# Patient Record
Sex: MALE | Race: OTHER | NOT HISPANIC OR LATINO
[De-identification: names, ages, dates, MRNs, and addresses within clinical notes are randomized per-mention and may not be internally consistent; named-entity substitution may affect disease eponyms.]

---

## 2018-02-08 ENCOUNTER — RESULT REVIEW (OUTPATIENT)
Age: 68
End: 2018-02-08

## 2018-04-13 ENCOUNTER — APPOINTMENT (OUTPATIENT)
Dept: UROLOGY | Facility: CLINIC | Age: 68
End: 2018-04-13
Payer: MEDICARE

## 2018-04-13 VITALS
TEMPERATURE: 97.8 F | SYSTOLIC BLOOD PRESSURE: 143 MMHG | OXYGEN SATURATION: 98 % | WEIGHT: 240 LBS | HEART RATE: 65 BPM | DIASTOLIC BLOOD PRESSURE: 78 MMHG | BODY MASS INDEX: 33.6 KG/M2 | HEIGHT: 71 IN

## 2018-04-13 DIAGNOSIS — Z80.0 FAMILY HISTORY OF MALIGNANT NEOPLASM OF DIGESTIVE ORGANS: ICD-10-CM

## 2018-04-13 DIAGNOSIS — Z86.39 PERSONAL HISTORY OF OTHER ENDOCRINE, NUTRITIONAL AND METABOLIC DISEASE: ICD-10-CM

## 2018-04-13 DIAGNOSIS — R31.29 OTHER MICROSCOPIC HEMATURIA: ICD-10-CM

## 2018-04-13 DIAGNOSIS — Z87.891 PERSONAL HISTORY OF NICOTINE DEPENDENCE: ICD-10-CM

## 2018-04-13 DIAGNOSIS — N28.1 CYST OF KIDNEY, ACQUIRED: ICD-10-CM

## 2018-04-13 DIAGNOSIS — N20.0 CALCULUS OF KIDNEY: ICD-10-CM

## 2018-04-13 PROCEDURE — 51798 US URINE CAPACITY MEASURE: CPT | Mod: 59

## 2018-04-13 PROCEDURE — 51741 ELECTRO-UROFLOWMETRY FIRST: CPT

## 2018-04-13 PROCEDURE — 99204 OFFICE O/P NEW MOD 45 MIN: CPT

## 2018-04-15 ENCOUNTER — EMERGENCY (EMERGENCY)
Facility: HOSPITAL | Age: 68
LOS: 1 days | Discharge: HOME | End: 2018-04-15
Attending: EMERGENCY MEDICINE | Admitting: EMERGENCY MEDICINE
Payer: COMMERCIAL

## 2018-04-15 VITALS
TEMPERATURE: 99 F | DIASTOLIC BLOOD PRESSURE: 77 MMHG | HEART RATE: 73 BPM | OXYGEN SATURATION: 97 % | SYSTOLIC BLOOD PRESSURE: 157 MMHG | RESPIRATION RATE: 16 BRPM

## 2018-04-15 VITALS
WEIGHT: 240.08 LBS | RESPIRATION RATE: 18 BRPM | TEMPERATURE: 98 F | OXYGEN SATURATION: 96 % | SYSTOLIC BLOOD PRESSURE: 174 MMHG | DIASTOLIC BLOOD PRESSURE: 78 MMHG | HEART RATE: 64 BPM

## 2018-04-15 LAB
ALBUMIN SERPL ELPH-MCNC: 4.2 G/DL — SIGNIFICANT CHANGE UP (ref 3.3–5)
ALP SERPL-CCNC: 51 U/L — SIGNIFICANT CHANGE UP (ref 40–120)
ALT FLD-CCNC: 30 U/L RC — SIGNIFICANT CHANGE UP (ref 10–45)
ANION GAP SERPL CALC-SCNC: 15 MMOL/L — SIGNIFICANT CHANGE UP (ref 5–17)
APPEARANCE UR: CLEAR — SIGNIFICANT CHANGE UP
AST SERPL-CCNC: 23 U/L — SIGNIFICANT CHANGE UP (ref 10–40)
BASOPHILS # BLD AUTO: 0 K/UL — SIGNIFICANT CHANGE UP (ref 0–0.2)
BASOPHILS NFR BLD AUTO: 0 % — SIGNIFICANT CHANGE UP (ref 0–2)
BILIRUB SERPL-MCNC: 0.3 MG/DL — SIGNIFICANT CHANGE UP (ref 0.2–1.2)
BILIRUB UR-MCNC: NEGATIVE — SIGNIFICANT CHANGE UP
BUN SERPL-MCNC: 28 MG/DL — HIGH (ref 7–23)
CALCIUM SERPL-MCNC: 10 MG/DL — SIGNIFICANT CHANGE UP (ref 8.4–10.5)
CHLORIDE SERPL-SCNC: 99 MMOL/L — SIGNIFICANT CHANGE UP (ref 96–108)
CO2 SERPL-SCNC: 23 MMOL/L — SIGNIFICANT CHANGE UP (ref 22–31)
COLOR SPEC: YELLOW — SIGNIFICANT CHANGE UP
CREAT SERPL-MCNC: 2.4 MG/DL — HIGH (ref 0.5–1.3)
DIFF PNL FLD: ABNORMAL
EOSINOPHIL # BLD AUTO: 0 K/UL — SIGNIFICANT CHANGE UP (ref 0–0.5)
EOSINOPHIL NFR BLD AUTO: 0.1 % — SIGNIFICANT CHANGE UP (ref 0–6)
GLUCOSE SERPL-MCNC: 430 MG/DL — HIGH (ref 70–99)
GLUCOSE UR QL: >1000 MG/DL
HCT VFR BLD CALC: 45.4 % — SIGNIFICANT CHANGE UP (ref 39–50)
HGB BLD-MCNC: 15.3 G/DL — SIGNIFICANT CHANGE UP (ref 13–17)
KETONES UR-MCNC: NEGATIVE — SIGNIFICANT CHANGE UP
LEUKOCYTE ESTERASE UR-ACNC: NEGATIVE — SIGNIFICANT CHANGE UP
LYMPHOCYTES # BLD AUTO: 0.8 K/UL — LOW (ref 1–3.3)
LYMPHOCYTES # BLD AUTO: 9.2 % — LOW (ref 13–44)
MCHC RBC-ENTMCNC: 31 PG — SIGNIFICANT CHANGE UP (ref 27–34)
MCHC RBC-ENTMCNC: 33.7 GM/DL — SIGNIFICANT CHANGE UP (ref 32–36)
MCV RBC AUTO: 92 FL — SIGNIFICANT CHANGE UP (ref 80–100)
MONOCYTES # BLD AUTO: 0.3 K/UL — SIGNIFICANT CHANGE UP (ref 0–0.9)
MONOCYTES NFR BLD AUTO: 2.9 % — SIGNIFICANT CHANGE UP (ref 2–14)
NEUTROPHILS # BLD AUTO: 7.8 K/UL — HIGH (ref 1.8–7.4)
NEUTROPHILS NFR BLD AUTO: 87.8 % — HIGH (ref 43–77)
NITRITE UR-MCNC: NEGATIVE — SIGNIFICANT CHANGE UP
PH UR: 5.5 — SIGNIFICANT CHANGE UP (ref 5–8)
PLATELET # BLD AUTO: 212 K/UL — SIGNIFICANT CHANGE UP (ref 150–400)
POTASSIUM SERPL-MCNC: 5.1 MMOL/L — SIGNIFICANT CHANGE UP (ref 3.5–5.3)
POTASSIUM SERPL-SCNC: 5.1 MMOL/L — SIGNIFICANT CHANGE UP (ref 3.5–5.3)
PROT SERPL-MCNC: 7.1 G/DL — SIGNIFICANT CHANGE UP (ref 6–8.3)
PROT UR-MCNC: SIGNIFICANT CHANGE UP
RBC # BLD: 4.94 M/UL — SIGNIFICANT CHANGE UP (ref 4.2–5.8)
RBC # FLD: 13.1 % — SIGNIFICANT CHANGE UP (ref 10.3–14.5)
SODIUM SERPL-SCNC: 137 MMOL/L — SIGNIFICANT CHANGE UP (ref 135–145)
SP GR SPEC: 1.03 — HIGH (ref 1.01–1.02)
UROBILINOGEN FLD QL: NEGATIVE — SIGNIFICANT CHANGE UP
WBC # BLD: 8.9 K/UL — SIGNIFICANT CHANGE UP (ref 3.8–10.5)
WBC # FLD AUTO: 8.9 K/UL — SIGNIFICANT CHANGE UP (ref 3.8–10.5)

## 2018-04-15 PROCEDURE — 99284 EMERGENCY DEPT VISIT MOD MDM: CPT | Mod: 25

## 2018-04-15 PROCEDURE — 74176 CT ABD & PELVIS W/O CONTRAST: CPT

## 2018-04-15 PROCEDURE — 74176 CT ABD & PELVIS W/O CONTRAST: CPT | Mod: 26

## 2018-04-15 PROCEDURE — 96374 THER/PROPH/DIAG INJ IV PUSH: CPT

## 2018-04-15 PROCEDURE — 85027 COMPLETE CBC AUTOMATED: CPT

## 2018-04-15 PROCEDURE — 81001 URINALYSIS AUTO W/SCOPE: CPT

## 2018-04-15 PROCEDURE — 82962 GLUCOSE BLOOD TEST: CPT

## 2018-04-15 PROCEDURE — 80053 COMPREHEN METABOLIC PANEL: CPT

## 2018-04-15 PROCEDURE — 96372 THER/PROPH/DIAG INJ SC/IM: CPT | Mod: XU

## 2018-04-15 RX ORDER — INSULIN LISPRO 100/ML
8 VIAL (ML) SUBCUTANEOUS ONCE
Qty: 0 | Refills: 0 | Status: COMPLETED | OUTPATIENT
Start: 2018-04-15 | End: 2018-04-15

## 2018-04-15 RX ORDER — SODIUM CHLORIDE 9 MG/ML
1000 INJECTION, SOLUTION INTRAVENOUS ONCE
Qty: 0 | Refills: 0 | Status: COMPLETED | OUTPATIENT
Start: 2018-04-15 | End: 2018-04-15

## 2018-04-15 RX ORDER — KETOROLAC TROMETHAMINE 30 MG/ML
15 SYRINGE (ML) INJECTION ONCE
Qty: 0 | Refills: 0 | Status: DISCONTINUED | OUTPATIENT
Start: 2018-04-15 | End: 2018-04-15

## 2018-04-15 RX ORDER — INSULIN LISPRO 100/ML
5 VIAL (ML) SUBCUTANEOUS ONCE
Qty: 0 | Refills: 0 | Status: DISCONTINUED | OUTPATIENT
Start: 2018-04-15 | End: 2018-04-15

## 2018-04-15 RX ADMIN — Medication 8 UNIT(S): at 11:08

## 2018-04-15 RX ADMIN — Medication 15 MILLIGRAM(S): at 08:40

## 2018-04-15 RX ADMIN — SODIUM CHLORIDE 1000 MILLILITER(S): 9 INJECTION, SOLUTION INTRAVENOUS at 08:21

## 2018-04-15 RX ADMIN — Medication 15 MILLIGRAM(S): at 08:13

## 2018-04-15 NOTE — ED ADULT NURSE NOTE - OBJECTIVE STATEMENT
68 y/o male c/o left flank pain started since last night.  Pt states that the pain is on his left side,  and he's unable find a comfortable position.  Pt also c/o some nausea. Pt denies fever, vomiting,  hematuria, dysuria, weakness, numbness, tingling, chest pain, SOB.  No acute respiratory distress noted.   Abd soft, distented, large hernia.

## 2018-04-15 NOTE — ED PROVIDER NOTE - PROGRESS NOTE DETAILS
Dr. Chavis Note: pt stable, blood usgar improved, spoke to radiology attending about ct report, 1mm stone with mild hydronephrosis, technical issues with getting report, stable for dc home with urologist f/u otherwise.

## 2018-04-15 NOTE — ED PROVIDER NOTE - MEDICAL DECISION MAKING DETAILS
67 Y M with L flank pain and recent US with stone. DDx: Nephrolithiasis, pyelonephritis, strain/sprain. Will get CT abdomen since this is first stone and distant hx of smoking to look for any vascular abnormalities as well. Will give Toradol now, basic labs and UA, if no signs of infection and able to control pain will DC with urology follow up.

## 2018-04-16 LAB
APPEARANCE: CLEAR
BACTERIA UR CULT: NORMAL
BACTERIA: NEGATIVE
BILIRUBIN URINE: NEGATIVE
BLOOD URINE: ABNORMAL
COLOR: YELLOW
GLUCOSE QUALITATIVE U: NEGATIVE MG/DL
KETONES URINE: NEGATIVE
LEUKOCYTE ESTERASE URINE: NEGATIVE
MICROSCOPIC-UA: NORMAL
NITRITE URINE: NEGATIVE
PH URINE: 5
PROTEIN URINE: 30 MG/DL
RED BLOOD CELLS URINE: 3 /HPF
SPECIFIC GRAVITY URINE: 1.02
SQUAMOUS EPITHELIAL CELLS: 1 /HPF
UROBILINOGEN URINE: NEGATIVE MG/DL
WHITE BLOOD CELLS URINE: 1 /HPF

## 2018-04-18 LAB — CORE LAB FLUID CYTOLOGY: NORMAL

## 2018-08-10 ENCOUNTER — APPOINTMENT (OUTPATIENT)
Dept: UROLOGY | Facility: CLINIC | Age: 68
End: 2018-08-10

## 2019-05-20 PROBLEM — E11.9 TYPE 2 DIABETES MELLITUS WITHOUT COMPLICATIONS: Chronic | Status: ACTIVE | Noted: 2018-04-15

## 2019-05-22 ENCOUNTER — APPOINTMENT (OUTPATIENT)
Dept: ORTHOPEDIC SURGERY | Facility: CLINIC | Age: 69
End: 2019-05-22
Payer: MEDICARE

## 2019-05-22 PROCEDURE — 99204 OFFICE O/P NEW MOD 45 MIN: CPT

## 2019-05-22 PROCEDURE — 73564 X-RAY EXAM KNEE 4 OR MORE: CPT | Mod: RT

## 2019-05-22 NOTE — PHYSICAL EXAM
[de-identified] : Patient is well nourished, well-developed, in no acute distress, with appropriate mood and affect. The patient is oriented to time, place, and person. Respirations are even and unlabored. Gait evaluation does not reveal a limp. There is no inguinal adenopathy. Examination of the contralateral knee shows normal range of motion, strength, no tenderness, and intact skin. The affected limb is well-perfused and showed 2+ dp/pt pulses, without skin lesions, shows a grossly normal motor and sensory examination. Knee motion is painless and the knee moves from 0-135 degrees. The knee is stable within that range-of-motion to AP and ML stress with a 1A Lachman, negative anterior or posterior drawer and no instability to varus or valgus stress. The alignment of the knee is 5 degrees of varus. No effusion or crepitus is noted.  Tender to palpation in the medial joint line.  No tenderness to palpation about the lateral joint line, medial or lateral tibial plateau, medial or lateral femoral condyle, medial or lateral patellar facets, superior or inferior pole of the patella. Gerber's is positive medially. Muscle strength is normal. Pedal pulses are palpable. Hip examination was negative. [de-identified] : Long standing knee, AP knee, lateral knee, and patellar views of the right knee were ordered and taken in the office and demonstrate no evidence of degenerative joint disease of the knee, fractures, intra-articular pathology.

## 2019-05-22 NOTE — HISTORY OF PRESENT ILLNESS
[de-identified] : This is very nice 68-year-old gentleman experiencing 1 month of right knee pain, which is moderate in intensity. The pain mildly limits activities of daily living. Walking tolerance is not reduced.  He says he has some intermittent clicking in the knee but no catching or locking or giving way.  He takes NSAIDs over-the-counter for this which helps mildly.  The knee brace does help.  Rest does help.  The patient denies any radiation of the pain to the feet and it is not associated with numbness, tingling, or weakness.  He does not need a cane or walker for ambulation.  He has not tried formal physical therapy.

## 2019-05-22 NOTE — DISCUSSION/SUMMARY
[de-identified] : This patient has right medial knee pain with some positive meniscal signs for injury.  An extensive discussion was conducted on the natural history of the disease and the variety of surgical and non-surgical options available to the patient including, but not limited to non-steroidal anti-inflammatory medications, steroid injections, physical therapy, maintenance of ideal body weight, and reduction of activity.  I recommended a home exercise program and use of a neoprene sleeve knee brace.  He can continue to take over-the-counter NSAIDs as needed for pain.  The patient will schedule an appointment as needed.

## 2019-07-09 ENCOUNTER — APPOINTMENT (OUTPATIENT)
Dept: ORTHOPEDIC SURGERY | Facility: CLINIC | Age: 69
End: 2019-07-09
Payer: MEDICARE

## 2019-07-09 VITALS — BODY MASS INDEX: 31.52 KG/M2 | WEIGHT: 226 LBS

## 2019-07-09 DIAGNOSIS — M25.561 PAIN IN RIGHT KNEE: ICD-10-CM

## 2019-07-09 PROCEDURE — 99213 OFFICE O/P EST LOW 20 MIN: CPT

## 2019-07-09 NOTE — PHYSICAL EXAM
[de-identified] : Patient is well nourished, well-developed, in no acute distress, with appropriate mood and affect. The patient is oriented to time, place, and person. Respirations are even and unlabored. Gait evaluation does not reveal a limp. There is no inguinal adenopathy. Examination of the contralateral knee shows normal range of motion, strength, no tenderness, and intact skin. The affected limb is well-perfused and showed 2+ dp/pt pulses, without skin lesions, shows a grossly normal motor and sensory examination. Knee motion is painless and the knee moves from 0-135 degrees. The knee is stable within that range-of-motion to AP and ML stress with a 1A Lachman, negative anterior or posterior drawer and no instability to varus or valgus stress. The alignment of the knee is 5 degrees of varus. No effusion or crepitus is noted.  Tender to palpation in the medial joint line.  No tenderness to palpation about the lateral joint line, medial or lateral tibial plateau, medial or lateral femoral condyle, medial or lateral patellar facets, superior or inferior pole of the patella. Gerber's is positive medially. Muscle strength is normal. Pedal pulses are palpable. Hip examination was negative.

## 2019-07-09 NOTE — HISTORY OF PRESENT ILLNESS
[de-identified] : This is very nice 68-year-old gentleman experiencing 3 month of right knee pain, which is moderate in intensity. The pain mildly limits activities of daily living. Walking tolerance is not reduced.  He says he has some intermittent clicking in the knee but no catching or locking or giving way.  He takes NSAIDs over-the-counter for this which helps mildly.  He never tried the prescription medications.  The knee brace does help.  Rest does help.  The patient denies any radiation of the pain to the feet and it is not associated with numbness, tingling, or weakness.  He does not need a cane or walker for ambulation.  He has not tried formal physical therapy.

## 2019-07-09 NOTE — DISCUSSION/SUMMARY
[de-identified] : This patient has right medial knee pain with some positive meniscal signs for injury.  An extensive discussion was conducted on the natural history of the disease and the variety of surgical and non-surgical options available to the patient including, but not limited to non-steroidal anti-inflammatory medications, steroid injections, physical therapy, maintenance of ideal body weight, and reduction of activity.  I recommended a home exercise program and use of a neoprene sleeve knee brace.  Now he is agreeable to Mobic and so I prescribed this time..  The patient will schedule an appointment as needed.  If his pain continues after 6 weeks and he will call my office and we will obtain an MRI of the right knee.

## 2022-11-21 ENCOUNTER — NON-APPOINTMENT (OUTPATIENT)
Age: 72
End: 2022-11-21

## 2022-11-30 ENCOUNTER — INPATIENT (INPATIENT)
Facility: HOSPITAL | Age: 72
LOS: 9 days | Discharge: HOME | DRG: 280 | End: 2022-12-10
Attending: STUDENT IN AN ORGANIZED HEALTH CARE EDUCATION/TRAINING PROGRAM | Admitting: STUDENT IN AN ORGANIZED HEALTH CARE EDUCATION/TRAINING PROGRAM
Payer: COMMERCIAL

## 2022-11-30 VITALS
TEMPERATURE: 98 F | RESPIRATION RATE: 18 BRPM | OXYGEN SATURATION: 95 % | SYSTOLIC BLOOD PRESSURE: 146 MMHG | HEIGHT: 71 IN | WEIGHT: 244.93 LBS | DIASTOLIC BLOOD PRESSURE: 101 MMHG | HEART RATE: 123 BPM

## 2022-11-30 LAB
ALBUMIN SERPL ELPH-MCNC: 4.5 G/DL — SIGNIFICANT CHANGE UP (ref 3.3–5)
ALP SERPL-CCNC: 49 U/L — SIGNIFICANT CHANGE UP (ref 40–120)
ALT FLD-CCNC: 51 U/L — HIGH (ref 10–45)
ANION GAP SERPL CALC-SCNC: 11 MMOL/L — SIGNIFICANT CHANGE UP (ref 5–17)
AST SERPL-CCNC: 75 U/L — HIGH (ref 10–40)
BASE EXCESS BLDV CALC-SCNC: 1.5 MMOL/L — SIGNIFICANT CHANGE UP (ref -2–3)
BASOPHILS # BLD AUTO: 0.05 K/UL — SIGNIFICANT CHANGE UP (ref 0–0.2)
BASOPHILS NFR BLD AUTO: 0.6 % — SIGNIFICANT CHANGE UP (ref 0–2)
BILIRUB SERPL-MCNC: 0.3 MG/DL — SIGNIFICANT CHANGE UP (ref 0.2–1.2)
BUN SERPL-MCNC: 31 MG/DL — HIGH (ref 7–23)
CA-I SERPL-SCNC: 1.22 MMOL/L — SIGNIFICANT CHANGE UP (ref 1.15–1.33)
CALCIUM SERPL-MCNC: 9.7 MG/DL — SIGNIFICANT CHANGE UP (ref 8.4–10.5)
CHLORIDE BLDV-SCNC: 108 MMOL/L — SIGNIFICANT CHANGE UP (ref 96–108)
CHLORIDE SERPL-SCNC: 107 MMOL/L — SIGNIFICANT CHANGE UP (ref 96–108)
CO2 BLDV-SCNC: 29 MMOL/L — HIGH (ref 22–26)
CO2 SERPL-SCNC: 23 MMOL/L — SIGNIFICANT CHANGE UP (ref 22–31)
CREAT SERPL-MCNC: 2.01 MG/DL — HIGH (ref 0.5–1.3)
EGFR: 35 ML/MIN/1.73M2 — LOW
EOSINOPHIL # BLD AUTO: 0.08 K/UL — SIGNIFICANT CHANGE UP (ref 0–0.5)
EOSINOPHIL NFR BLD AUTO: 1 % — SIGNIFICANT CHANGE UP (ref 0–6)
FLUAV AG NPH QL: SIGNIFICANT CHANGE UP
FLUBV AG NPH QL: SIGNIFICANT CHANGE UP
GAS PNL BLDV: 138 MMOL/L — SIGNIFICANT CHANGE UP (ref 136–145)
GAS PNL BLDV: SIGNIFICANT CHANGE UP
GLUCOSE BLDV-MCNC: 180 MG/DL — HIGH (ref 70–99)
GLUCOSE SERPL-MCNC: 174 MG/DL — HIGH (ref 70–99)
HCO3 BLDV-SCNC: 28 MMOL/L — SIGNIFICANT CHANGE UP (ref 22–29)
HCT VFR BLD CALC: 41 % — SIGNIFICANT CHANGE UP (ref 39–50)
HCT VFR BLDA CALC: 39 % — SIGNIFICANT CHANGE UP (ref 39–51)
HGB BLD CALC-MCNC: 12.9 G/DL — SIGNIFICANT CHANGE UP (ref 12.6–17.4)
HGB BLD-MCNC: 12.7 G/DL — LOW (ref 13–17)
IMM GRANULOCYTES NFR BLD AUTO: 0.4 % — SIGNIFICANT CHANGE UP (ref 0–0.9)
LACTATE BLDV-MCNC: 1 MMOL/L — SIGNIFICANT CHANGE UP (ref 0.5–2)
LYMPHOCYTES # BLD AUTO: 1.95 K/UL — SIGNIFICANT CHANGE UP (ref 1–3.3)
LYMPHOCYTES # BLD AUTO: 24 % — SIGNIFICANT CHANGE UP (ref 13–44)
MCHC RBC-ENTMCNC: 29.1 PG — SIGNIFICANT CHANGE UP (ref 27–34)
MCHC RBC-ENTMCNC: 31 GM/DL — LOW (ref 32–36)
MCV RBC AUTO: 94 FL — SIGNIFICANT CHANGE UP (ref 80–100)
MONOCYTES # BLD AUTO: 0.77 K/UL — SIGNIFICANT CHANGE UP (ref 0–0.9)
MONOCYTES NFR BLD AUTO: 9.5 % — SIGNIFICANT CHANGE UP (ref 2–14)
NEUTROPHILS # BLD AUTO: 5.24 K/UL — SIGNIFICANT CHANGE UP (ref 1.8–7.4)
NEUTROPHILS NFR BLD AUTO: 64.5 % — SIGNIFICANT CHANGE UP (ref 43–77)
NRBC # BLD: 0 /100 WBCS — SIGNIFICANT CHANGE UP (ref 0–0)
PCO2 BLDV: 49 MMHG — SIGNIFICANT CHANGE UP (ref 42–55)
PH BLDV: 7.36 — SIGNIFICANT CHANGE UP (ref 7.32–7.43)
PLATELET # BLD AUTO: 271 K/UL — SIGNIFICANT CHANGE UP (ref 150–400)
PO2 BLDV: 36 MMHG — SIGNIFICANT CHANGE UP (ref 25–45)
POTASSIUM BLDV-SCNC: 4.7 MMOL/L — SIGNIFICANT CHANGE UP (ref 3.5–5.1)
POTASSIUM SERPL-MCNC: 4.5 MMOL/L — SIGNIFICANT CHANGE UP (ref 3.5–5.3)
POTASSIUM SERPL-SCNC: 4.5 MMOL/L — SIGNIFICANT CHANGE UP (ref 3.5–5.3)
PROT SERPL-MCNC: 7.5 G/DL — SIGNIFICANT CHANGE UP (ref 6–8.3)
RBC # BLD: 4.36 M/UL — SIGNIFICANT CHANGE UP (ref 4.2–5.8)
RBC # FLD: 14.2 % — SIGNIFICANT CHANGE UP (ref 10.3–14.5)
RSV RNA NPH QL NAA+NON-PROBE: SIGNIFICANT CHANGE UP
SAO2 % BLDV: 51.6 % — LOW (ref 67–88)
SARS-COV-2 RNA SPEC QL NAA+PROBE: SIGNIFICANT CHANGE UP
SODIUM SERPL-SCNC: 141 MMOL/L — SIGNIFICANT CHANGE UP (ref 135–145)
WBC # BLD: 8.12 K/UL — SIGNIFICANT CHANGE UP (ref 3.8–10.5)
WBC # FLD AUTO: 8.12 K/UL — SIGNIFICANT CHANGE UP (ref 3.8–10.5)

## 2022-11-30 PROCEDURE — 70450 CT HEAD/BRAIN W/O DYE: CPT | Mod: 26,MA

## 2022-11-30 PROCEDURE — 73564 X-RAY EXAM KNEE 4 OR MORE: CPT | Mod: 26,RT

## 2022-11-30 PROCEDURE — 99285 EMERGENCY DEPT VISIT HI MDM: CPT

## 2022-11-30 PROCEDURE — 71046 X-RAY EXAM CHEST 2 VIEWS: CPT | Mod: 26

## 2022-11-30 PROCEDURE — 93010 ELECTROCARDIOGRAM REPORT: CPT

## 2022-11-30 RX ORDER — SODIUM CHLORIDE 9 MG/ML
500 INJECTION INTRAMUSCULAR; INTRAVENOUS; SUBCUTANEOUS ONCE
Refills: 0 | Status: COMPLETED | OUTPATIENT
Start: 2022-11-30 | End: 2022-11-30

## 2022-11-30 RX ORDER — HALOPERIDOL DECANOATE 100 MG/ML
5 INJECTION INTRAMUSCULAR ONCE
Refills: 0 | Status: DISCONTINUED | OUTPATIENT
Start: 2022-11-30 | End: 2022-11-30

## 2022-11-30 NOTE — ED PROVIDER NOTE - OBJECTIVE STATEMENT
71yo M T2DM, HTN/HLD presents emergency department for seizure-like behavior over the past 2 to 3 days.  Patient accompanied by wife.  Reports while [place] 2 to 3 days ago had episode of kicking of his legs witnessed by son while patient was sleeping.  Patient woke up when EMS stimulated him at that time found hypoglycemic to the 40s and given dextrose.  No clear postictal period.  Had another episode of erratic behavior 2 to 3 weeks ago where he was running around the house screaming obscenities at that time EMS found patient hypoglycemic which resolved with p.o. sugar.  Over the past month patient has had worsening bilateral lower extremity edema as well as increased exercise fatigue and shortness of breath with orthopnea. 73yo M T2DM, HTN/HLD presents emergency department for seizure-like behavior over the past 2 to 3 days.  Patient accompanied by wife.  Reports while [place] 2 to 3 days ago had episode of kicking of his legs witnessed by son while patient was sleeping.  Patient woke up when EMS stimulated him at that time found hypoglycemic to the 40s and given dextrose.  No clear postictal period.  Had another episode of erratic behavior 2 to 3 weeks ago where he was running around the house screaming obscenities at that time EMS found patient hypoglycemic which resolved with p.o. sugar.  Over the past month patient has had worsening bilateral lower extremity edema as well as increased exercise fatigue and shortness of breath with orthopnea.    Home Meds: metformin 500mg BID, Toprol xl 100mg qd, Amlodipine-benazipril 10/20mg qd, ASA 81mg qd, lipitor 80mg qd

## 2022-11-30 NOTE — ED ADULT TRIAGE NOTE - CHIEF COMPLAINT QUOTE
seizure while sleeping last night as per son while in [place] was seen by EMS there and flew in today   c/o pain to neck & R knee

## 2022-11-30 NOTE — ED PROVIDER NOTE - RAPID ASSESSMENT
72y M w/ PMHx of IDDM, HTN, HLD presents to the ED c/o seizure while sleeping x2-3days and Orthopnea witnessed by son. Pt states last night in [place], pt fell off bed, hit R knee. Pt did not have previous seizures prior to onset of symptoms. Pt unsure of how long seizure was. Pt endorses syncopal event earlier this month on 11/13 which pt received medical care for. Pt went to PCP Dr. Jim for follow up w/ CXR and EKG which was normal 2 wks ago. Pt is scheduled for an echo in a month. Pt is well appearing in triage. Pt is awake, alert and in no distress.    Maricruz THAYER (Scribe) have documented this rapid assessment note under the dictation of Vijay Mcgraw) which has been reviewed and affirmed to be accurate. Patient was seen as a QDOC patient. 72y M w/ PMHx of IDDM, HTN, HLD presents to the ED c/o seizure while sleeping x2-3days and Orthopnea witnessed by son. Pt states last night in [place], pt fell off bed, hit R knee. Pt did not have previous seizures prior to onset of symptoms. Pt unsure of how long seizure was. Pt endorses syncopal event earlier this month on 11/13 which pt received medical care for. Pt went to PCP Dr. Jim for follow up w/ CXR and EKG which was normal 2 wks ago. Pt is scheduled for an echo in a month. Pt is well appearing in triage. Pt is awake, alert and in no distress.  Viajy Mcgraw MD, Facep     Maricruz THAYER (Scribe) have documented this rapid assessment note under the dictation of Vijay Mcgraw (MD) which has been reviewed and affirmed to be accurate. Patient was seen as a QDOC patient.    Pt seen as Q-doc/triage with scribe, Full H&P/evaulation to be performed once pt is transferred to main ED  Vijay Mcgraw MD, Face

## 2022-11-30 NOTE — ED PROVIDER NOTE - CLINICAL SUMMARY MEDICAL DECISION MAKING FREE TEXT BOX
Lori Chamorro MD - Attending Physician: Pt here with altered episodes ?associated hypoglycemia. Syncopal episode but no actual seizure episodes. Associated new orthopnea and concern for new CHF. Tachy on arrival, exam otherwise nonfocal. CT, labs, Xr for eval

## 2022-11-30 NOTE — ED ADULT NURSE NOTE - CHIEF COMPLAINT QUOTE
seizure while sleeping last night as per son while in [place] was seen by EMS there and flew in today   c/o pain to neck & R knee

## 2022-11-30 NOTE — ED ADULT NURSE NOTE - OBJECTIVE STATEMENT
Pt 73 y/o male, AxOX3, presents to ED from home complaining of multiple seizures. Pt reporting that family found pt on floor in the hotel room by son, BRYAN, kicking the sheet rock of wall. Pt does not recall event. Reporting that EMS in [facility] [place] found pt BS to be 40 at that time. Wife at bedside stating that 2 weeks ago, pt was screaming running around house yelling " I am a good man". At that time BS also found to be in the 40's. Pt does not recall that episode.  Pt is well appearing, speaking full sentences without difficulty. Breathing spontaneous and unlabored with pulse ox >95% on room air. Upon assessment, swelling noted to b/l LE +1 edema, abdomen soft and nontender, +strong peripheral pulses, moving all extremities without difficulty, lungs clear. Safety and comfort measures initiated- bed placed in lowest position and side rails raised. Pt oriented to call bell system. Pt 73 y/o male, AxOX3, presents to ED from home complaining of multiple seizures. Pt reporting that family found pt on floor in the hotel room by son, BRYAN, kicking the sheet rock of wall. Pt does not recall event, was back to baseline in 15 min. Reporting that EMS in [facility] [place] found pt blood sugar to be 40 at that time. Wife at bedside stating that 2 weeks ago, pt was screaming running around house yelling " I am a good man". At that time Blood Sugar also found to be in the 40's. Pt does not recall that episode. Pt only complaint is GUILELN w/ walking x 1 month. Denies complaints at this time. Pt is well appearing, speaking full sentences without difficulty. Breathing spontaneous and unlabored with pulse ox >95% on room air. Upon assessment, swelling noted to b/l LE +1 edema, abdomen soft and nontender, +strong peripheral pulses, moving all extremities without difficulty, lungs clear. Pt placed on continuous pulse ox and cardiac monitor, sinus tachycardia noted. Safety and comfort measures initiated- bed placed in lowest position and side rails raised. Pt oriented to call bell system.

## 2022-11-30 NOTE — ED PROVIDER NOTE - PHYSICAL EXAMINATION
GENERAL: non-toxic appearing, alert, in NAD  HEENT: atraumatic, normocephalic, Vision grossly intact, no conjunctivitis or discharge, hearing grossly intact,  no nasal discharge, epistaxis   CARDIAC: RRR, normal S1S2,  no appreciable murmurs, no cyanosis, cap refill < 2 seconds  PULM: normal work of breathing, oxygen saturation on RA wnl, CTAB, no crackles, rales, rhonchi, or wheezing  GI: abdomen nondistended, soft, nontender, no guarding or rebound tenderness, no palpable masses  NEURO: awake and alert, follows commands, normal speech, PERRLA, EOMI, no focal motor or sensory deficits, CN 2-12 intact  MSK: spine appears normal, no joint swelling or erythema, ranging all extremities with no appreciable loss of ROM  EXT: 2+ b/l LE edema,  no calf tenderness, redness or swelling  SKIN: warm, dry, and intact, no rashes  PSYCH: appropriate mood and affect

## 2022-11-30 NOTE — ED PROVIDER NOTE - PROGRESS NOTE DETAILS
aDryl Meyer, PGY-3: Pt reexamined at bedside, resting comfortably, vitals stable. Trop BNP and Cr elevated. CXR clear, likely intravascularly depleted as is sinus tach. Will give 500cc, likely new onset hf will admit for echo and further work up. Pending CT head read.

## 2022-12-01 DIAGNOSIS — Z29.9 ENCOUNTER FOR PROPHYLACTIC MEASURES, UNSPECIFIED: ICD-10-CM

## 2022-12-01 DIAGNOSIS — I10 ESSENTIAL (PRIMARY) HYPERTENSION: ICD-10-CM

## 2022-12-01 DIAGNOSIS — R06.02 SHORTNESS OF BREATH: ICD-10-CM

## 2022-12-01 DIAGNOSIS — E78.5 HYPERLIPIDEMIA, UNSPECIFIED: ICD-10-CM

## 2022-12-01 DIAGNOSIS — R56.9 UNSPECIFIED CONVULSIONS: ICD-10-CM

## 2022-12-01 DIAGNOSIS — R74.01 ELEVATION OF LEVELS OF LIVER TRANSAMINASE LEVELS: ICD-10-CM

## 2022-12-01 DIAGNOSIS — N17.9 ACUTE KIDNEY FAILURE, UNSPECIFIED: ICD-10-CM

## 2022-12-01 DIAGNOSIS — R77.8 OTHER SPECIFIED ABNORMALITIES OF PLASMA PROTEINS: ICD-10-CM

## 2022-12-01 LAB
ALBUMIN SERPL ELPH-MCNC: 3.5 G/DL — SIGNIFICANT CHANGE UP (ref 3.3–5)
ALP SERPL-CCNC: 39 U/L — LOW (ref 40–120)
ALT FLD-CCNC: 41 U/L — SIGNIFICANT CHANGE UP (ref 10–45)
ANION GAP SERPL CALC-SCNC: 9 MMOL/L — SIGNIFICANT CHANGE UP (ref 5–17)
APPEARANCE UR: CLEAR — SIGNIFICANT CHANGE UP
AST SERPL-CCNC: 53 U/L — HIGH (ref 10–40)
BACTERIA # UR AUTO: NEGATIVE — SIGNIFICANT CHANGE UP
BILIRUB SERPL-MCNC: 0.4 MG/DL — SIGNIFICANT CHANGE UP (ref 0.2–1.2)
BILIRUB UR-MCNC: NEGATIVE — SIGNIFICANT CHANGE UP
BUN SERPL-MCNC: 25 MG/DL — HIGH (ref 7–23)
CALCIUM SERPL-MCNC: 8.7 MG/DL — SIGNIFICANT CHANGE UP (ref 8.4–10.5)
CHLORIDE SERPL-SCNC: 107 MMOL/L — SIGNIFICANT CHANGE UP (ref 96–108)
CK MB BLD-MCNC: 0.2 % — SIGNIFICANT CHANGE UP (ref 0–3.5)
CK MB CFR SERPL CALC: 7.7 NG/ML — HIGH (ref 0–6.7)
CK SERPL-CCNC: 3256 U/L — HIGH (ref 30–200)
CO2 SERPL-SCNC: 23 MMOL/L — SIGNIFICANT CHANGE UP (ref 22–31)
COLOR SPEC: SIGNIFICANT CHANGE UP
CREAT SERPL-MCNC: 1.69 MG/DL — HIGH (ref 0.5–1.3)
DIFF PNL FLD: ABNORMAL
EGFR: 43 ML/MIN/1.73M2 — LOW
EPI CELLS # UR: 0 /HPF — SIGNIFICANT CHANGE UP
GLUCOSE BLDC GLUCOMTR-MCNC: 161 MG/DL — HIGH (ref 70–99)
GLUCOSE BLDC GLUCOMTR-MCNC: 179 MG/DL — HIGH (ref 70–99)
GLUCOSE BLDC GLUCOMTR-MCNC: 205 MG/DL — HIGH (ref 70–99)
GLUCOSE BLDC GLUCOMTR-MCNC: 228 MG/DL — HIGH (ref 70–99)
GLUCOSE SERPL-MCNC: 171 MG/DL — HIGH (ref 70–99)
GLUCOSE UR QL: NEGATIVE — SIGNIFICANT CHANGE UP
HCT VFR BLD CALC: 35.5 % — LOW (ref 39–50)
HGB BLD-MCNC: 11.1 G/DL — LOW (ref 13–17)
HYALINE CASTS # UR AUTO: 1 /LPF — SIGNIFICANT CHANGE UP (ref 0–2)
KETONES UR-MCNC: NEGATIVE — SIGNIFICANT CHANGE UP
LEUKOCYTE ESTERASE UR-ACNC: NEGATIVE — SIGNIFICANT CHANGE UP
MAGNESIUM SERPL-MCNC: 2 MG/DL — SIGNIFICANT CHANGE UP (ref 1.6–2.6)
MCHC RBC-ENTMCNC: 29.1 PG — SIGNIFICANT CHANGE UP (ref 27–34)
MCHC RBC-ENTMCNC: 31.3 GM/DL — LOW (ref 32–36)
MCV RBC AUTO: 93.2 FL — SIGNIFICANT CHANGE UP (ref 80–100)
NITRITE UR-MCNC: NEGATIVE — SIGNIFICANT CHANGE UP
NRBC # BLD: 0 /100 WBCS — SIGNIFICANT CHANGE UP (ref 0–0)
PH UR: 6 — SIGNIFICANT CHANGE UP (ref 5–8)
PHOSPHATE SERPL-MCNC: 3.3 MG/DL — SIGNIFICANT CHANGE UP (ref 2.5–4.5)
PLATELET # BLD AUTO: 232 K/UL — SIGNIFICANT CHANGE UP (ref 150–400)
POTASSIUM SERPL-MCNC: 4.6 MMOL/L — SIGNIFICANT CHANGE UP (ref 3.5–5.3)
POTASSIUM SERPL-SCNC: 4.6 MMOL/L — SIGNIFICANT CHANGE UP (ref 3.5–5.3)
PROT SERPL-MCNC: 6 G/DL — SIGNIFICANT CHANGE UP (ref 6–8.3)
PROT UR-MCNC: 100 — SIGNIFICANT CHANGE UP
RBC # BLD: 3.81 M/UL — LOW (ref 4.2–5.8)
RBC # FLD: 14.1 % — SIGNIFICANT CHANGE UP (ref 10.3–14.5)
RBC CASTS # UR COMP ASSIST: 71 /HPF — HIGH (ref 0–4)
SODIUM SERPL-SCNC: 139 MMOL/L — SIGNIFICANT CHANGE UP (ref 135–145)
SP GR SPEC: 1.02 — SIGNIFICANT CHANGE UP (ref 1.01–1.02)
TROPONIN T, HIGH SENSITIVITY RESULT: 91 NG/L — HIGH (ref 0–51)
UROBILINOGEN FLD QL: NEGATIVE — SIGNIFICANT CHANGE UP
WBC # BLD: 5.16 K/UL — SIGNIFICANT CHANGE UP (ref 3.8–10.5)
WBC # FLD AUTO: 5.16 K/UL — SIGNIFICANT CHANGE UP (ref 3.8–10.5)
WBC UR QL: 1 /HPF — SIGNIFICANT CHANGE UP (ref 0–5)

## 2022-12-01 PROCEDURE — 93306 TTE W/DOPPLER COMPLETE: CPT | Mod: 26

## 2022-12-01 PROCEDURE — 99223 1ST HOSP IP/OBS HIGH 75: CPT

## 2022-12-01 RX ORDER — DEXTROSE 50 % IN WATER 50 %
12.5 SYRINGE (ML) INTRAVENOUS ONCE
Refills: 0 | Status: DISCONTINUED | OUTPATIENT
Start: 2022-12-01 | End: 2022-12-10

## 2022-12-01 RX ORDER — INSULIN LISPRO 100/ML
VIAL (ML) SUBCUTANEOUS
Refills: 0 | Status: DISCONTINUED | OUTPATIENT
Start: 2022-12-01 | End: 2022-12-10

## 2022-12-01 RX ORDER — ASPIRIN/CALCIUM CARB/MAGNESIUM 324 MG
81 TABLET ORAL DAILY
Refills: 0 | Status: DISCONTINUED | OUTPATIENT
Start: 2022-12-01 | End: 2022-12-10

## 2022-12-01 RX ORDER — AMLODIPINE BESYLATE 2.5 MG/1
10 TABLET ORAL DAILY
Refills: 0 | Status: DISCONTINUED | OUTPATIENT
Start: 2022-12-02 | End: 2022-12-04

## 2022-12-01 RX ORDER — DEXTROSE 50 % IN WATER 50 %
25 SYRINGE (ML) INTRAVENOUS ONCE
Refills: 0 | Status: DISCONTINUED | OUTPATIENT
Start: 2022-12-01 | End: 2022-12-10

## 2022-12-01 RX ORDER — AMLODIPINE BESYLATE 2.5 MG/1
10 TABLET ORAL DAILY
Refills: 0 | Status: DISCONTINUED | OUTPATIENT
Start: 2022-12-01 | End: 2022-12-01

## 2022-12-01 RX ORDER — METFORMIN HYDROCHLORIDE 850 MG/1
500 TABLET ORAL
Qty: 0 | Refills: 0 | DISCHARGE

## 2022-12-01 RX ORDER — INSULIN NPH HUM/REG INSULIN HM 70-30/ML
54 VIAL (ML) SUBCUTANEOUS
Qty: 0 | Refills: 0 | DISCHARGE

## 2022-12-01 RX ORDER — METOPROLOL TARTRATE 50 MG
100 TABLET ORAL ONCE
Refills: 0 | Status: COMPLETED | OUTPATIENT
Start: 2022-12-01 | End: 2022-12-01

## 2022-12-01 RX ORDER — INSULIN GLARGINE 100 [IU]/ML
70 INJECTION, SOLUTION SUBCUTANEOUS AT BEDTIME
Refills: 0 | Status: DISCONTINUED | OUTPATIENT
Start: 2022-12-01 | End: 2022-12-01

## 2022-12-01 RX ORDER — INSULIN LISPRO 100/ML
23 VIAL (ML) SUBCUTANEOUS
Refills: 0 | Status: DISCONTINUED | OUTPATIENT
Start: 2022-12-01 | End: 2022-12-01

## 2022-12-01 RX ORDER — INSULIN GLARGINE 100 [IU]/ML
17 INJECTION, SOLUTION SUBCUTANEOUS AT BEDTIME
Refills: 0 | Status: DISCONTINUED | OUTPATIENT
Start: 2022-12-01 | End: 2022-12-04

## 2022-12-01 RX ORDER — SODIUM CHLORIDE 9 MG/ML
1000 INJECTION, SOLUTION INTRAVENOUS
Refills: 0 | Status: DISCONTINUED | OUTPATIENT
Start: 2022-12-01 | End: 2022-12-10

## 2022-12-01 RX ORDER — GLUCAGON INJECTION, SOLUTION 0.5 MG/.1ML
1 INJECTION, SOLUTION SUBCUTANEOUS ONCE
Refills: 0 | Status: DISCONTINUED | OUTPATIENT
Start: 2022-12-01 | End: 2022-12-10

## 2022-12-01 RX ORDER — AMLODIPINE BESYLATE 2.5 MG/1
1 TABLET ORAL
Qty: 0 | Refills: 0 | DISCHARGE

## 2022-12-01 RX ORDER — MORPHINE SULFATE 50 MG/1
2 CAPSULE, EXTENDED RELEASE ORAL ONCE
Refills: 0 | Status: DISCONTINUED | OUTPATIENT
Start: 2022-12-01 | End: 2022-12-01

## 2022-12-01 RX ORDER — INSULIN DETEMIR 100/ML (3)
80 INSULIN PEN (ML) SUBCUTANEOUS
Qty: 0 | Refills: 0 | DISCHARGE

## 2022-12-01 RX ORDER — ATORVASTATIN CALCIUM 80 MG/1
80 TABLET, FILM COATED ORAL ONCE
Refills: 0 | Status: COMPLETED | OUTPATIENT
Start: 2022-12-01 | End: 2022-12-01

## 2022-12-01 RX ORDER — INSULIN LISPRO 100/ML
6 VIAL (ML) SUBCUTANEOUS
Refills: 0 | Status: DISCONTINUED | OUTPATIENT
Start: 2022-12-01 | End: 2022-12-04

## 2022-12-01 RX ORDER — HEPARIN SODIUM 5000 [USP'U]/ML
5000 INJECTION INTRAVENOUS; SUBCUTANEOUS EVERY 8 HOURS
Refills: 0 | Status: DISCONTINUED | OUTPATIENT
Start: 2022-12-01 | End: 2022-12-02

## 2022-12-01 RX ORDER — DEXTROSE 50 % IN WATER 50 %
15 SYRINGE (ML) INTRAVENOUS ONCE
Refills: 0 | Status: DISCONTINUED | OUTPATIENT
Start: 2022-12-01 | End: 2022-12-10

## 2022-12-01 RX ORDER — ATORVASTATIN CALCIUM 80 MG/1
80 TABLET, FILM COATED ORAL AT BEDTIME
Refills: 0 | Status: DISCONTINUED | OUTPATIENT
Start: 2022-12-01 | End: 2022-12-02

## 2022-12-01 RX ORDER — SODIUM CHLORIDE 9 MG/ML
1000 INJECTION, SOLUTION INTRAVENOUS
Refills: 0 | Status: DISCONTINUED | OUTPATIENT
Start: 2022-12-01 | End: 2022-12-03

## 2022-12-01 RX ORDER — AMLODIPINE BESYLATE 2.5 MG/1
10 TABLET ORAL ONCE
Refills: 0 | Status: COMPLETED | OUTPATIENT
Start: 2022-12-01 | End: 2022-12-01

## 2022-12-01 RX ORDER — METFORMIN HYDROCHLORIDE 850 MG/1
500 TABLET ORAL ONCE
Refills: 0 | Status: COMPLETED | OUTPATIENT
Start: 2022-12-01 | End: 2022-12-01

## 2022-12-01 RX ADMIN — AMLODIPINE BESYLATE 10 MILLIGRAM(S): 2.5 TABLET ORAL at 12:51

## 2022-12-01 RX ADMIN — INSULIN GLARGINE 17 UNIT(S): 100 INJECTION, SOLUTION SUBCUTANEOUS at 23:17

## 2022-12-01 RX ADMIN — ATORVASTATIN CALCIUM 80 MILLIGRAM(S): 80 TABLET, FILM COATED ORAL at 22:30

## 2022-12-01 RX ADMIN — ATORVASTATIN CALCIUM 80 MILLIGRAM(S): 80 TABLET, FILM COATED ORAL at 04:50

## 2022-12-01 RX ADMIN — Medication 81 MILLIGRAM(S): at 04:49

## 2022-12-01 RX ADMIN — HEPARIN SODIUM 5000 UNIT(S): 5000 INJECTION INTRAVENOUS; SUBCUTANEOUS at 22:30

## 2022-12-01 RX ADMIN — Medication 100 MILLIGRAM(S): at 04:49

## 2022-12-01 RX ADMIN — Medication 1: at 16:23

## 2022-12-01 RX ADMIN — METFORMIN HYDROCHLORIDE 500 MILLIGRAM(S): 850 TABLET ORAL at 04:50

## 2022-12-01 RX ADMIN — AMLODIPINE BESYLATE 10 MILLIGRAM(S): 2.5 TABLET ORAL at 04:50

## 2022-12-01 RX ADMIN — SODIUM CHLORIDE 500 MILLILITER(S): 9 INJECTION INTRAMUSCULAR; INTRAVENOUS; SUBCUTANEOUS at 00:04

## 2022-12-01 RX ADMIN — SODIUM CHLORIDE 100 MILLILITER(S): 9 INJECTION, SOLUTION INTRAVENOUS at 12:51

## 2022-12-01 NOTE — MEDICAL STUDENT ADULT H&P (EDUCATION) - NS MD HP STUD PE NEURO FT
A&Ox3, CNs II-XII grossly intact, normal tone, strength 5/5 in UE and LE bilaterally, sensation to light touch intact, patellar reflexes 1+

## 2022-12-01 NOTE — H&P ADULT - PROBLEM SELECTOR PLAN 6
-c/w amlodpine for BP control   -holding ACE-I i/s/o [place]  -holding metoprolol to not blunt physiologic tachycardia  -consider switching out amlodipine for other GDMT blood pressure control (ARB/ARNI)

## 2022-12-01 NOTE — H&P ADULT - ATTENDING COMMENTS
72M with T2DM on insulin, HTN, HLD here with possible seizures 2/2 hypoglycemia likely from misunderstanding of insulin regimen, complicated by rhabdomyolysis and with worsening exercise tolerance secondary to possible acute heart failure.     # ADHF  Mildly increasing pitting edema per patient w/ worsening exercise tolerance and paroxysmal nocturnal dyspnea suggestive of heart failure, with elevated BMP  - obtain echo  - strict I&Os  - daily standing weights  - hold diuretics for now, given c/f rhabdo    # Rhabdo  Mildly elevated CK i/s/o possible seizures 2/2 hypoglycemia  - Cr improved with 500cc in ED, c/w gentle hydration, frequent monitor of respiratory status  - trend CK til normalization    # TATI  Elevated Cr i/s/o rhabdo, need to find out baseline  - Avoid nephrotoxic agents  - Trend Cr daily    # T2DM  On basal/bolus 80BID/45qac per endocrinologist office. However, pt is confused about his exact regimen.  - weight base dose for now given he may not be taking his insulin correctly and he has been hypoglycemic at home  - monitor FS qac qhs   - basal/bolus 17/6  - a1c, lipid panel    Rest of plan as above. Discussed with HS. 72M with T2DM on insulin, HTN, HLD here with possible seizures 2/2 hypoglycemia likely from misunderstanding of insulin regimen, complicated by rhabdomyolysis and with worsening exercise tolerance secondary to possible acute heart failure.     CXR personally interpreted clear lungs  CTH personally interpreted no intracranial pathology c    # ADHF  Mildly increasing pitting edema per patient w/ worsening exercise tolerance and paroxysmal nocturnal dyspnea suggestive of heart failure, with elevated BMP  - obtain echo  - strict I&Os  - daily standing weights  - hold diuretics for now, given c/f rhabdo    # Rhabdo  Mildly elevated CK i/s/o possible seizures 2/2 hypoglycemia  - Cr improved with 500cc in ED, c/w gentle hydration, frequent monitor of respiratory status  - trend CK til normalization    # TATI  Elevated Cr i/s/o rhabdo, need to find out baseline  - Avoid nephrotoxic agents  - Trend Cr daily    # T2DM  On basal/bolus 80BID/45qac per endocrinologist office. However, pt is confused about his exact regimen.  - weight base dose for now given he may not be taking his insulin correctly and he has been hypoglycemic at home  - monitor FS qac qhs   - basal/bolus 17/6  - a1c, lipid panel    Rest of plan as above. Discussed with HS.

## 2022-12-01 NOTE — MEDICAL STUDENT ADULT H&P (EDUCATION) - NS MD HP STUD PMH FT
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

## 2022-12-01 NOTE — MEDICAL STUDENT ADULT H&P (EDUCATION) - NS MD HP STUD ROS NEURO FT
+numbness/tingling in both feet, no weakness, no dizziness, no tremors, no memory impairment, no ataxia, no vertigo

## 2022-12-01 NOTE — MEDICAL STUDENT ADULT H&P (EDUCATION) - NS MD HP STUD ROS CARDIO FT
+paroxysmal nocturnal dyspnea, +orthopnea, +syncopal episode, no chest pain or pressure, no palpitations

## 2022-12-01 NOTE — MEDICAL STUDENT ADULT H&P (EDUCATION) - NS MD HP STUD SOCIAL HX FT
Live/work: Lives in a house with his wife and 2 sons. 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  Diet/Exercise: eats "not great," mostly starches, not a high salt diet. He tries to maintain an active lifestyle, exercising frequently by riding his bike and/or going to the gym but has noticed within the past couple of weeks he becomes short of breath/easily fatigued with minimal exertion (1 flight of steps, approximately 50 feet, housework, gardening). 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  EtOH: admits to having about 2 drinks on occasion  Tobacco: 1 cigar daily starting 6 months ago but stopped 1 month ago  Drugs: denies taking any illicit drugs or other prescription drugs not prescribed to him

## 2022-12-01 NOTE — MEDICAL STUDENT ADULT H&P (EDUCATION) - NS MD HP STUD RESULTS LAB FT
CKMB Units: 7.7 ng/mL   CPK Mass Assay %: 0.2 % Creatine Kinase, Serum: 3256: TEST REPEATED. U/L   Phosphorus Level, Serum: 3.3 mg/dL Magnesium, Serum: 2.0:  Comprehensive Metabolic Panel Discharge (12.01.22 @ 06:35)   Sodium, Serum: 139 mmol/L   Potassium, Serum: 4.6 mmol/L   Chloride, Serum: 107 mmol/L   Carbon Dioxide, Serum: 23 mmol/L   Anion Gap, Serum: 9 mmol/L   Blood Urea Nitrogen, Serum: 25 mg/dL   Creatinine, Serum: 1.69 mg/dL   Glucose, Serum: 171 mg/dL   Calcium, Total Serum: 8.7 mg/dL   Protein Total, Serum: 6.0 g/dL   Albumin, Serum: 3.5 g/dL   Bilirubin Total, Serum: 0.4 mg/dL   Alkaline Phosphatase, Serum: 39 U/L   Aspartate Aminotransferase (AST/SGOT): 53 U/L   Alanine Aminotransferase (ALT/SGPT): 41 U/L Troponin T, High Sensitivity Result: 91: Specimen not hemolyzed   Serum Pro-Brain Natriuretic Peptide: 3355 pg/mL (11.30.22 @ 22:14)

## 2022-12-01 NOTE — H&P ADULT - PROBLEM SELECTOR PLAN 1
Orthopnea, elevated proBNP, pulmonary edema suggestive of ADHF  >f/u TTE  -on room air  -holding diuretics i/s/o TATI   -hold home metoprolol to allow for physiologic compensatory tachycardia   -holding ACE-I i/s/o TATI --> consider switching to ARB/ARNI this admission

## 2022-12-01 NOTE — MEDICAL STUDENT ADULT H&P (EDUCATION) - NS MD HP STUD HM IMMUNIZATION FT
Received all childhood vaccines, never had chicken pox. UTD on adult vaccinations. He received the flu shot 3 weeks ago and a Covid booster shot 2 weeks ago, experiencing mild/moderate shoulder discomfort afterwards.

## 2022-12-01 NOTE — H&P ADULT - NSHPSOCIALHISTORY_GEN_ALL_CORE
Patient lives with wife at home; normally performs ADLs with ease  Eats out 4-5 times a week; high carb and high salt diet  6 months prior patient smoked 1 cigar a day daily, recently quit 1 month ago; no other smoking history

## 2022-12-01 NOTE — MEDICAL STUDENT ADULT H&P (EDUCATION) - NS MD HP STUD PE EXTREMITIES FT
1+ pedal edema bilaterally, extremities warm, no clubbing, no cyanosis; dorsalis pedis and posterior tibial pulses present bilaterally

## 2022-12-01 NOTE — H&P ADULT - NSICDXFAMILYHX_GEN_ALL_CORE_FT
FAMILY HISTORY:  Mother  Still living? Unknown  FHx: myocardial infarction, Age at diagnosis: Age Unknown    Sibling  Still living? Unknown  FHx: myocardial infarction, Age at diagnosis: Age Unknown

## 2022-12-01 NOTE — MEDICAL STUDENT ADULT H&P (EDUCATION) - NS MD HP STUD ROS ENMT FT
no hearing loss, no epistaxis, no nasal discharge, no congestion, no sore throat, no difficulty swallowing

## 2022-12-01 NOTE — H&P ADULT - PROBLEM SELECTOR PLAN 3
Per endocrinologist, patient on 80 BID levemir and 45 TID novolog with meals  Unclear compliance per patient history; concern for over medication resulting in hypoglycemic episodes  -started with weight base dosing for insulin (0.3 mg/kg/day) (17 units at bedtime, 6 units premeal)  -low dose ISS  -adjust Insulin as needed based on sliding scale requirements

## 2022-12-01 NOTE — ED ADULT NURSE REASSESSMENT NOTE - NSIMPLEMENTINTERV_GEN_ALL_ED
Implemented All Universal Safety Interventions:  [place] to call system. Call bell, personal items and telephone within reach. Instruct patient to call for assistance. Room bathroom lighting operational. Non-slip footwear when patient is off stretcher. Physically safe environment: no spills, clutter or unnecessary equipment. Stretcher in lowest position, wheels locked, appropriate side rails in place.

## 2022-12-01 NOTE — H&P ADULT - PROBLEM SELECTOR PLAN 5
Troponin peaked at 96 with elevated CKMB  -no longer needed to trend   -likely 2/2 Type II NSTEMI (demand ischemia) i/s/o ADHF

## 2022-12-01 NOTE — MEDICAL STUDENT ADULT H&P (EDUCATION) - NS MD HP STUD PE CARDIO FT
tachycardic, regular rhythm, normal S1/S2, no murmurs, gallops or rubs; PMI palpated in mid-clavicular line 5th intercostal space; no JVD

## 2022-12-01 NOTE — MEDICAL STUDENT ADULT H&P (EDUCATION) - NS MD HP STUD HX OF PRESENT ILLNESS FT
Patient is a 72 year old obese male with PMHx of insulin-dependent T2DM, HTN and HLD who presents after an episode of "seizure while asleep" 3 days ago, witnessed by his son. He and his son were on vacation in [facility] 3 days ago when his son witnessed the patient to have sudden leg kicking in the middle of the night, moving of his arms and falling out of his bed onto the floor. EMS was called and they found his blood sugar to be 40. He denies any period of confusion, weakness, headache or speech impairment afterwards. Over the past several weeks he says his son has witnessed 2 other episodes of nighttime kicking but not "as bad" as the most recent one. About 3 weeks ago he states he had an episode of "erratic" behavior while at home for which EMS was called and found he was hypoglycemic, they administered sugar and he returned to his baseline.  He sees an Endocrinologist and is taking Novolog (54 units) and Levemir (80 units) (last HgA1c approx. 10%). Notably, however, he says he recently spoke with a medical assistant who advised him on how to take the medications; thereafter he began taking Levemir before big meals and Novolog in the morning and before bed.     In addition, he says he started noticing swelling in his ankles which began this past summer. Since October he has noticed increased shortness of breath and quick fatigue during exercise, specifically while gardening, walking up 1 flight of stairs or walking a short distance, lasting 5-20 minutes and is alleviated by rest, despite previously being active (bike riding, working out in the gym). Since then he also complains of shortness of breath while laying flat at night and uses 2 pillows behind him to make himself more comfortable. He endorses occasional sudden shortness of breath that wakes him up at night and describes a sensation of "lungs can't expand" or "gasping for air." Also, about 2 weeks ago he says he collapsed while urinating which has never happened before. He denies any fever, chills, nausea, vomiting, chest pain, cough or sputum production. He was last seen by his PCP 3 weeks ago who referred him to a Cardiologist; he made an appointment for the first available in January 2023.  Patient is a 72 year old obese  male with PMHx of insulin-dependent T2DM, HTN and HLD who presents after an episode of "seizure while asleep" 3 days ago, witnessed by his son. He and his son were vacationing in [facility] 3 days prior to presentation where his son witnessed the patient to have sudden leg kicking with jerky movement of both arms and falling out of bed in the middle of the night. EMS was subsequently called and found his blood sugar to be 40. They administered sugar and the patient returned to baseline shortly thereafter. He denies any period of confusion, weakness, headache or speech impairment afterwards. Over the past several weeks he says his son has witnessed 2 other episodes of similar nighttime kicking but not "as bad" as this most recent one. About 3 weeks ago he also states he experienced an episode of "erratic" behavior during the day while at home for which EMS was called and found he was again hypoglycemic to about 40, returning to baseline shortly after being administered sugar. He says he has regular appointments with an Endocrinologist and is taking Novolog (54 units) and Levemir (80 units) (last HgA1c approximately 10%, as per the patient). Notably, however, he says he recently spoke with a medical assistant who advised him on how to take the medications; thereafter he began taking Levemir before big meals and Novolog in the morning and before bed.     The patient additionally complains that he started noticing swelling in his ankles beginning this past summer. Since October he has also noticed increased shortness of breath and quick fatigue during exercise, specifically while gardening, walking up 1 flight of stairs or walking a short distance, lasting 5-20 minutes, alleviated by rest, despite previously being active (bike riding, working out in the gym). Since then until present he says he experiences shortness of breath while laying flat at night and uses 2 pillows behind him to make himself more comfortable. He endorses occasionally experiencing sudden shortness of breath that wakes him up at night and describes a sensation of "lungs can't expand" or "gasping for air." In addition, about 2 weeks ago he says he collapsed while urinating which has never happened before. He denies any fever, chills, nausea, vomiting, chest pain, cough or sputum production. He denies any recent sick contacts. He was last seen by his PCP 3 weeks ago who referred him to a Cardiologist; he made an appointment for the first available in January 2023.

## 2022-12-01 NOTE — ED ADULT NURSE REASSESSMENT NOTE - NS ED NURSE REASSESS COMMENT FT1
Patient remains stable while in the ED, admitted, was seen by Multiple medicine team, few medication orders given as ordered. Plan of care and waiting times explained to patient and verbalized understanding. VS WDL, except the HR which remains sinus tach and medicine team aware.
Received report from SUZANNA Ring. Pt is A&Ox3, breathing spontaneously, unlabored and speaking in full sentences on RA, denies cp or sob, on CM, wife at bedside, pt safety and comfort measures provided.
Received a 72y M aaox4 ambulatory admitted to telemetry for SOB, patient presented yesterday w/ PMHx of IDDM, HTN, HLD presents to the ED c/o seizure while sleeping x2-3days and Orthopnea witnessed by son. Pt states last night in [place], pt fell off bed, hit R knee. Pt did not have previous seizures prior to onset of symptoms. Pt unsure of how long seizure was. Pt endorses syncopal event earlier this month on 11/13 which pt received medical care for. RT AC 20g noted, no pending stat orders noted. VS WDL except the HR of 128-130's, medicine team made aware of the patient HR of 128-130's.  Patient denies any cp, or sob at the moment.

## 2022-12-01 NOTE — MEDICAL STUDENT ADULT H&P (EDUCATION) - NS MD HP STUD MEDICATIONS FT
Metformin 500mg PO BID  Toprol 100mg PO once per day  Amlodipine 10mg/benazipril 20mg PO once per day  ASA 81mg PO once per day  Lipitor 80mg PO once per day  Ezetimibe 10mg PO  Novolog 54 units before meals  Levemir 80 units at night

## 2022-12-01 NOTE — MEDICAL STUDENT ADULT H&P (EDUCATION) - NS MD HP STUD ROS MUSCOLO FT
+swelling in both ankles, +heel pain, no joint pain, no joint swelling, no neck or low back pain, no myalgia, no stiffness, no decreased ROM

## 2022-12-01 NOTE — MEDICAL STUDENT ADULT H&P (EDUCATION) - NS MD HP STUD RESULTS RAD FT
Xray Chest 2 Views PA/Lat (11.30.22 @ 22:23)   IMPRESSION:  Minimal linear atelectasis at the lung bases with trace likely   right-sided pleural effusion, although evaluation is somewhat limited   secondary to costophrenic angles being excluded from the AP radiograph.  No pneumothorax.  Cardiac size is within normal limits.  There is no acute osseous abnormality.    CT Head No Cont (11.30.22 @ 22:34)  FINDINGS:  No acute intracranial hemorrhage or suspicious extra-axial fluid   collection. No focal edema or acute mass effect. Chronic right thalamic   lacunar infarct. Mild to moderate chronic microvascular ischemic changes.   Mild cerebral atrophy. No hydrocephalus or central herniation. Basilar   cisterns are clear. Minimal paranasal sinus mucosal disease. No  The soft tissues of the scalp are unremarkable. The calvarium is intact.  IMPRESSION:  No acute intracranial CT finding.

## 2022-12-01 NOTE — H&P ADULT - NSHPLABSRESULTS_GEN_ALL_CORE
CKMB Units: 7.7 ng/mL   CPK Mass Assay %: 0.2 % Creatine Kinase, Serum: 3256: TEST REPEATED. U/L   Phosphorus Level, Serum: 3.3 mg/dL Magnesium, Serum: 2.0: CKMB Units: 7.7 ng/mL   CPK Mass Assay %: 0.2 % Creatine Kinase, Serum: 3256: TEST REPEATED. U/L   Phosphorus Level, Serum: 3.3 mg/dL Magnesium, Serum: 2.0:  Comprehensive Metabolic Panel Discharge (12.01.22 @ 06:35)   Sodium, Serum: 139 mmol/L   Potassium, Serum: 4.6 mmol/L   Chloride, Serum: 107 mmol/L   Carbon Dioxide, Serum: 23 mmol/L   Anion Gap, Serum: 9 mmol/L   Blood Urea Nitrogen, Serum: 25 mg/dL   Creatinine, Serum: 1.69 mg/dL   Glucose, Serum: 171 mg/dL   Calcium, Total Serum: 8.7 mg/dL   Protein Total, Serum: 6.0 g/dL   Albumin, Serum: 3.5 g/dL   Bilirubin Total, Serum: 0.4 mg/dL   Alkaline Phosphatase, Serum: 39 U/L   Aspartate Aminotransferase (AST/SGOT): 53 U/L   Alanine Aminotransferase (ALT/SGPT): 41 U/L Troponin T, High Sensitivity Result: 91: Specimen not hemolyzed   Serum Pro-Brain Natriuretic Peptide: 3355 pg/mL (11.30.22 @ 22:14)   < from: CT Head No Cont (11.30.22 @ 22:34) >        < end of copied text >    < from: Xray Chest 2 Views PA/Lat (11.30.22 @ 22:23) >    IMPRESSION:  Minimal linear atelectasis at the lung bases with trace likely   right-sided pleural effusion, although evaluation is somewhat limited   secondary to costophrenic angles being excluded from the AP radiograph.  No pneumothorax.  Cardiac size is within normal limits.  There is no acute osseous abnormality.    --- End of Report ---    < end of copied text >

## 2022-12-01 NOTE — H&P ADULT - HISTORY OF PRESENT ILLNESS
71 y/o M PMH IDDM, HTN, HLD p/w night time seizure like activity with episodes of hypoglycemia as well as shortness of breath when laying flat and with exertion. Patient's son has witnessed night time seizure activity 2-3 times since the past two weeks. Per patient, son describes patient kicking and moving extremities. During these episodes, patient is awoken, with normal return to baseline, and fingerstick shows BS in the 30-50. Patient at these times feels lightheaded. Patient is administered juice with resolution of the episodes. Of note, patient has recently been taking his 2nd dose of novolog at bedtime (54 units) and his long acting levemir at dinner time.     Patient also endorses difficulty lying flat and night time awakening when sleeping trying to catch breath. Additionally, describes exertional SOB despite being previously very active (, cycler). Denies any episodes of chest pain, palpitations, cough, fevers, chills, increased sputum production.

## 2022-12-01 NOTE — MEDICAL STUDENT ADULT H&P (EDUCATION) - NS MD HP STUD PE VITALS FT
Vital Signs Last 24 Hrs  T(C): 36.7 (01 Dec 2022 14:11), Max: 36.8 (01 Dec 2022 04:00)  T(F): 98 (01 Dec 2022 14:11), Max: 98.3 (01 Dec 2022 04:00)  HR: 124 (01 Dec 2022 14:11) (123 - 130)  BP: 131/90 (01 Dec 2022 14:11) (108/76 - 159/109)  BP(mean): 104 (01 Dec 2022 14:11) (95 - 106)  RR: 16 (01 Dec 2022 14:11) (16 - 20)  SpO2: 96% (01 Dec 2022 14:11) (95% - 99%)    Parameters below as of 01 Dec 2022 11:40  Patient On (Oxygen Delivery Method): room air

## 2022-12-01 NOTE — H&P ADULT - NSHPPHYSICALEXAM_GEN_ALL_CORE
T(C): 36.7 (12-01-22 @ 14:11), Max: 36.8 (12-01-22 @ 04:00)  HR: 124 (12-01-22 @ 14:11) (123 - 130)  BP: 131/90 (12-01-22 @ 14:11) (108/76 - 159/109)  RR: 16 (12-01-22 @ 14:11) (16 - 20)  SpO2: 96% (12-01-22 @ 14:11) (95% - 99%)    GENERAL: patient appears well, no acute distress, appropriate, pleasant  EYES: sclera clear, no exudates  ENMT: oropharynx clear without erythema, no exudates, moist mucous membranes  NECK: supple, soft, no thyromegaly noted  LUNGS: diminished breath sounds at right base, no obvious crackles heard, no wheezing  HEART: soft S1/S2, regular rate and rhythm, no murmurs noted, 1+ pitting lower extremity edema  GASTROINTESTINAL: abdomen is soft, nontender, nondistended, normoactive bowel sounds, no palpable masses  INTEGUMENT: good skin turgor, no lesions noted  MUSCULOSKELETAL: no clubbing or cyanosis, no obvious deformity  NEUROLOGIC: awake, alert, oriented x3, good muscle tone in 4 extremities, no obvious sensory deficits  PSYCHIATRIC: mood is good, affect is congruent, linear and logical thought process  HEME/LYMPH: no palpable supraclavicular nodules, no obvious ecchymosis or petechiae

## 2022-12-01 NOTE — H&P ADULT - ASSESSMENT
73 y/o M PMH IDDM, HTN, HLD p/w SOB and night time "seizures" with associated hypoglycemia. SOB with associated elevated proBNP, orthopnea, and pleural effusion most concerning for ADHF vs. pulm HTN resulting in right sided heart disease.

## 2022-12-01 NOTE — MEDICAL STUDENT ADULT H&P (EDUCATION) - NS MD HP STUD ROS GI FT
no nausea, no vomiting, no diarrhea, no constipation, no abdominal pain, no heartburn, no hematemesis, no change in bowel habits

## 2022-12-01 NOTE — H&P ADULT - NSHPREVIEWOFSYSTEMS_GEN_ALL_CORE
REVIEW OF SYSTEMS:    CONSTITUTIONAL: No weakness, fevers or chills  EYES/ENT: No visual changes;  No vertigo or throat pain   NECK: No pain or stiffness  RESPIRATORY: No cough, wheezing, hemoptysis; Shortness of breath when lying flat or walking  CARDIOVASCULAR: No chest pain or palpitations  GASTROINTESTINAL: No abdominal or epigastric pain. No nausea, vomiting, or hematemesis; No diarrhea or constipation. No melena or hematochezia.  GENITOURINARY: No dysuria, frequency or hematuria  NEUROLOGICAL: No numbness or weakness  SKIN: No itching, rashes

## 2022-12-01 NOTE — MEDICAL STUDENT ADULT H&P (EDUCATION) - NS MD HP STUD ASPLAN PLAN FT
Problem 1: Acute decompensated heart failure    Problem 2: Rhabdomyolysis    Problem 3: TATI    Problem 4: Hypoglycemia with nocturnal seizure-like activity    Problem 5: Elevated troponin    Problem 6:  Transaminitis    Problem 7: HTN    Problem 8: HLD Problem 1: Acute decompensated heart failure  - Pitting edema, recent weight gain, worsening exertional dyspnea, orthopnea, PND and elevated pro-BNP highly suggest heart failure  - oxygen saturation has been >96% on room air  - obtain trans-thoracic echocardiogram  - hold diuretics for now in the setting of TATI as well as likely rhabdomyolysis for which fluids would be administered  -Off of metoprolol the patient is tachycardic which could be baseline or rebound; consider continuing to hold for allowance of physiologic compensatory tachycardia in the setting of HF  - holding ACEi in the setting of TATI, consider switching to ARB/ARNI for more optimal GDMT  - monitor strict inputs and outputs  - monitor daily standing weights    Problem 2: Rhabdomyolysis  - Elevated CK in the setting of possible seizures secondary to hypoglycemia  - he received 2 vaccinations within the past 3 weeks which could precipitate rhabdomyolysis, but less likely  - Creatinine uptrended after administration of 500cc fluids while in the ED  - continue with hydration of 100cc NS for 10 hours to treat presumptive rhabdomyolysis but watch carefully for signs/symptoms of fluid overload in the setting of ADHF    Problem 3: TATI  - Creatinine/BUN elevated on admission, as well as +UA for moderate blood and RBCs, in the setting of likely rhabdomyolysis, with improvement after administration of 500cc NS while in the ED  - Contact PCP to inquire about his baseline renal function  - avoid nephrotoxic agents  - continue to monitor creatinine daily  - consider repeating UA to ensure no more blood or RBCs    Problem 4: Insulin-dependent T2DM and possible nocturnal hypoglycemic seizure  - His home regimen of insulin was Novolog (54 units) with meals and Levemir (80 units) qD as per his Endocrinologist's recommendation; last HgA1c approximately 10% in October, as per the patient  - According to the patient's history, it is very likely that he has been improperly administering his medications while at home which would cause him to become hypoglycemic  - Weight based dose while in the hospital, starting on 0.3mg/kg/day, basal/bolus 17units at bedtime/6 units before meals  - Adjust insulin as needed based on sliding scale requirements  - Monitor fingerstick qhs  - repeat HgA1c  - Consider adding SGLT2i in the setting of likely heart failure for more optimal GDMT    Problem 5: Elevated troponin  - Peak of 96 with increased CKMB; trend no longer needed  - likely secondary to type II NSTEMI (demand ischemia in the setting of hypoglycemia and likely impaired cardiac function given presentation strongly suggesting ADHF)    Problem 6:  Transaminitis  - Mildly elevated LFTs on admission without history of liver disease   - likely secondary to rhabdomyolysis/muscle damage (cardiac or skeletal)  - continue to monitor AST/ALT daily    Problem 7: HTN  - Blood pressure has been well controlled since admission  - continue with amlodipine for BP control  - hold ACEi in the setting of TATI  - Consider switching amlodipine to ARNI for more optimal GDMT    Problem 8: HLD  - patient reports no change in statin dosage over the past several years  - continue with atorvastatin 80mg  - Lipid panel    Problem 9: Preventative measures  - DVT prophylaxis with subcutaneous heparin  - diabetic diet

## 2022-12-01 NOTE — MEDICAL STUDENT ADULT H&P (EDUCATION) - NS MD HP STUD PE GI FT
abdomen is mildly distended, nontender to light and deep palpation with normoactive bowel sounds, resonant to percussion in all 4 quadrants; no hepatosplenomegaly, no masses palpated; negative CVA tenderness; indeterminant fluid wave Psych/Behavioral

## 2022-12-01 NOTE — H&P ADULT - PROBLEM SELECTOR PLAN 2
Elevated CK and + Blood in urine c/f rhabdomyolysis i/s/o hypoglycemic seizures  -Cr 2.02 on presentation; improved with fluids  -consider gentle hydration (100 cc for 10 hours i/s/o likely ADHF)  -ctm cr  -consdier repeat UA later this admission to ensure no RBCs

## 2022-12-01 NOTE — MEDICAL STUDENT ADULT H&P (EDUCATION) - NS MD HP STUD CHIEF COMPLAINT FT
CC: 72 year old male with "seizures while asleep" for the past 3 days.  Reason for admission: Hypoglycemia episodes and new onset orthopnea with exertional dyspnea CC: 72 year old male with "seizures while asleep" for the past 3 days.  Reason for admission: Hypoglycemia episodes and new onset orthopnea, paroxysmal nocturnal dyspnea with exertional dyspnea

## 2022-12-02 DIAGNOSIS — I50.9 HEART FAILURE, UNSPECIFIED: ICD-10-CM

## 2022-12-02 DIAGNOSIS — I49.9 CARDIAC ARRHYTHMIA, UNSPECIFIED: ICD-10-CM

## 2022-12-02 LAB
A1C WITH ESTIMATED AVERAGE GLUCOSE RESULT: 9.7 % — HIGH (ref 4–5.6)
ALBUMIN SERPL ELPH-MCNC: 3.5 G/DL — SIGNIFICANT CHANGE UP (ref 3.3–5)
ALP SERPL-CCNC: 38 U/L — LOW (ref 40–120)
ALT FLD-CCNC: 35 U/L — SIGNIFICANT CHANGE UP (ref 10–45)
ANION GAP SERPL CALC-SCNC: 11 MMOL/L — SIGNIFICANT CHANGE UP (ref 5–17)
APTT BLD: 29 SEC — SIGNIFICANT CHANGE UP (ref 27.5–35.5)
AST SERPL-CCNC: 41 U/L — HIGH (ref 10–40)
BILIRUB SERPL-MCNC: 0.4 MG/DL — SIGNIFICANT CHANGE UP (ref 0.2–1.2)
BUN SERPL-MCNC: 27 MG/DL — HIGH (ref 7–23)
CALCIUM SERPL-MCNC: 8.8 MG/DL — SIGNIFICANT CHANGE UP (ref 8.4–10.5)
CHLORIDE SERPL-SCNC: 107 MMOL/L — SIGNIFICANT CHANGE UP (ref 96–108)
CK SERPL-CCNC: 1921 U/L — HIGH (ref 30–200)
CO2 SERPL-SCNC: 24 MMOL/L — SIGNIFICANT CHANGE UP (ref 22–31)
CREAT SERPL-MCNC: 1.86 MG/DL — HIGH (ref 0.5–1.3)
EGFR: 38 ML/MIN/1.73M2 — LOW
ESTIMATED AVERAGE GLUCOSE: 232 MG/DL — HIGH (ref 68–114)
GLUCOSE BLDC GLUCOMTR-MCNC: 135 MG/DL — HIGH (ref 70–99)
GLUCOSE BLDC GLUCOMTR-MCNC: 138 MG/DL — HIGH (ref 70–99)
GLUCOSE BLDC GLUCOMTR-MCNC: 155 MG/DL — HIGH (ref 70–99)
GLUCOSE BLDC GLUCOMTR-MCNC: 187 MG/DL — HIGH (ref 70–99)
GLUCOSE SERPL-MCNC: 132 MG/DL — HIGH (ref 70–99)
HCT VFR BLD CALC: 37.4 % — LOW (ref 39–50)
HCV AB S/CO SERPL IA: 0.06 S/CO — SIGNIFICANT CHANGE UP (ref 0–0.99)
HCV AB SERPL-IMP: SIGNIFICANT CHANGE UP
HGB BLD-MCNC: 11.6 G/DL — LOW (ref 13–17)
INR BLD: 1.09 RATIO — SIGNIFICANT CHANGE UP (ref 0.88–1.16)
MAGNESIUM SERPL-MCNC: 2.2 MG/DL — SIGNIFICANT CHANGE UP (ref 1.6–2.6)
MCHC RBC-ENTMCNC: 29.3 PG — SIGNIFICANT CHANGE UP (ref 27–34)
MCHC RBC-ENTMCNC: 31 GM/DL — LOW (ref 32–36)
MCV RBC AUTO: 94.4 FL — SIGNIFICANT CHANGE UP (ref 80–100)
NRBC # BLD: 0 /100 WBCS — SIGNIFICANT CHANGE UP (ref 0–0)
PHOSPHATE SERPL-MCNC: 3.5 MG/DL — SIGNIFICANT CHANGE UP (ref 2.5–4.5)
PLATELET # BLD AUTO: 233 K/UL — SIGNIFICANT CHANGE UP (ref 150–400)
POTASSIUM SERPL-MCNC: 4.4 MMOL/L — SIGNIFICANT CHANGE UP (ref 3.5–5.3)
POTASSIUM SERPL-SCNC: 4.4 MMOL/L — SIGNIFICANT CHANGE UP (ref 3.5–5.3)
PROT SERPL-MCNC: 6 G/DL — SIGNIFICANT CHANGE UP (ref 6–8.3)
PROTHROM AB SERPL-ACNC: 12.6 SEC — SIGNIFICANT CHANGE UP (ref 10.5–13.4)
RBC # BLD: 3.96 M/UL — LOW (ref 4.2–5.8)
RBC # FLD: 13.9 % — SIGNIFICANT CHANGE UP (ref 10.3–14.5)
SODIUM SERPL-SCNC: 142 MMOL/L — SIGNIFICANT CHANGE UP (ref 135–145)
WBC # BLD: 4.77 K/UL — SIGNIFICANT CHANGE UP (ref 3.8–10.5)
WBC # FLD AUTO: 4.77 K/UL — SIGNIFICANT CHANGE UP (ref 3.8–10.5)

## 2022-12-02 PROCEDURE — 99233 SBSQ HOSP IP/OBS HIGH 50: CPT

## 2022-12-02 PROCEDURE — 99222 1ST HOSP IP/OBS MODERATE 55: CPT

## 2022-12-02 PROCEDURE — 93010 ELECTROCARDIOGRAM REPORT: CPT

## 2022-12-02 RX ORDER — HEPARIN SODIUM 5000 [USP'U]/ML
INJECTION INTRAVENOUS; SUBCUTANEOUS
Qty: 25000 | Refills: 0 | Status: DISCONTINUED | OUTPATIENT
Start: 2022-12-02 | End: 2022-12-08

## 2022-12-02 RX ORDER — EZETIMIBE 10 MG/1
1 TABLET ORAL
Qty: 0 | Refills: 0 | DISCHARGE

## 2022-12-02 RX ORDER — FUROSEMIDE 40 MG
20 TABLET ORAL ONCE
Refills: 0 | Status: COMPLETED | OUTPATIENT
Start: 2022-12-02 | End: 2022-12-02

## 2022-12-02 RX ORDER — FUROSEMIDE 40 MG
20 TABLET ORAL
Refills: 0 | Status: DISCONTINUED | OUTPATIENT
Start: 2022-12-02 | End: 2022-12-04

## 2022-12-02 RX ORDER — METOPROLOL TARTRATE 50 MG
100 TABLET ORAL DAILY
Refills: 0 | Status: DISCONTINUED | OUTPATIENT
Start: 2022-12-02 | End: 2022-12-02

## 2022-12-02 RX ORDER — METOPROLOL TARTRATE 50 MG
100 TABLET ORAL DAILY
Refills: 0 | Status: DISCONTINUED | OUTPATIENT
Start: 2022-12-03 | End: 2022-12-10

## 2022-12-02 RX ADMIN — Medication 20 MILLIGRAM(S): at 10:42

## 2022-12-02 RX ADMIN — HEPARIN SODIUM 2000 UNIT(S)/HR: 5000 INJECTION INTRAVENOUS; SUBCUTANEOUS at 17:06

## 2022-12-02 RX ADMIN — HEPARIN SODIUM 5000 UNIT(S): 5000 INJECTION INTRAVENOUS; SUBCUTANEOUS at 05:32

## 2022-12-02 RX ADMIN — HEPARIN SODIUM 5000 UNIT(S): 5000 INJECTION INTRAVENOUS; SUBCUTANEOUS at 15:00

## 2022-12-02 RX ADMIN — Medication 1: at 17:07

## 2022-12-02 RX ADMIN — Medication 81 MILLIGRAM(S): at 10:43

## 2022-12-02 RX ADMIN — Medication 6 UNIT(S): at 12:00

## 2022-12-02 RX ADMIN — INSULIN GLARGINE 17 UNIT(S): 100 INJECTION, SOLUTION SUBCUTANEOUS at 21:52

## 2022-12-02 RX ADMIN — Medication 6 UNIT(S): at 17:08

## 2022-12-02 RX ADMIN — AMLODIPINE BESYLATE 10 MILLIGRAM(S): 2.5 TABLET ORAL at 05:32

## 2022-12-02 RX ADMIN — Medication 6 UNIT(S): at 08:39

## 2022-12-02 RX ADMIN — Medication 100 MILLIGRAM(S): at 10:42

## 2022-12-02 RX ADMIN — Medication 20 MILLIGRAM(S): at 17:05

## 2022-12-02 NOTE — PROGRESS NOTE ADULT - PROBLEM SELECTOR PLAN 2
Elevated CK and + Blood in urine c/f rhabdomyolysis i/s/o hypoglycemic seizures  -Cr 2.02 on presentation; improved with fluids  -consider gentle hydration (100 cc for 10 hours i/s/o likely ADHF)  -ctm cr  -consdier repeat UA later this admission to ensure no RBCs Irregular irregular ryhthm noted on Telemetry  c/f A fib  -f/u cardiology recs  -CHADSVASC 4

## 2022-12-02 NOTE — MEDICAL STUDENT PROGRESS NOTE(EDUCATION) - NS MD HP STUD ASPLAN PLAN FT
Problem/Plan - 1: Acute decompensated heart failure  - Patient has history of orthopnea, PND, new onset exertional dyspnea, subjective weight gain in the past 1 month, with labs revealing elevated pro-BNP, all suggestive of ADHF  -Echocardiogram (TTE) revealed HFrEF (EF=39%), severe concentric LV remodeling, moderate global LV systolic dysfunction, calcified aortic valve, in addition to borderline pulmonary HTN  -saturating >96% on room air  - As per Cardiology recommendations: begin with Lasix 20mg IV BID as pt appears to be Lasix naive and uptitrate for goal net negative 1.5-2L  - If adequate diuresis is achieved, an eventual angiogram can be planned, likely Monday  - Strict I/Os and weights   - Pt will need addition of additional GDMT with improvement in kidney function  -s/p Lasix IV 20 today; f/u volume status   -c/w metoprolol succinate     Problem/Plan - 2: Rhabdomyolysis  - Elevated CK in the setting of possible seizures secondary to hypoglycemia  - he received 2 vaccinations within the past 3 weeks which could precipitate rhabdomyolysis, but less likely  - Creatinine uptrended after administration of 500cc fluids while in the ED  - trend CK until normalization     Problem/Plan - 3: Cardiac arrhythmia  - tachycardia with rhythm suggesting AFib vs. AFlutter with variable block  - appreciate Cardiology recs  -[id number] score 4 (4.8% stroke risk; start anticoagulation)     Problem/Plan - 4: TATI  - Elevated CK and moderate blood in urine with +RBCs suggests rhabdomyolysis in the setting of possible hypoglycemic seizures  -Cr 2.02 on presentation; improved with fluids; slightly increased creatinine today (1.69 --> 1.86)  - PCP office was contacted and reports patient to carry a dx of CKD stage 3b since 5 years ago; BUN/Cr on 8/12/22 was 30/2.01, as per their record, however unable to confirm that this is his "baseline"  -CK downtrending  -continue to monitor Cr  -Patient's PCP office also revealed he carries a dx of hematuria and UA on 8/12/22 showed +RBCs and +protein. Microalbuminuria/creatinine was 233 at that time Consider repeat UA later this admission to ensure no blood or RBCs.     Problem/Plan - 5: Insulin-dependent T2DM and possible nocturnal hypoglycemic seizure  - His home regimen of insulin was Novolog (54 units) with meals and Levemir (80 units) qD as per his Endocrinologist's recommendation  - According to the patient's history, it is very likely that he has been improperly administering his medications while at home which would cause him to become hypoglycemic  - Weight based dose while in the hospital, starting on 0.3mg/kg/day, basal/bolus 17units at bedtime/6 units before meals  - Adjust insulin as needed based on sliding scale requirements  - Monitor fingerstick qhs  - HgA1c 11.0% on 8/12/2022 as per PCP  - Consider adding SGLT2i in the setting of likely heart failure for more optimal GDM.     Problem/Plan - 6: Transaminitis  - Mildly elevated LFTs on admission without history of liver disease, improving to 16/16    - likely secondary to rhabdomyolysis/muscle damage (cardiac or skeletal)  - continue to monitor     Problem/Plan - 7: Elevated troponin  - Peak of 96 with increased CKMB; trend no longer needed  - likely secondary to type II NSTEMI     Problem/Plan - 8: Hypertension  - Blood pressure has been well controlled since admission  - continue with amlodipine for BP control  - hold ACEi in the setting of TATI  - Consider switching amlodipine to ARNI for more optimal GDMT  - appreciate Cardiology recs     Problem/Plan - 9: Hyperlipidemia  - patient reports no change in statin dosage over the past several years  - continue with atorvastatin 80mg  - Lipid panel     Problem/Plan - 10: Preventative measures  - DVT prophylaxis with subcutaneous heparin  - diabetic diet

## 2022-12-02 NOTE — PROGRESS NOTE ADULT - PROBLEM SELECTOR PLAN 4
Elevated LFTs likely 2/2 rhabdo/muscle damage  >ctm Per endocrinologist, patient on 80 BID levemir and 45 TID novolog with meals  Unclear compliance per patient history; concern for over medication resulting in hypoglycemic episodes  -started with weight base dosing for insulin (0.3 mg/kg/day) (17 units at bedtime, 6 units premeal)  -low dose ISS (only required 1 additional unit yesterday)  -adjust Insulin as needed based on sliding scale requirements

## 2022-12-02 NOTE — PROGRESS NOTE ADULT - PROBLEM SELECTOR PLAN 3
Per endocrinologist, patient on 80 BID levemir and 45 TID novolog with meals  Unclear compliance per patient history; concern for over medication resulting in hypoglycemic episodes  -started with weight base dosing for insulin (0.3 mg/kg/day) (17 units at bedtime, 6 units premeal)  -low dose ISS  -adjust Insulin as needed based on sliding scale requirements Elevated CK and + Blood in urine c/f rhabdomyolysis i/s/o hypoglycemic seizures  -Cr 2.02 on presentation; improved with fluids; slightly increased creatinine today (1.6 --> 1.8) likely 2/2 cardiorenal now  -CK downtrending  -ctm cr  -consider repeat UA later this admission to ensure no RBCs

## 2022-12-02 NOTE — PROGRESS NOTE ADULT - ATTENDING COMMENTS
72M with T2DM on insulin, HTN, HLD here with possible seizures 2/2 hypoglycemia likely from misunderstanding of insulin regimen, complicated by rhabdomyolysis and with worsening exercise tolerance secondary to possible acute heart failure.     EKG personally reviewed aflutter w/ variable block    # ADHF  Mildly increasing pitting edema per patient w/ worsening exercise tolerance and paroxysmal nocturnal dyspnea suggestive of heart failure, with elevated BMP  - echo with LVSD, EF 39%. already on ACE (held for tati), can transition to entresto when TATI resolves and after cath  - strict I&Os  - daily standing weights  - cardiology consult - eventual ischemic eval when euvolemic  - diuresis today as he is more volume overloaded    # Aflutter  - heparin gtt (CHADsVASC at least 4)  - cards consult    # Rhabdo  Mildly elevated CK i/s/o possible seizures 2/2 hypoglycemia  - Cr improved with 500cc in ED + gentle hydration 12/1  - trend CK til normalization    # TATI  Elevated Cr i/s/o rhabdo, need to find out baseline (Cr 2 per pcp?)  - Avoid nephrotoxic agents  - Trend Cr daily    # T2DM  On basal/bolus 80BID/45qac per endocrinologist office. However, pt is confused about his exact regimen.  - weight base dose for now given he may not be taking his insulin correctly and he has been hypoglycemic at home  - monitor FS qac qhs   - basal/bolus 17/6  - a1c, lipid panel    Rest of plan as above. Discussed with HS.

## 2022-12-02 NOTE — PATIENT PROFILE ADULT - FALL HARM RISK - FALLEN IN PAST
44 year old male, with past history significant for Anxiety, Depression, Alcohol abuse (last use ~ 2017), and Smoking, admitted with infected bug bites/cellulitis No

## 2022-12-02 NOTE — PROVIDER CONTACT NOTE (OTHER) - ACTION/TREATMENT ORDERED:
MD made aware pt's HR now in low 100s, will hold off metoprolol and monitor if recurrence of HR >130's

## 2022-12-02 NOTE — MEDICAL STUDENT PROGRESS NOTE(EDUCATION) - SUBJECTIVE AND OBJECTIVE BOX
PROGRESS NOTE:   Authored by Chaz Bonilla, MS3    Patient is a 72y old Male with PMHx of T2DM, HTN, HLD, who presented to the ED with complaint of hypoglycemic episodes and new onset exertional dyspnea with orthopnea, admitted with concern for HF exacerbation  (02 Dec 2022 13:41)      SUBJECTIVE / OVERNIGHT EVENTS: no acute events overnight    Patient states that he had an uneventful night; he endorses having slept well, is eating well, having regular bowel movements and urinating with no issues. He denies any new pain or sx of dizziness/lightheadedness, chest pain, palpitations, SOB, chest tightness, nausea, vomiting, abdominal pain.     ADDITIONAL REVIEW OF SYSTEMS:    MEDICATIONS  (STANDING):  amLODIPine   Tablet 10 milliGRAM(s) Oral daily  aspirin  chewable 81 milliGRAM(s) Oral daily  dextrose 5%. 1000 milliLiter(s) (100 mL/Hr) IV Continuous <Continuous>  dextrose 5%. 1000 milliLiter(s) (50 mL/Hr) IV Continuous <Continuous>  dextrose 50% Injectable 25 Gram(s) IV Push once  dextrose 50% Injectable 12.5 Gram(s) IV Push once  dextrose 50% Injectable 25 Gram(s) IV Push once  furosemide   Injectable 20 milliGRAM(s) IV Push two times a day  glucagon  Injectable 1 milliGRAM(s) IntraMuscular once  heparin  Infusion.  Unit(s)/Hr (20 mL/Hr) IV Continuous <Continuous>  insulin glargine Injectable (LANTUS) 17 Unit(s) SubCutaneous at bedtime  insulin lispro (ADMELOG) corrective regimen sliding scale   SubCutaneous three times a day before meals  insulin lispro Injectable (ADMELOG) 6 Unit(s) SubCutaneous three times a day before meals  lactated ringers. 1000 milliLiter(s) (100 mL/Hr) IV Continuous <Continuous>    MEDICATIONS  (PRN):  dextrose Oral Gel 15 Gram(s) Oral once PRN Blood Glucose LESS THAN 70 milliGRAM(s)/deciliter      CAPILLARY BLOOD GLUCOSE      POCT Blood Glucose.: 135 mg/dL (02 Dec 2022 11:16)  POCT Blood Glucose.: 138 mg/dL (02 Dec 2022 08:02)  POCT Blood Glucose.: 179 mg/dL (01 Dec 2022 23:12)  POCT Blood Glucose.: 228 mg/dL (01 Dec 2022 21:19)    I&O's Summary    02 Dec 2022 07:01  -  02 Dec 2022 16:36  --------------------------------------------------------  IN: 405 mL / OUT: 400 mL / NET: 5 mL    Vital Signs Last 24 Hrs  T(C): 36.8 (02 Dec 2022 11:28), Max: 36.8 (02 Dec 2022 08:00)  T(F): 98.2 (02 Dec 2022 11:28), Max: 98.2 (02 Dec 2022 08:00)  HR: 100 (02 Dec 2022 11:) (67 - 130)  BP: 119/79 (02 Dec 2022 11:) (116/73 - 133/91)  BP(mean): --  RR: 18 (02 Dec 2022 11:) (16 - 18)  SpO2: 95% (02 Dec 2022 11:) (95% - 98%)      PHYSICAL EXAM:    GENERAL: Well-appearing male of stated age in no acute distress, laying comfortably in bed  HEAD:  Atraumatic, Normocephalic  EYES: EOMI, PERRLA, conjunctiva and sclera clear  NECK: Supple, no lymphadenopathy, no JVD  CHEST/LUNG: CTAB; No wheezes, rales, or rhonchi  HEART: Regular rate and rhythm; No murmurs, rubs, or gallops  ABDOMEN: Soft, non-tender, non-distended; normal bowel sounds, no organomegaly  EXTREMITIES:  2+ peripheral pulses b/l, No clubbing, cyanosis, or edema  NEUROLOGY: A&O x 3, no focal deficits  SKIN: No rashes or lesions    LABS:                        11.6   4.77  )-----------( 233      ( 02 Dec 2022 05:42 )             37.4     12-02    142  |  107  |  27<H>  ----------------------------<  132<H>  4.4   |  24  |  1.86<H>    Ca    8.8      02 Dec 2022 05:42  Phos  3.5     12-02  Mg     2.2         TPro  6.0  /  Alb  3.5  /  TBili  0.4  /  DBili  x   /  AST  41<H>  /  ALT  35  /  AlkPhos  38<L>        CARDIAC MARKERS ( 02 Dec 2022 05:42 )  x     / x     / 1921 U/L / x     / x      CARDIAC MARKERS ( 01 Dec 2022 06:35 )  x     / x     / 3256 U/L / x     / 7.7 ng/mL  CARDIAC MARKERS ( 2022 22:14 )  x     / x     / 5173 U/L / x     / x          Urinalysis Basic - ( 01 Dec 2022 00:09 )    Color: Light Yellow / Appearance: Clear / S.017 / pH: x  Gluc: x / Ketone: Negative  / Bili: Negative / Urobili: Negative   Blood: x / Protein: 100 / Nitrite: Negative   Leuk Esterase: Negative / RBC: 71 /hpf / WBC 1 /HPF   Sq Epi: x / Non Sq Epi: 0 /hpf / Bacteria: Negative          RADIOLOGY & ADDITIONAL TESTS:  Results Reviewed:   Imaging Personally Reviewed:  Electrocardiogram Personally Reviewed:    COORDINATION OF CARE:  Care Discussed with Consultants/Other Providers [Y/N]:  Prior or Outpatient Records Reviewed [Y/N]:   PROGRESS NOTE:   Authored by Chaz Bonilla, MS3    Patient is a 72y old Male with PMHx of T2DM, HTN, HLD, who presented to the ED with complaint of hypoglycemic episodes and new onset exertional dyspnea with orthopnea, admitted with concern for HF exacerbation  (02 Dec 2022 13:41)      SUBJECTIVE / OVERNIGHT EVENTS: no acute events overnight    Patient states that he had an uneventful night; he endorses having slept well, is eating well, having regular bowel movements and urinating with no issues. He denies any new pain or sx of dizziness/lightheadedness, chest pain, palpitations, SOB, chest tightness, nausea, vomiting, abdominal pain.     ADDITIONAL REVIEW OF SYSTEMS:    MEDICATIONS  (STANDING):  amLODIPine   Tablet 10 milliGRAM(s) Oral daily  aspirin  chewable 81 milliGRAM(s) Oral daily  dextrose 5%. 1000 milliLiter(s) (100 mL/Hr) IV Continuous <Continuous>  dextrose 5%. 1000 milliLiter(s) (50 mL/Hr) IV Continuous <Continuous>  dextrose 50% Injectable 25 Gram(s) IV Push once  dextrose 50% Injectable 12.5 Gram(s) IV Push once  dextrose 50% Injectable 25 Gram(s) IV Push once  furosemide   Injectable 20 milliGRAM(s) IV Push two times a day  glucagon  Injectable 1 milliGRAM(s) IntraMuscular once  heparin  Infusion.  Unit(s)/Hr (20 mL/Hr) IV Continuous <Continuous>  insulin glargine Injectable (LANTUS) 17 Unit(s) SubCutaneous at bedtime  insulin lispro (ADMELOG) corrective regimen sliding scale   SubCutaneous three times a day before meals  insulin lispro Injectable (ADMELOG) 6 Unit(s) SubCutaneous three times a day before meals  lactated ringers. 1000 milliLiter(s) (100 mL/Hr) IV Continuous <Continuous>    MEDICATIONS  (PRN):  dextrose Oral Gel 15 Gram(s) Oral once PRN Blood Glucose LESS THAN 70 milliGRAM(s)/deciliter      CAPILLARY BLOOD GLUCOSE      POCT Blood Glucose.: 135 mg/dL (02 Dec 2022 11:16)  POCT Blood Glucose.: 138 mg/dL (02 Dec 2022 08:02)  POCT Blood Glucose.: 179 mg/dL (01 Dec 2022 23:12)  POCT Blood Glucose.: 228 mg/dL (01 Dec 2022 21:19)  I&O's Summary    02 Dec 2022 07:01  -  02 Dec 2022 16:36  --------------------------------------------------------  IN: 405 mL / OUT: 400 mL / NET: 5 mL    Vital Signs Last 24 Hrs  T(C): 36.8 (02 Dec 2022 11:28), Max: 36.8 (02 Dec 2022 08:00)  T(F): 98.2 (02 Dec 2022 11:), Max: 98.2 (02 Dec 2022 08:00)  HR: 100 (02 Dec 2022 11:) (67 - 130)  BP: 119/79 (02 Dec 2022 11:) (116/73 - 133/91)  BP(mean): --  RR: 18 (02 Dec 2022 11:) (16 - 18)  SpO2: 95% (02 Dec 2022 11:) (95% - 98%)      PHYSICAL EXAM:  GENERAL: Well-appearing male of stated age in no acute distress, laying comfortably in bed  HEAD:  Atraumatic, Normocephalic  EYES: EOMI, PERRLA, conjunctiva and sclera clear  NECK: Supple, no lymphadenopathy, no JVD  CHEST/LUNG: CTAB; No wheezes, rales, or rhonchi  HEART: tachycardic with possible irregular rhythm; S1,S2,No murmurs, rubs, or gallops  ABDOMEN: Soft/firm, non-tender, mildly distended; normoactive bowel sounds in all 4 quadrants, no organomegaly; possible +fluid wave  EXTREMITIES:  2+ peripheral pulses b/l, No clubbing, cyanosis; 2+ pitting edema to the knees b/l  NEUROLOGY: A&O x 3, no focal deficits; CN II-XII grossly intact  SKIN: No rashes or lesions    LABS:                        11.6   4.77  )-----------( 233      ( 02 Dec 2022 05:42 )             37.4   12-02    142  |  107  |  27<H>  ----------------------------<  132<H>  4.4   |  24  |  1.86<H>    Ca    8.8      02 Dec 2022 05:42  Phos  3.5     12-  Mg     2.2     12-    TPro  6.0  /  Alb  3.5  /  TBili  0.4  /  DBili  x   /  AST  41<H>  /  ALT  35  /  AlkPhos  38<L>  12-      CARDIAC MARKERS ( 02 Dec 2022 05:42 )  x     / x     / 1921 U/L / x     / x      CARDIAC MARKERS ( 01 Dec 2022 06:35 )  x     / x     / 3256 U/L / x     / 7.7 ng/mL  CARDIAC MARKERS ( 2022 22:14 )  x     / x     / 5173 U/L / x     / x          Urinalysis Basic - ( 01 Dec 2022 00:09 )    Color: Light Yellow / Appearance: Clear / S.017 / pH: x  Gluc: x / Ketone: Negative  / Bili: Negative / Urobili: Negative   Blood: x / Protein: 100 / Nitrite: Negative   Leuk Esterase: Negative / RBC: 71 /hpf / WBC 1 /HPF   Sq Epi: x / Non Sq Epi: 0 /hpf / Bacteria: Negative    EKG () Tachycardic, ?AF vs. Aflutter with variable block    TTE with Doppler (w/ Cont):  Dimensions:    Normal Values:  LA:     4.4    2.0 - 4.0 cm  Ao:     3.8    2.0 - 3.8 cm  SEPTUM: 1.4    0.6 - 1.2 cm  PWT:    1.4    0.6 - 1.1 cm  LVIDd:  5.4    3.0 - 5.6 cm  LVIDs:  4.6    1.8 - 4.0 cm  Derived variables:  LVMI: 143 g/m2  RWT: 0.51  Fractional short: 15 %  EF (Mcconnell Rule): 39 %Doppler Peak Velocity (m/sec):  AoV=1.5  ------------------------------------------------------------------------  Observations:  Mitral Valve: Tethered mitral valve leaflets with normal  opening. Minimal mitral regurgitation.  Aortic Valve/Aorta: Calcified aortic valve. The non and the  left cusp appear functionally fused by calcification.Unable  to fully rule out bicuspid valve with partial raffe. Peak  transaortic valve gradient equals 9 mm Hg, estimated aortic  valve area equals 2.3 sqcm (by planimetry)  Aortic Root: 3.8 cm.  Ascending Aorta: 4 cm.  LVOT diameter: 2.4 cm.  Left Atrium: Mildly dilated left atrium.  LA volume index =  40 cc/m2.  Left Ventricle: Moderate  global left ventricular systolic  dysfunction. Endocardial visualization enhanced with  intravenous injection of Ultrasonic Enhancing Agent  (Definity). No left ventricular thrombus. Severe concentric  left ventricular remodeling.  Right Heart: Mild right atrial enlargement. Normal right  ventricular size and function. Normal tricuspid valve.  Minimal tricuspid regurgitation. Normal pulmonic valve.  Pericardium/Pleura: Normal pericardium with no pericardial  effusion.  Hemodynamic: Estimated right atrial pressure is 8 mm Hg.  Estimated right ventricular systolic pressure equals 36 mm  Hg, assuming right atrial pressure equals 8 mm Hg,  consistent with borderline pulmonary hypertension.  ------------------------------------------------------------------------  Conclusions:  1. Calcified aortic valve. The non and the left cusp appear  functionally fused by calcification.Unable to fully rule  out bicuspid valve with partial raffe. Peak transaortic  valve gradient equals 9 mm Hg, estimated aortic valve area  equals 2.3 sqcm (by planimetry)  2. Severe concentric left ventricular remodeling.  3. Moderate  global left ventricular systolic dysfunction.  Endocardial visualization enhanced with intravenous  injection of Ultrasonic Enhancing Agent (Definity). No left  ventricular thrombus.  4. Normal right ventricular size and function.  5. Normal tricuspid valve. Minimal tricuspid regurgitation.  *** No previous Echo exam.    Cardiology consult ():  Recommendations:   - CXR : Cardiac size is within normal limits. Trace right sided pleural effusion  - TTE : EF 39%, Calcified aortic valve. The non and the left cusp appear functionally fused by calcification. Peak transaortic valve gradient equals 9 mm Hg, estimated aortic valve area equals 2.3 sqcm. Moderate  global left ventricular systolic dysfunction.  - No prior cath   - Troponin 96 --> 91   - BNP 3355  - Creatinine 2.01 --> 1.69 --> 1.86 (pt reports he has CKD, please investigate baseline Creatinine)   - Initial EKG and telemetry: AF/Aflutter, HRs 90s-100  - Heparin drip for AC given Atrial Fibrillation    - Continue home Metoprolol Succinate 100mg   - Agree with diuresis, may begin with Lasix 20mg IV BID as pt appears to be Lasix naive and uptitrate for goal net negative 1.5-2L  - If adequate diuresis is achieved, an eventual angiogram can be planned, likely Monday  - Strict I/Os and weights   - Pt will need addition of additional GDMT with improvement in kidney function

## 2022-12-02 NOTE — PROGRESS NOTE ADULT - PROBLEM SELECTOR PLAN 1
Orthopnea, elevated proBNP, pulmonary edema suggestive of ADHF  >f/u TTE  -on room air  -holding diuretics i/s/o TATI   -hold home metoprolol to allow for physiologic compensatory tachycardia   -holding ACE-I i/s/o TATI --> consider switching to ARB/ARNI this admission Orthopnea, elevated proBNP, pulmonary edema suggestive of ADHF  -TTE showed HFrEF (EF 39%)  -on room air  -s/p Lasix IV 20 today; f/u volume status   -c/w metoprolol succinate  -holding ACE-I i/s/o TATI --> consider switching to ARB/ARNI this admission  -cardiology consulted for evaluation of new onset HF

## 2022-12-02 NOTE — PROGRESS NOTE ADULT - PROBLEM SELECTOR PLAN 5
Troponin peaked at 96 with elevated CKMB  -no longer needed to trend   -likely 2/2 Type II NSTEMI (demand ischemia) i/s/o ADHF Elevated LFTs likely 2/2 rhabdo/muscle damage; improving  >ctm

## 2022-12-02 NOTE — PROGRESS NOTE ADULT - ASSESSMENT
71 y/o M PMH IDDM, HTN, HLD p/w SOB and night time "seizures" with associated hypoglycemia. SOB with associated elevated proBNP, orthopnea, and pleural effusion most concerning for ADHF vs. pulm HTN resulting in right sided heart disease.  73 y/o M PMH IDDM, HTN, HLD p/w SOB and night time "seizures" with associated hypoglycemia. SOB with associated elevated proBNP, orthopnea, and pleural effusion found to have ADHF (EF 39%) and poor insulin use at home.

## 2022-12-02 NOTE — PROGRESS NOTE ADULT - SUBJECTIVE AND OBJECTIVE BOX
INTERVAL HPI/OVERNIGHT EVENTS:    SUBJECTIVE: Patient seen and examined at bedside.    OBJECTIVE:    VITAL SIGNS:  ICU Vital Signs Last 24 Hrs  T(C): 36.7 (02 Dec 2022 04:20), Max: 36.7 (01 Dec 2022 11:40)  T(F): 98 (02 Dec 2022 04:20), Max: 98 (01 Dec 2022 11:40)  HR: 93 (02 Dec 2022 04:20) (69 - 130)  BP: 123/68 (02 Dec 2022 04:20) (118/83 - 133/91)  BP(mean): 104 (01 Dec 2022 14:11) (95 - 104)  ABP: --  ABP(mean): --  RR: 16 (02 Dec 2022 04:20) (16 - 20)  SpO2: 98% (02 Dec 2022 04:20) (96% - 99%)    O2 Parameters below as of 02 Dec 2022 04:20  Patient On (Oxygen Delivery Method): room air              CAPILLARY BLOOD GLUCOSE      POCT Blood Glucose.: 179 mg/dL (01 Dec 2022 23:12)      PHYSICAL EXAM:    General: NAD  HEENT: NC/AT; PERRL, clear conjunctiva  Neck: supple  Respiratory: CTA b/l  Cardiovascular: +S1/S2; RRR  Abdomen: soft, NT/ND; +BS x4  Extremities:  no LE edema  Vascular: WWP, 2+ peripheral pulses b/l;  Skin: normal color and turgor; no rash  Neurological: A&Ox3, move all extremities. CN II-XII intact    MEDICATIONS:  MEDICATIONS  (STANDING):  amLODIPine   Tablet 10 milliGRAM(s) Oral daily  aspirin  chewable 81 milliGRAM(s) Oral daily  atorvastatin 80 milliGRAM(s) Oral at bedtime  dextrose 5%. 1000 milliLiter(s) (100 mL/Hr) IV Continuous <Continuous>  dextrose 5%. 1000 milliLiter(s) (50 mL/Hr) IV Continuous <Continuous>  dextrose 50% Injectable 25 Gram(s) IV Push once  dextrose 50% Injectable 12.5 Gram(s) IV Push once  dextrose 50% Injectable 25 Gram(s) IV Push once  glucagon  Injectable 1 milliGRAM(s) IntraMuscular once  heparin   Injectable 5000 Unit(s) SubCutaneous every 8 hours  insulin glargine Injectable (LANTUS) 17 Unit(s) SubCutaneous at bedtime  insulin lispro (ADMELOG) corrective regimen sliding scale   SubCutaneous three times a day before meals  insulin lispro Injectable (ADMELOG) 6 Unit(s) SubCutaneous three times a day before meals  lactated ringers. 1000 milliLiter(s) (100 mL/Hr) IV Continuous <Continuous>    MEDICATIONS  (PRN):  dextrose Oral Gel 15 Gram(s) Oral once PRN Blood Glucose LESS THAN 70 milliGRAM(s)/deciliter      ALLERGIES:  Allergies    No Known Allergies    Intolerances        LABS:                        11.6   4.77  )-----------( 233      ( 02 Dec 2022 05:42 )             37.4     12-    142  |  107  |  27<H>  ----------------------------<  132<H>  4.4   |  24  |  1.86<H>    Ca    8.8      02 Dec 2022 05:42  Phos  3.5     12  Mg     2.2         TPro  6.0  /  Alb  3.5  /  TBili  0.4  /  DBili  x   /  AST  41<H>  /  ALT  35  /  AlkPhos  38<L>  12      Urinalysis Basic - ( 01 Dec 2022 00:09 )    Color: Light Yellow / Appearance: Clear / S.017 / pH: x  Gluc: x / Ketone: Negative  / Bili: Negative / Urobili: Negative   Blood: x / Protein: 100 / Nitrite: Negative   Leuk Esterase: Negative / RBC: 71 /hpf / WBC 1 /HPF   Sq Epi: x / Non Sq Epi: 0 /hpf / Bacteria: Negative        RADIOLOGY & ADDITIONAL TESTS: Reviewed. INTERVAL HPI/OVERNIGHT EVENTS: VIVEK OVN.     SUBJECTIVE: Patient seen and examined at bedside. Patient states he slept well overnight with no episodes of "seizure like activity" or shortness of breath. Otherwise denies palpitations, SOB, chest pain, lightheadedness, cough. Does endorse he slept with head of bed at 30-45 degree angle.    OBJECTIVE:    VITAL SIGNS:  ICU Vital Signs Last 24 Hrs  T(C): 36.7 (02 Dec 2022 04:20), Max: 36.7 (01 Dec 2022 11:40)  T(F): 98 (02 Dec 2022 04:20), Max: 98 (01 Dec 2022 11:40)  HR: 93 (02 Dec 2022 04:20) (69 - 130)  BP: 123/68 (02 Dec 2022 04:20) (118/83 - 133/91)  BP(mean): 104 (01 Dec 2022 14:11) (95 - 104)  ABP: --  ABP(mean): --  RR: 16 (02 Dec 2022 04:20) (16 - 20)  SpO2: 98% (02 Dec 2022 04:20) (96% - 99%)    O2 Parameters below as of 02 Dec 2022 04:20  Patient On (Oxygen Delivery Method): room air              CAPILLARY BLOOD GLUCOSE      POCT Blood Glucose.: 179 mg/dL (01 Dec 2022 23:12)      PHYSICAL EXAM:    General: NAD  HEENT: NC/AT; PERRL, clear conjunctiva  Neck: supple  Respiratory: CTA b/l  Cardiovascular: +S1/S2; Tachycardic, no rubs, gallops or murmurs  Abdomen: soft, NT/ND; +BS x4  Extremities: 2+ lower extremity edema   Vascular: WWP, 2+ peripheral pulses b/l;  Skin: normal color and turgor; no rash  Neurological: A&Ox3, move all extremities.     MEDICATIONS:  MEDICATIONS  (STANDING):  amLODIPine   Tablet 10 milliGRAM(s) Oral daily  aspirin  chewable 81 milliGRAM(s) Oral daily  atorvastatin 80 milliGRAM(s) Oral at bedtime  dextrose 5%. 1000 milliLiter(s) (100 mL/Hr) IV Continuous <Continuous>  dextrose 5%. 1000 milliLiter(s) (50 mL/Hr) IV Continuous <Continuous>  dextrose 50% Injectable 25 Gram(s) IV Push once  dextrose 50% Injectable 12.5 Gram(s) IV Push once  dextrose 50% Injectable 25 Gram(s) IV Push once  glucagon  Injectable 1 milliGRAM(s) IntraMuscular once  heparin   Injectable 5000 Unit(s) SubCutaneous every 8 hours  insulin glargine Injectable (LANTUS) 17 Unit(s) SubCutaneous at bedtime  insulin lispro (ADMELOG) corrective regimen sliding scale   SubCutaneous three times a day before meals  insulin lispro Injectable (ADMELOG) 6 Unit(s) SubCutaneous three times a day before meals  lactated ringers. 1000 milliLiter(s) (100 mL/Hr) IV Continuous <Continuous>    MEDICATIONS  (PRN):  dextrose Oral Gel 15 Gram(s) Oral once PRN Blood Glucose LESS THAN 70 milliGRAM(s)/deciliter      ALLERGIES:  Allergies    No Known Allergies    Intolerances        LABS:                        11.6   4.77  )-----------( 233      ( 02 Dec 2022 05:42 )             37.4     12-    142  |  107  |  27<H>  ----------------------------<  132<H>  4.4   |  24  |  1.86<H>    Ca    8.8      02 Dec 2022 05:42  Phos  3.5       Mg     2.2         TPro  6.0  /  Alb  3.5  /  TBili  0.4  /  DBili  x   /  AST  41<H>  /  ALT  35  /  AlkPhos  38<L>  12      Urinalysis Basic - ( 01 Dec 2022 00:09 )    Color: Light Yellow / Appearance: Clear / S.017 / pH: x  Gluc: x / Ketone: Negative  / Bili: Negative / Urobili: Negative   Blood: x / Protein: 100 / Nitrite: Negative   Leuk Esterase: Negative / RBC: 71 /hpf / WBC 1 /HPF   Sq Epi: x / Non Sq Epi: 0 /hpf / Bacteria: Negative        RADIOLOGY & ADDITIONAL TESTS: Reviewed.  < from: TTE with Doppler (w/Cont) (22 @ 13:46) >  Conclusions:  1. Calcified aortic valve. The non and the left cusp appear  functionally fused by calcification.Unable to fully rule  out bicuspid valve with partial raffe. Peak transaortic  valve gradient equals 9 mm Hg, estimated aortic valve area  equals 2.3 sqcm (by planimetry)  2. Severe concentric left ventricular remodeling.  3. Moderate  global left ventricular systolic dysfunction.  Endocardial visualization enhancedwith intravenous  injection of Ultrasonic Enhancing Agent (Definity). No left  ventricular thrombus.  4. Normal right ventricular size and function.  5. Normal tricuspid valve. Minimal tr    < end of copied text >

## 2022-12-02 NOTE — PROVIDER CONTACT NOTE (OTHER) - SITUATION
notified by [employer], patient's HR currently in 130's, asymptomatic
7  beats wide complex for the first time

## 2022-12-02 NOTE — PROGRESS NOTE ADULT - PROBLEM SELECTOR PLAN 7
-c/w atorvastatin 80 -c/w amlodpine for BP control   -holding ACE-I i/s/o [place]  -consider switching out amlodipine for other GDMT blood pressure control (ARB/ARNI)

## 2022-12-02 NOTE — CONSULT NOTE ADULT - ASSESSMENT
The patient is a 73yo male with a PMH of DM, HTN, HLD, hypoglycemic seizures who presented to the ED with dyspnea and orthopnea, admitted with concern for heart failure exacerbation.    Recommendations:     The patient is a 73yo male with a PMH of DM, HTN, HLD, hypoglycemic seizures who presented to the ED with dyspnea and orthopnea, admitted with concern for heart failure exacerbation.    1. New onset CHFrEF  2. ?New onset AF     Recommendations:   - CXR 11/30: Cardiac size is within normal limits. Trace right sided pleural effusion  - TTE 12/1: EF 39%, Calcified aortic valve. The non and the left cusp appear functionally fused by calcification. Peak transaortic valve gradient equals 9 mm Hg, estimated aortic valve area equals 2.3 sqcm. Moderate  global left ventricular systolic dysfunction.  - No prior cath   - Troponin 96 --> 91   - BNP 3355  - Creatinine 2.01 --> 1.69 --> 1.86 (pt reports he has CKD, please investigate baseline Creatinine)   - Initial EKG and telemetry: AF/Aflutter, HRs 90s-100  - Heparin drip for AC given Atrial Fibrillation    - Continue home Metoprolol Succinate 100mg   - Agree with diuresis, may begin with Lasix 20mg IV BID as pt appears to be Lasix naive and uptitrate for goal net negative 1.5-2L  - If adequate diuresis is achieved, an eventual angiogram can be planned, likely Monday  - Strict I/Os and weights   - Pt will need addition of additional GDMT with improvement in kidney function    Silvia Orr MD  Cardiology Fellow     Recommendations are preliminary until cosigned by attending

## 2022-12-02 NOTE — PROGRESS NOTE ADULT - PROBLEM SELECTOR PLAN 6
-c/w amlodpine for BP control   -holding ACE-I i/s/o [place]  -holding metoprolol to not blunt physiologic tachycardia  -consider switching out amlodipine for other GDMT blood pressure control (ARB/ARNI) Troponin peaked at 96 with elevated CKMB  -no longer needed to trend   -likely 2/2 Type II NSTEMI (demand ischemia) i/s/o ADHF

## 2022-12-02 NOTE — PROVIDER CONTACT NOTE (OTHER) - REASON
(R27.702) Vitreous degeneration, bilateral - Assesment : Examination revealed a posterior vitreous detachment. - Plan : Monitor for changes. Advised patient to call our office with decreased vision or an increase in flashes and/or floaters.
7  beats wide complex for the first time
rapid afib 130's

## 2022-12-02 NOTE — CONSULT NOTE ADULT - SUBJECTIVE AND OBJECTIVE BOX
CARDIOLOGY FELLOW CONSULT NOTE      HPI: The patient is a 73yo male with a PMH of DM, HTN, HLD, hypoglycemic seizures who presented to the ED with dyspnea and orthopnea.         PMHx:   Diabetes insipidus    Hypertension    Diabetes        PSHx:   No significant past surgical history        Allergies:  No Known Allergies        Current Medications:   amLODIPine   Tablet 10 milliGRAM(s) Oral daily  aspirin  chewable 81 milliGRAM(s) Oral daily  dextrose 5%. 1000 milliLiter(s) IV Continuous <Continuous>  dextrose 5%. 1000 milliLiter(s) IV Continuous <Continuous>  dextrose 50% Injectable 25 Gram(s) IV Push once  dextrose 50% Injectable 12.5 Gram(s) IV Push once  dextrose 50% Injectable 25 Gram(s) IV Push once  dextrose Oral Gel 15 Gram(s) Oral once PRN  glucagon  Injectable 1 milliGRAM(s) IntraMuscular once  heparin   Injectable 5000 Unit(s) SubCutaneous every 8 hours  insulin glargine Injectable (LANTUS) 17 Unit(s) SubCutaneous at bedtime  insulin lispro (ADMELOG) corrective regimen sliding scale   SubCutaneous three times a day before meals  insulin lispro Injectable (ADMELOG) 6 Unit(s) SubCutaneous three times a day before meals  lactated ringers. 1000 milliLiter(s) IV Continuous <Continuous>      FAMILY HISTORY:  FHx: myocardial infarction (Mother, Sibling)        Social History:  Smoking History:  Alcohol Use:  Drug Use:    REVIEW OF SYSTEMS:  CONSTITUTIONAL: No weakness, fevers or chills  EYES/ENT: No visual changes;  No dysphagia  NECK: No pain or stiffness  RESPIRATORY: No cough, wheezing, hemoptysis; No shortness of breath  CARDIOVASCULAR: No chest pain or palpitations; No lower extremity edema  GASTROINTESTINAL: No abdominal or epigastric pain. No nausea, vomiting, or hematemesis; No diarrhea or constipation. No melena or hematochezia.  BACK: No back pain  GENITOURINARY: No dysuria, frequency or hematuria  NEUROLOGICAL: No numbness or weakness  SKIN: No itching, burning, rashes, or lesions   All other review of systems is negative unless indicated above.    Physical Exam:  T(F): 98.2 (12-02), Max: 98.2 (12-02)  HR: 100 (12-02) (67 - 130)  BP: 119/79 (12-02) (116/73 - 133/91)  RR: 18 (12-02)  SpO2: 95% (12-02)                              11.6   4.77  )-----------( 233      ( 02 Dec 2022 05:42 )             37.4     12-02    142  |  107  |  27<H>  ----------------------------<  132<H>  4.4   |  24  |  1.86<H>    Ca    8.8      02 Dec 2022 05:42  Phos  3.5     12-02  Mg     2.2     12-02    TPro  6.0  /  Alb  3.5  /  TBili  0.4  /  DBili  x   /  AST  41<H>  /  ALT  35  /  AlkPhos  38<L>  12-02        CARDIAC MARKERS ( 02 Dec 2022 05:42 )  x     / x     / x     / 1921 U/L / x     / x      CARDIAC MARKERS ( 01 Dec 2022 06:35 )  x     / x     / x     / 3256 U/L / x     / 7.7 ng/mL  CARDIAC MARKERS ( 01 Dec 2022 01:23 )  91 ng/L / x     / x     / x     / x     / x      CARDIAC MARKERS ( 30 Nov 2022 22:14 )  96 ng/L / x     / x     / 5173 U/L / x     / x            Serum Pro-Brain Natriuretic Peptide: 3355 pg/mL (11-30 @ 22:14)               CARDIOLOGY FELLOW CONSULT NOTE      HPI: The patient is a 73yo male with a PMH of DM, HTN, HLD, hypoglycemic seizures who presented to the ED with dyspnea and orthopnea over the last month in addition to 2-3 episodes of altered behavior in the setting of hypoglycemia (one of the episodes involved the pt kicking a hole through a wall, another with a fall, unclear if these were seizures). At the time of evaluation, the patient denies any chest pain but continues to report some dyspnea, especially with exertion. He denies any cardiac history, including history of MI, heart failure or AF. Denies ever taking any anti-coagulants.         PMHx:   Diabetes insipidus    Hypertension    Diabetes        PSHx:   No significant past surgical history        Allergies:  No Known Allergies        Current Medications:   amLODIPine   Tablet 10 milliGRAM(s) Oral daily  aspirin  chewable 81 milliGRAM(s) Oral daily  dextrose 5%. 1000 milliLiter(s) IV Continuous <Continuous>  dextrose 5%. 1000 milliLiter(s) IV Continuous <Continuous>  dextrose 50% Injectable 25 Gram(s) IV Push once  dextrose 50% Injectable 12.5 Gram(s) IV Push once  dextrose 50% Injectable 25 Gram(s) IV Push once  dextrose Oral Gel 15 Gram(s) Oral once PRN  glucagon  Injectable 1 milliGRAM(s) IntraMuscular once  heparin   Injectable 5000 Unit(s) SubCutaneous every 8 hours  insulin glargine Injectable (LANTUS) 17 Unit(s) SubCutaneous at bedtime  insulin lispro (ADMELOG) corrective regimen sliding scale   SubCutaneous three times a day before meals  insulin lispro Injectable (ADMELOG) 6 Unit(s) SubCutaneous three times a day before meals  lactated ringers. 1000 milliLiter(s) IV Continuous <Continuous>      FAMILY HISTORY:  FHx: myocardial infarction (Mother, Sibling)      REVIEW OF SYSTEMS:  CONSTITUTIONAL: No weakness, fevers or chills  EYES/ENT: No visual changes;  No dysphagia  NECK: No pain or stiffness  RESPIRATORY: No cough, wheezing, hemoptysis; + shortness of breath  CARDIOVASCULAR: No chest pain or palpitations; No lower extremity edema  GASTROINTESTINAL: No abdominal or epigastric pain.  BACK: No back pain  GENITOURINARY: No dysuria, frequency or hematuria  NEUROLOGICAL: No numbness or weakness  SKIN: No itching, burning, rashes, or lesions   All other review of systems is negative unless indicated above.    Physical Exam:  T(F): 98.2 (12-02), Max: 98.2 (12-02)  HR: 100 (12-02) (67 - 130)  BP: 119/79 (12-02) (116/73 - 133/91)  RR: 18 (12-02)  SpO2: 95% (12-02)                              11.6   4.77  )-----------( 233      ( 02 Dec 2022 05:42 )             37.4     12-02    142  |  107  |  27<H>  ----------------------------<  132<H>  4.4   |  24  |  1.86<H>    Ca    8.8      02 Dec 2022 05:42  Phos  3.5     12-02  Mg     2.2     12-02    TPro  6.0  /  Alb  3.5  /  TBili  0.4  /  DBili  x   /  AST  41<H>  /  ALT  35  /  AlkPhos  38<L>  12-02        CARDIAC MARKERS ( 02 Dec 2022 05:42 )  x     / x     / x     / 1921 U/L / x     / x      CARDIAC MARKERS ( 01 Dec 2022 06:35 )  x     / x     / x     / 3256 U/L / x     / 7.7 ng/mL  CARDIAC MARKERS ( 01 Dec 2022 01:23 )  91 ng/L / x     / x     / x     / x     / x      CARDIAC MARKERS ( 30 Nov 2022 22:14 )  96 ng/L / x     / x     / 5173 U/L / x     / x            Serum Pro-Brain Natriuretic Peptide: 3355 pg/mL (11-30 @ 22:14)               CARDIOLOGY FELLOW CONSULT NOTE      HPI: The patient is a 71yo male with a PMH of DM, HTN, HLD, hypoglycemic seizures who presented to the ED with dyspnea and orthopnea over the last month in addition to 2-3 episodes of altered behavior in the setting of hypoglycemia (one of the episodes involved the pt kicking a hole through a wall, another with a fall, unclear if these were seizures). At the time of evaluation, the patient denies any chest pain but continues to report some dyspnea, especially with exertion. He denies any cardiac history, including history of MI, heart failure or AF. Denies ever taking any anti-coagulants.         PMHx:   Diabetes insipidus    Hypertension    Diabetes        PSHx:   No significant past surgical history        Allergies:  No Known Allergies        Current Medications:   amLODIPine   Tablet 10 milliGRAM(s) Oral daily  aspirin  chewable 81 milliGRAM(s) Oral daily  dextrose 5%. 1000 milliLiter(s) IV Continuous <Continuous>  dextrose 5%. 1000 milliLiter(s) IV Continuous <Continuous>  dextrose 50% Injectable 25 Gram(s) IV Push once  dextrose 50% Injectable 12.5 Gram(s) IV Push once  dextrose 50% Injectable 25 Gram(s) IV Push once  dextrose Oral Gel 15 Gram(s) Oral once PRN  glucagon  Injectable 1 milliGRAM(s) IntraMuscular once  heparin   Injectable 5000 Unit(s) SubCutaneous every 8 hours  insulin glargine Injectable (LANTUS) 17 Unit(s) SubCutaneous at bedtime  insulin lispro (ADMELOG) corrective regimen sliding scale   SubCutaneous three times a day before meals  insulin lispro Injectable (ADMELOG) 6 Unit(s) SubCutaneous three times a day before meals  lactated ringers. 1000 milliLiter(s) IV Continuous <Continuous>      FAMILY HISTORY:  FHx: myocardial infarction (Mother, Sibling)      REVIEW OF SYSTEMS:  CONSTITUTIONAL: No weakness, fevers or chills  EYES/ENT: No visual changes;  No dysphagia  NECK: No pain or stiffness  RESPIRATORY: No cough, wheezing, hemoptysis; + shortness of breath  CARDIOVASCULAR: No chest pain or palpitations; No lower extremity edema  GASTROINTESTINAL: No abdominal or epigastric pain.  BACK: No back pain  GENITOURINARY: No dysuria, frequency or hematuria  NEUROLOGICAL: No numbness or weakness  SKIN: No itching, burning, rashes, or lesions   All other review of systems is negative unless indicated above.    Physical Exam:  T(F): 98.2 (12-02), Max: 98.2 (12-02)  HR: 100 (12-02) (67 - 130)  BP: 119/79 (12-02) (116/73 - 133/91)  RR: 18 (12-02)  SpO2: 95% (12-02)      Appearance: No acute distress; well appearing  Eyes: pink conjunctiva  HEENT: Normal oral mucosa  Cardiovascular: Irregular rhythm, tachycardic, normal S1, S2, no murmurs, rubs, or gallops; 1+ edema; no JVD  Respiratory: Clear to auscultation bilaterally  Gastrointestinal: soft, non-tender, non-distended   Musculoskeletal: No clubbing; no joint deformity   Neurologic: Normal speech, no facial asymmetry  Psychiatry: AAOx3, mood & affect appropriate  Skin: No rashes, ecchymoses, or cyanosis of exposed skin                         11.6   4.77  )-----------( 233      ( 02 Dec 2022 05:42 )             37.4     12-02    142  |  107  |  27<H>  ----------------------------<  132<H>  4.4   |  24  |  1.86<H>    Ca    8.8      02 Dec 2022 05:42  Phos  3.5     12-02  Mg     2.2     12-02    TPro  6.0  /  Alb  3.5  /  TBili  0.4  /  DBili  x   /  AST  41<H>  /  ALT  35  /  AlkPhos  38<L>  12-02        CARDIAC MARKERS ( 02 Dec 2022 05:42 )  x     / x     / x     / 1921 U/L / x     / x      CARDIAC MARKERS ( 01 Dec 2022 06:35 )  x     / x     / x     / 3256 U/L / x     / 7.7 ng/mL  CARDIAC MARKERS ( 01 Dec 2022 01:23 )  91 ng/L / x     / x     / x     / x     / x      CARDIAC MARKERS ( 30 Nov 2022 22:14 )  96 ng/L / x     / x     / 5173 U/L / x     / x            Serum Pro-Brain Natriuretic Peptide: 3355 pg/mL (11-30 @ 22:14)

## 2022-12-02 NOTE — PROVIDER CONTACT NOTE (OTHER) - BACKGROUND
7  beats wide complex, admitted for SOB. pt has heparin gtt @20cc
pt admitted for seizure like activity, hypoglycemia

## 2022-12-03 DIAGNOSIS — I48.91 UNSPECIFIED ATRIAL FIBRILLATION: ICD-10-CM

## 2022-12-03 LAB
ALBUMIN SERPL ELPH-MCNC: 3.8 G/DL — SIGNIFICANT CHANGE UP (ref 3.3–5)
ALP SERPL-CCNC: 45 U/L — SIGNIFICANT CHANGE UP (ref 40–120)
ALT FLD-CCNC: 36 U/L — SIGNIFICANT CHANGE UP (ref 10–45)
ANION GAP SERPL CALC-SCNC: 12 MMOL/L — SIGNIFICANT CHANGE UP (ref 5–17)
APTT BLD: 80.4 SEC — HIGH (ref 27.5–35.5)
APTT BLD: 91.4 SEC — HIGH (ref 27.5–35.5)
AST SERPL-CCNC: 38 U/L — SIGNIFICANT CHANGE UP (ref 10–40)
BILIRUB SERPL-MCNC: 0.3 MG/DL — SIGNIFICANT CHANGE UP (ref 0.2–1.2)
BUN SERPL-MCNC: 33 MG/DL — HIGH (ref 7–23)
CALCIUM SERPL-MCNC: 9.3 MG/DL — SIGNIFICANT CHANGE UP (ref 8.4–10.5)
CHLORIDE SERPL-SCNC: 106 MMOL/L — SIGNIFICANT CHANGE UP (ref 96–108)
CK SERPL-CCNC: 1277 U/L — HIGH (ref 30–200)
CO2 SERPL-SCNC: 23 MMOL/L — SIGNIFICANT CHANGE UP (ref 22–31)
CREAT SERPL-MCNC: 2.09 MG/DL — HIGH (ref 0.5–1.3)
EGFR: 33 ML/MIN/1.73M2 — LOW
GLUCOSE BLDC GLUCOMTR-MCNC: 113 MG/DL — HIGH (ref 70–99)
GLUCOSE BLDC GLUCOMTR-MCNC: 119 MG/DL — HIGH (ref 70–99)
GLUCOSE BLDC GLUCOMTR-MCNC: 133 MG/DL — HIGH (ref 70–99)
GLUCOSE BLDC GLUCOMTR-MCNC: 262 MG/DL — HIGH (ref 70–99)
GLUCOSE SERPL-MCNC: 121 MG/DL — HIGH (ref 70–99)
HCT VFR BLD CALC: 36.5 % — LOW (ref 39–50)
HCT VFR BLD CALC: 39.4 % — SIGNIFICANT CHANGE UP (ref 39–50)
HGB BLD-MCNC: 11.5 G/DL — LOW (ref 13–17)
HGB BLD-MCNC: 12.4 G/DL — LOW (ref 13–17)
MAGNESIUM SERPL-MCNC: 2.2 MG/DL — SIGNIFICANT CHANGE UP (ref 1.6–2.6)
MCHC RBC-ENTMCNC: 29 PG — SIGNIFICANT CHANGE UP (ref 27–34)
MCHC RBC-ENTMCNC: 29.3 PG — SIGNIFICANT CHANGE UP (ref 27–34)
MCHC RBC-ENTMCNC: 31.5 GM/DL — LOW (ref 32–36)
MCHC RBC-ENTMCNC: 31.5 GM/DL — LOW (ref 32–36)
MCV RBC AUTO: 92.3 FL — SIGNIFICANT CHANGE UP (ref 80–100)
MCV RBC AUTO: 92.9 FL — SIGNIFICANT CHANGE UP (ref 80–100)
NRBC # BLD: 0 /100 WBCS — SIGNIFICANT CHANGE UP (ref 0–0)
NRBC # BLD: 0 /100 WBCS — SIGNIFICANT CHANGE UP (ref 0–0)
PHOSPHATE SERPL-MCNC: 4.7 MG/DL — HIGH (ref 2.5–4.5)
PLATELET # BLD AUTO: 228 K/UL — SIGNIFICANT CHANGE UP (ref 150–400)
PLATELET # BLD AUTO: 250 K/UL — SIGNIFICANT CHANGE UP (ref 150–400)
POTASSIUM SERPL-MCNC: 4.6 MMOL/L — SIGNIFICANT CHANGE UP (ref 3.5–5.3)
POTASSIUM SERPL-SCNC: 4.6 MMOL/L — SIGNIFICANT CHANGE UP (ref 3.5–5.3)
PROT SERPL-MCNC: 6.5 G/DL — SIGNIFICANT CHANGE UP (ref 6–8.3)
RBC # BLD: 3.93 M/UL — LOW (ref 4.2–5.8)
RBC # BLD: 4.27 M/UL — SIGNIFICANT CHANGE UP (ref 4.2–5.8)
RBC # FLD: 13.7 % — SIGNIFICANT CHANGE UP (ref 10.3–14.5)
RBC # FLD: 13.8 % — SIGNIFICANT CHANGE UP (ref 10.3–14.5)
SODIUM SERPL-SCNC: 141 MMOL/L — SIGNIFICANT CHANGE UP (ref 135–145)
WBC # BLD: 4.84 K/UL — SIGNIFICANT CHANGE UP (ref 3.8–10.5)
WBC # BLD: 5.05 K/UL — SIGNIFICANT CHANGE UP (ref 3.8–10.5)
WBC # FLD AUTO: 4.84 K/UL — SIGNIFICANT CHANGE UP (ref 3.8–10.5)
WBC # FLD AUTO: 5.05 K/UL — SIGNIFICANT CHANGE UP (ref 3.8–10.5)

## 2022-12-03 PROCEDURE — 99233 SBSQ HOSP IP/OBS HIGH 50: CPT

## 2022-12-03 PROCEDURE — 99233 SBSQ HOSP IP/OBS HIGH 50: CPT | Mod: GC

## 2022-12-03 RX ADMIN — Medication 20 MILLIGRAM(S): at 14:52

## 2022-12-03 RX ADMIN — INSULIN GLARGINE 17 UNIT(S): 100 INJECTION, SOLUTION SUBCUTANEOUS at 22:06

## 2022-12-03 RX ADMIN — Medication 100 MILLIGRAM(S): at 05:33

## 2022-12-03 RX ADMIN — HEPARIN SODIUM 2000 UNIT(S)/HR: 5000 INJECTION INTRAVENOUS; SUBCUTANEOUS at 00:42

## 2022-12-03 RX ADMIN — Medication 6 UNIT(S): at 09:05

## 2022-12-03 RX ADMIN — Medication 6 UNIT(S): at 17:33

## 2022-12-03 RX ADMIN — Medication 81 MILLIGRAM(S): at 12:21

## 2022-12-03 RX ADMIN — HEPARIN SODIUM 2000 UNIT(S)/HR: 5000 INJECTION INTRAVENOUS; SUBCUTANEOUS at 05:34

## 2022-12-03 RX ADMIN — HEPARIN SODIUM 2000 UNIT(S)/HR: 5000 INJECTION INTRAVENOUS; SUBCUTANEOUS at 09:05

## 2022-12-03 RX ADMIN — Medication 20 MILLIGRAM(S): at 05:34

## 2022-12-03 RX ADMIN — Medication 6 UNIT(S): at 12:20

## 2022-12-03 RX ADMIN — AMLODIPINE BESYLATE 10 MILLIGRAM(S): 2.5 TABLET ORAL at 05:33

## 2022-12-03 RX ADMIN — Medication 3: at 17:32

## 2022-12-03 NOTE — PROGRESS NOTE ADULT - ATTENDING COMMENTS
Mr. Gutierrez is a 72-year-old man with multiple CAD risk factors who presented to the hospital with HFrEF.    TTE images reviewed. Increase LV wall thickness/morphology of the LV raises suspicion for TTR CA, particular in  man presenting with heart failure.      Recommend Tc-[id number] scan for further evaluation in addition to above.    35 minutes spent on total patient encounter. More than 50% of the encounter was spent counseling and/or coordination care. The necessity of the time spent on this encounter was due to: time spent evaluating the patient as well as time spent reviewing labs, imaging, and discussing the case with a multidisciplinary team. Mr. Gutierrez is a 72-year-old man with multiple CAD risk factors who presented to the hospital with HFrEF.    TTE images reviewed. Increased LV wall thickness/morphology of the LV raises suspicion for TTR CA, particularly in  man presenting with heart failure.      Recommend Tc-99m-PYP scan and serum free light chains for further evaluation in addition to above.    35 minutes spent on total patient encounter. More than 50% of the encounter was spent counseling and/or coordination care. The necessity of the time spent on this encounter was due to: time spent evaluating the patient as well as time spent reviewing labs, imaging, and discussing the case with a multidisciplinary team.

## 2022-12-03 NOTE — PROGRESS NOTE ADULT - SUBJECTIVE AND OBJECTIVE BOX
PROGRESS NOTE:   Authored by Dr. Jesse Stallings MD ,    Patient is a 72y old  Male who presents with a chief complaint of Hypoglycemia Episodes and Orthopnea with exertional SOB (03 Dec 2022 06:36)      SUBJECTIVE / OVERNIGHT EVENTS: No events ON. Patient this AM has no complaints    ADDITIONAL REVIEW OF SYSTEMS: Denies CP, SOB or other ROS    MEDICATIONS  (STANDING):  amLODIPine   Tablet 10 milliGRAM(s) Oral daily  aspirin  chewable 81 milliGRAM(s) Oral daily  dextrose 5%. 1000 milliLiter(s) (100 mL/Hr) IV Continuous <Continuous>  dextrose 5%. 1000 milliLiter(s) (50 mL/Hr) IV Continuous <Continuous>  dextrose 50% Injectable 25 Gram(s) IV Push once  dextrose 50% Injectable 12.5 Gram(s) IV Push once  dextrose 50% Injectable 25 Gram(s) IV Push once  furosemide   Injectable 20 milliGRAM(s) IV Push two times a day  glucagon  Injectable 1 milliGRAM(s) IntraMuscular once  heparin  Infusion.  Unit(s)/Hr (20 mL/Hr) IV Continuous <Continuous>  insulin glargine Injectable (LANTUS) 17 Unit(s) SubCutaneous at bedtime  insulin lispro (ADMELOG) corrective regimen sliding scale   SubCutaneous three times a day before meals  insulin lispro Injectable (ADMELOG) 6 Unit(s) SubCutaneous three times a day before meals  lactated ringers. 1000 milliLiter(s) (100 mL/Hr) IV Continuous <Continuous>  metoprolol succinate  milliGRAM(s) Oral daily    MEDICATIONS  (PRN):  dextrose Oral Gel 15 Gram(s) Oral once PRN Blood Glucose LESS THAN 70 milliGRAM(s)/deciliter      CAPILLARY BLOOD GLUCOSE      POCT Blood Glucose.: 119 mg/dL (03 Dec 2022 08:32)  POCT Blood Glucose.: 187 mg/dL (02 Dec 2022 21:36)  POCT Blood Glucose.: 155 mg/dL (02 Dec 2022 16:52)  POCT Blood Glucose.: 135 mg/dL (02 Dec 2022 11:16)    I&O's Summary    02 Dec 2022 07:01  -  03 Dec 2022 07:00  --------------------------------------------------------  IN: 425 mL / OUT: 1330 mL / NET: -905 mL    03 Dec 2022 07:01  -  03 Dec 2022 10:39  --------------------------------------------------------  IN: 220 mL / OUT: 450 mL / NET: -230 mL        PHYSICAL EXAM:  Vital Signs Last 24 Hrs  T(C): 36.7 (03 Dec 2022 03:45), Max: 36.8 (02 Dec 2022 11:28)  T(F): 98 (03 Dec 2022 03:45), Max: 98.3 (02 Dec 2022 22:02)  HR: 72 (03 Dec 2022 03:45) (59 - 123)  BP: 126/63 (03 Dec 2022 03:45) (92/69 - 142/74)  BP(mean): --  RR: 18 (03 Dec 2022 03:45) (18 - 18)  SpO2: 97% (03 Dec 2022 03:45) (95% - 97%)    Parameters below as of 03 Dec 2022 03:45  Patient On (Oxygen Delivery Method): room air        GENERAL: No acute distress, well-developed  HEAD:  Atraumatic, Normocephalic  EYES: EOMI, PERRLA, conjunctiva and sclera clear  NECK: Supple, no lymphadenopathy, no JVD  CHEST/LUNG: CTAB; No wheezes, rales, or rhonchi  HEART: Irregularly irregular  ABDOMEN: Soft, non-tender, non-distended; normal bowel sounds, no organomegaly  EXTREMITIES:  1-2 + pitting edema b/l on lower ext  NEUROLOGY: A&O x 3, no focal deficits  SKIN: No rashes or lesions    LABS:                        12.4   5.05  )-----------( 250      ( 03 Dec 2022 07:28 )             39.4     12-03    141  |  106  |  33<H>  ----------------------------<  121<H>  4.6   |  23  |  2.09<H>    Ca    9.3      03 Dec 2022 07:07  Phos  4.7     12-03  Mg     2.2     12-03    TPro  6.5  /  Alb  3.8  /  TBili  0.3  /  DBili  x   /  AST  38  /  ALT  36  /  AlkPhos  45  12-03    PT/INR - ( 02 Dec 2022 17:03 )   PT: 12.6 sec;   INR: 1.09 ratio         PTT - ( 03 Dec 2022 07:20 )  PTT:91.4 sec  CARDIAC MARKERS ( 02 Dec 2022 05:42 )  x     / x     / 1921 U/L / x     / x                RADIOLOGY & ADDITIONAL TESTS:  Results Reviewed:   Imaging Personally Reviewed:  Electrocardiogram Personally Reviewed:    COORDINATION OF CARE:  Care Discussed with Consultants/Other Providers [Y/N]:  Prior or Outpatient Records Reviewed [Y/N]:

## 2022-12-03 NOTE — PROGRESS NOTE ADULT - SUBJECTIVE AND OBJECTIVE BOX
Ade Mcnulty MD  Cardiology Fellow  [phone]  All Cardiology service information can be found 24/7 on [email or web address], password: [email or web address]    Patient seen and examined at bedside.    Overnight Events:   NAEON     Review Of Systems: No chest pain, shortness of breath, or palpitations            Current Meds:  amLODIPine   Tablet 10 milliGRAM(s) Oral daily  aspirin  chewable 81 milliGRAM(s) Oral daily  dextrose 5%. 1000 milliLiter(s) IV Continuous <Continuous>  dextrose 5%. 1000 milliLiter(s) IV Continuous <Continuous>  dextrose 50% Injectable 25 Gram(s) IV Push once  dextrose 50% Injectable 12.5 Gram(s) IV Push once  dextrose 50% Injectable 25 Gram(s) IV Push once  dextrose Oral Gel 15 Gram(s) Oral once PRN  furosemide   Injectable 20 milliGRAM(s) IV Push two times a day  glucagon  Injectable 1 milliGRAM(s) IntraMuscular once  heparin  Infusion.  Unit(s)/Hr IV Continuous <Continuous>  insulin glargine Injectable (LANTUS) 17 Unit(s) SubCutaneous at bedtime  insulin lispro (ADMELOG) corrective regimen sliding scale   SubCutaneous three times a day before meals  insulin lispro Injectable (ADMELOG) 6 Unit(s) SubCutaneous three times a day before meals  lactated ringers. 1000 milliLiter(s) IV Continuous <Continuous>  metoprolol succinate  milliGRAM(s) Oral daily      Vitals:  T(F): 98 (12-03), Max: 98.3 (12-02)  HR: 72 (12-03) (59 - 130)  BP: 126/63 (12-03) (92/69 - 142/74)  RR: 18 (12-03)  SpO2: 97% (12-03)  I&O's Summary    02 Dec 2022 07:01  -  03 Dec 2022 06:37  --------------------------------------------------------  IN: 425 mL / OUT: 1330 mL / NET: -905 mL        Physical Exam:  Appearance: No acute distress; well appearing  Eyes: pink conjunctiva  HEENT: Normal oral mucosa  Cardiovascular: Irregular rhythm, tachycardic, normal S1, S2, no murmurs, rubs, or gallops; 1+ edema; no JVD  Respiratory: Clear to auscultation bilaterally  Gastrointestinal: soft, non-tender, non-distended   Musculoskeletal: No clubbing; no joint deformity   Neurologic: Normal speech, no facial asymmetry  Psychiatry: AAOx3, mood & affect appropriate  Skin: No rashes, ecchymoses, or cyanosis of exposed skin                           11.5   4.84  )-----------( 228      ( 03 Dec 2022 00:15 )             36.5     12-02    142  |  107  |  27<H>  ----------------------------<  132<H>  4.4   |  24  |  1.86<H>    Ca    8.8      02 Dec 2022 05:42  Phos  3.5     12-02  Mg     2.2     12-02    TPro  6.0  /  Alb  3.5  /  TBili  0.4  /  DBili  x   /  AST  41<H>  /  ALT  35  /  AlkPhos  38<L>  12-02    PT/INR - ( 02 Dec 2022 17:03 )   PT: 12.6 sec;   INR: 1.09 ratio         PTT - ( 03 Dec 2022 00:15 )  PTT:80.4 sec  CARDIAC MARKERS ( 02 Dec 2022 05:42 )  x     / x     / x     / 1921 U/L / x     / x      CARDIAC MARKERS ( 01 Dec 2022 06:35 )  x     / x     / x     / 3256 U/L / x     / 7.7 ng/mL  CARDIAC MARKERS ( 01 Dec 2022 01:23 )  91 ng/L / x     / x     / x     / x     / x      CARDIAC MARKERS ( 30 Nov 2022 22:14 )  96 ng/L / x     / x     / 5173 U/L / x     / x          Serum Pro-Brain Natriuretic Peptide: 3355 pg/mL (11-30 @ 22:14)          New ECG(s): Personally reviewed    Echo:    Stress Testing:     Cath:    Imaging:    Interpretation of Telemetry:   Ade Mcnulty MD  Cardiology Fellow  [phone]  All Cardiology service information can be found 24/7 on [email or web address], password: [email or web address]    Patient seen and examined at bedside.    Overnight Events:   NAEON     Review Of Systems: No chest pain, shortness of breath, or palpitations            Current Meds:  amLODIPine   Tablet 10 milliGRAM(s) Oral daily  aspirin  chewable 81 milliGRAM(s) Oral daily  dextrose 5%. 1000 milliLiter(s) IV Continuous <Continuous>  dextrose 5%. 1000 milliLiter(s) IV Continuous <Continuous>  dextrose 50% Injectable 25 Gram(s) IV Push once  dextrose 50% Injectable 12.5 Gram(s) IV Push once  dextrose 50% Injectable 25 Gram(s) IV Push once  dextrose Oral Gel 15 Gram(s) Oral once PRN  furosemide   Injectable 20 milliGRAM(s) IV Push two times a day  glucagon  Injectable 1 milliGRAM(s) IntraMuscular once  heparin  Infusion.  Unit(s)/Hr IV Continuous <Continuous>  insulin glargine Injectable (LANTUS) 17 Unit(s) SubCutaneous at bedtime  insulin lispro (ADMELOG) corrective regimen sliding scale   SubCutaneous three times a day before meals  insulin lispro Injectable (ADMELOG) 6 Unit(s) SubCutaneous three times a day before meals  lactated ringers. 1000 milliLiter(s) IV Continuous <Continuous>  metoprolol succinate  milliGRAM(s) Oral daily      Vitals:  T(F): 98 (12-03), Max: 98.3 (12-02)  HR: 72 (12-03) (59 - 130)  BP: 126/63 (12-03) (92/69 - 142/74)  RR: 18 (12-03)  SpO2: 97% (12-03)  I&O's Summary    02 Dec 2022 07:01  -  03 Dec 2022 06:37  --------------------------------------------------------  IN: 425 mL / OUT: 1330 mL / NET: -905 mL        Physical Exam:  Appearance: No acute distress; well appearing  Eyes: pink conjunctiva  HEENT: Normal oral mucosa  Cardiovascular: Irregular rhythm, tachycardic, normal S1, S2, no murmurs, rubs, or gallops; + edema, unable to assess JVD   Respiratory: Clear to auscultation bilaterally  Gastrointestinal: soft, non-tender, non-distended   Musculoskeletal: No clubbing; no joint deformity   Neurologic: Normal speech, no facial asymmetry  Psychiatry: AAOx3, mood & affect appropriate  Skin: No rashes, ecchymoses, or cyanosis of exposed skin                           11.5   4.84  )-----------( 228      ( 03 Dec 2022 00:15 )             36.5     12-02    142  |  107  |  27<H>  ----------------------------<  132<H>  4.4   |  24  |  1.86<H>    Ca    8.8      02 Dec 2022 05:42  Phos  3.5     12-02  Mg     2.2     12-02    TPro  6.0  /  Alb  3.5  /  TBili  0.4  /  DBili  x   /  AST  41<H>  /  ALT  35  /  AlkPhos  38<L>  12-02    PT/INR - ( 02 Dec 2022 17:03 )   PT: 12.6 sec;   INR: 1.09 ratio         PTT - ( 03 Dec 2022 00:15 )  PTT:80.4 sec  CARDIAC MARKERS ( 02 Dec 2022 05:42 )  x     / x     / x     / 1921 U/L / x     / x      CARDIAC MARKERS ( 01 Dec 2022 06:35 )  x     / x     / x     / 3256 U/L / x     / 7.7 ng/mL  CARDIAC MARKERS ( 01 Dec 2022 01:23 )  91 ng/L / x     / x     / x     / x     / x      CARDIAC MARKERS ( 30 Nov 2022 22:14 )  96 ng/L / x     / x     / 5173 U/L / x     / x          Serum Pro-Brain Natriuretic Peptide: 3355 pg/mL (11-30 @ 22:14)          New ECG(s): Personally reviewed    Echo:    Stress Testing:     Cath:    Imaging:    Interpretation of Telemetry:   Ade Mcnulty MD  Cardiology Fellow  [phone]  All Cardiology service information can be found 24/7 on [email or web address], password: [email or web address]    Patient seen and examined at bedside.    Overnight Events:   NAEON     Review Of Systems: No chest pain, shortness of breath, or palpitations            Current Meds:  amLODIPine   Tablet 10 milliGRAM(s) Oral daily  aspirin  chewable 81 milliGRAM(s) Oral daily  dextrose 5%. 1000 milliLiter(s) IV Continuous <Continuous>  dextrose 5%. 1000 milliLiter(s) IV Continuous <Continuous>  dextrose 50% Injectable 25 Gram(s) IV Push once  dextrose 50% Injectable 12.5 Gram(s) IV Push once  dextrose 50% Injectable 25 Gram(s) IV Push once  dextrose Oral Gel 15 Gram(s) Oral once PRN  furosemide   Injectable 20 milliGRAM(s) IV Push two times a day  glucagon  Injectable 1 milliGRAM(s) IntraMuscular once  heparin  Infusion.  Unit(s)/Hr IV Continuous <Continuous>  insulin glargine Injectable (LANTUS) 17 Unit(s) SubCutaneous at bedtime  insulin lispro (ADMELOG) corrective regimen sliding scale   SubCutaneous three times a day before meals  insulin lispro Injectable (ADMELOG) 6 Unit(s) SubCutaneous three times a day before meals  lactated ringers. 1000 milliLiter(s) IV Continuous <Continuous>  metoprolol succinate  milliGRAM(s) Oral daily      Vitals:  T(F): 98 (12-03), Max: 98.3 (12-02)  HR: 72 (12-03) (59 - 130)  BP: 126/63 (12-03) (92/69 - 142/74)  RR: 18 (12-03)  SpO2: 97% (12-03)  I&O's Summary    02 Dec 2022 07:01  -  03 Dec 2022 06:37  --------------------------------------------------------  IN: 425 mL / OUT: 1330 mL / NET: -905 mL        Physical Exam:  Appearance: No acute distress; well appearing  Eyes: pink conjunctiva  HEENT: Normal oral mucosa  Cardiovascular: Irregular rhythm, tachycardic, normal S1, S2, no murmurs, rubs, or gallops; + edema, unable to assess JVD   Respiratory: Clear to auscultation bilaterally  Gastrointestinal: soft, non-tender, non-distended   Musculoskeletal: No clubbing; no joint deformity   Neurologic: Normal speech, no facial asymmetry  Psychiatry: AAOx3, mood & affect appropriate  Skin: No rashes, ecchymoses, or cyanosis of exposed skin                           11.5   4.84  )-----------( 228      ( 03 Dec 2022 00:15 )             36.5     12-02    142  |  107  |  27<H>  ----------------------------<  132<H>  4.4   |  24  |  1.86<H>    Ca    8.8      02 Dec 2022 05:42  Phos  3.5     12-02  Mg     2.2     12-02    TPro  6.0  /  Alb  3.5  /  TBili  0.4  /  DBili  x   /  AST  41<H>  /  ALT  35  /  AlkPhos  38<L>  12-02    PT/INR - ( 02 Dec 2022 17:03 )   PT: 12.6 sec;   INR: 1.09 ratio         PTT - ( 03 Dec 2022 00:15 )  PTT:80.4 sec  CARDIAC MARKERS ( 02 Dec 2022 05:42 )  x     / x     / x     / 1921 U/L / x     / x      CARDIAC MARKERS ( 01 Dec 2022 06:35 )  x     / x     / x     / 3256 U/L / x     / 7.7 ng/mL  CARDIAC MARKERS ( 01 Dec 2022 01:23 )  91 ng/L / x     / x     / x     / x     / x      CARDIAC MARKERS ( 30 Nov 2022 22:14 )  96 ng/L / x     / x     / 5173 U/L / x     / x          Serum Pro-Brain Natriuretic Peptide: 3355 pg/mL (11-30 @ 22:14)          New ECG(s): Personally reviewed      Interpretation of Telemetry:   Ade Mcnulty MD  Cardiology Fellow  [phone]  All Cardiology service information can be found 24/7 on [email or web address], password: [email or web address]    Patient seen and examined at bedside.    Overnight Events:   NAEON     Review Of Systems: No chest pain, shortness of breath, or palpitations            Current Meds:  amLODIPine   Tablet 10 milliGRAM(s) Oral daily  aspirin  chewable 81 milliGRAM(s) Oral daily  dextrose 5%. 1000 milliLiter(s) IV Continuous <Continuous>  dextrose 5%. 1000 milliLiter(s) IV Continuous <Continuous>  dextrose 50% Injectable 25 Gram(s) IV Push once  dextrose 50% Injectable 12.5 Gram(s) IV Push once  dextrose 50% Injectable 25 Gram(s) IV Push once  dextrose Oral Gel 15 Gram(s) Oral once PRN  furosemide   Injectable 20 milliGRAM(s) IV Push two times a day  glucagon  Injectable 1 milliGRAM(s) IntraMuscular once  heparin  Infusion.  Unit(s)/Hr IV Continuous <Continuous>  insulin glargine Injectable (LANTUS) 17 Unit(s) SubCutaneous at bedtime  insulin lispro (ADMELOG) corrective regimen sliding scale   SubCutaneous three times a day before meals  insulin lispro Injectable (ADMELOG) 6 Unit(s) SubCutaneous three times a day before meals  lactated ringers. 1000 milliLiter(s) IV Continuous <Continuous>  metoprolol succinate  milliGRAM(s) Oral daily      Vitals:  T(F): 98 (12-03), Max: 98.3 (12-02)  HR: 72 (12-03) (59 - 130)  BP: 126/63 (12-03) (92/69 - 142/74)  RR: 18 (12-03)  SpO2: 97% (12-03)  I&O's Summary    02 Dec 2022 07:01  -  03 Dec 2022 06:37  --------------------------------------------------------  IN: 425 mL / OUT: 1330 mL / NET: -905 mL        Physical Exam:  Appearance: No acute distress; well appearing  Eyes: pink conjunctiva  HEENT: Normal oral mucosa  Cardiovascular: Irregular rhythm, tachycardic, normal S1, S2, no murmurs, rubs, or gallops; + edema, unable to assess JVD   Respiratory: Clear to auscultation bilaterally  Gastrointestinal: soft, non-tender, non-distended   Musculoskeletal: No clubbing; no joint deformity   Neurologic: Normal speech, no facial asymmetry  Psychiatry: AAOx3, mood & affect appropriate  Skin: No rashes, ecchymoses, or cyanosis of exposed skin                           11.5   4.84  )-----------( 228      ( 03 Dec 2022 00:15 )             36.5     12-02    142  |  107  |  27<H>  ----------------------------<  132<H>  4.4   |  24  |  1.86<H>    Ca    8.8      02 Dec 2022 05:42  Phos  3.5     12-02  Mg     2.2     12-02    TPro  6.0  /  Alb  3.5  /  TBili  0.4  /  DBili  x   /  AST  41<H>  /  ALT  35  /  AlkPhos  38<L>  12-02    PT/INR - ( 02 Dec 2022 17:03 )   PT: 12.6 sec;   INR: 1.09 ratio         PTT - ( 03 Dec 2022 00:15 )  PTT:80.4 sec  CARDIAC MARKERS ( 02 Dec 2022 05:42 )  x     / x     / x     / 1921 U/L / x     / x      CARDIAC MARKERS ( 01 Dec 2022 06:35 )  x     / x     / x     / 3256 U/L / x     / 7.7 ng/mL  CARDIAC MARKERS ( 01 Dec 2022 01:23 )  91 ng/L / x     / x     / x     / x     / x      CARDIAC MARKERS ( 30 Nov 2022 22:14 )  96 ng/L / x     / x     / 5173 U/L / x     / x          Serum Pro-Brain Natriuretic Peptide: 3355 pg/mL (11-30 @ 22:14)          New ECG(s): Personally reviewed      Interpretation of Telemetry:

## 2022-12-03 NOTE — PROGRESS NOTE ADULT - ATTENDING COMMENTS
72M with T2DM on insulin, HTN, HLD here with possible seizures 2/2 hypoglycemia likely from misunderstanding of insulin regimen, complicated by rhabdomyolysis and with worsening exercise tolerance secondary to possible acute heart failure.     EKG personally reviewed aflutter w/ variable block    # ADHF  Worsening pitting edema w/ worsening exercise tolerance and paroxysmal nocturnal dyspnea suggestive of heart failure, with elevated BMP  - echo with LVSD, EF 39%. already on ACE (held for tati), can transition to entresto when TATI resolves and after cath  - strict I&Os  - daily standing weights  - cardiology consult - eventual ischemic eval when euvolemic  - c/w diuresis per cards  - dyspnea symptomatically improving     # Aflutter  - heparin gtt (CHADsVASC at least 4)  - can be discharged on doac if insurance covers  - cards consult    # Rhabdo  Mildly elevated CK i/s/o possible seizures 2/2 hypoglycemia  - Cr improved with 500cc in ED + gentle hydration 12/1  - trend CK til normalization    # TATI  Elevated Cr i/s/o rhabdo, need to find out baseline (baseline Cr 2 per pcp)  - Avoid nephrotoxic agents  - Trend Cr daily    # T2DM  On basal/bolus 80BID/45qac per endocrinologist office. However, pt is confused about his exact regimen.  - weight base dose for now given he may not be taking his insulin correctly and he has been hypoglycemic at home  - monitor FS qac qhs   - basal/bolus 17/6 with adequate control  - counseled on carbohydrate control  - consult dietician for education on consistent carb diets at home  - a1c 9.8, f/u lipid panel    Rest of plan as above. Discussed with HS.

## 2022-12-03 NOTE — PROGRESS NOTE ADULT - PROBLEM SELECTOR PLAN 2
New A fib, rate mostly contollred  CHADSVASC score of 4  -Started Hep gtt as per Cards  -C/w BB for rate control

## 2022-12-03 NOTE — PROGRESS NOTE ADULT - PROBLEM SELECTOR PLAN 7
-c/w amlodpine for BP control   -holding ACE-I i/s/o [place]  -consider switching out amlodipine for other GDMT blood pressure control (ARB/ARNI)

## 2022-12-03 NOTE — PROGRESS NOTE ADULT - PROBLEM SELECTOR PLAN 4
Per endocrinologist, patient on 80 BID levemir and 45 TID novolog with meals  Unclear compliance per patient history; concern for over medication resulting in hypoglycemic episodes  -started with weight base dosing for insulin (0.3 mg/kg/day) (17 units at bedtime, 6 units premeal)  -low dose ISS (only required 1 additional unit yesterday)  -adjust Insulin as needed based on sliding scale requirements

## 2022-12-03 NOTE — PROGRESS NOTE ADULT - PROBLEM SELECTOR PLAN 3
Elevated CK and + Blood in urine c/f rhabdomyolysis i/s/o hypoglycemic seizures  -Cr 2.02 on presentation; improved with fluids; slightly increased creatinine today (1.6 --> 1.8) likely 2/2 cardiorenal now  -CK downtrending  -ctm cr  -consider repeat UA later this admission to ensure no RBCs

## 2022-12-03 NOTE — PROGRESS NOTE ADULT - PROBLEM SELECTOR PLAN 1
Orthopnea, elevated proBNP, pulmonary edema suggestive of ADHF  -TTE showed HFrEF (EF 39%)  -on room air  -C/w Lasix 20 BID  -C/w Toprol   -holding ACE-I i/s/o TATI --> consider switching to ARB/ARNI this admission  -cardiology consulted for evaluation of new onset HF  -Angiogram on Monday by Cards

## 2022-12-03 NOTE — PROGRESS NOTE ADULT - ASSESSMENT
The patient is a 73yo male with a PMH of DM, HTN, HLD, hypoglycemic seizures who presented to the ED with dyspnea and orthopnea, admitted with concern for heart failure exacerbation.    1. New onset CHFrEF  2. ?New onset AF     Recommendations:   - CXR 11/30: Cardiac size is within normal limits. Trace right sided pleural effusion  - TTE 12/1: EF 39%, Calcified aortic valve. The non and the left cusp appear functionally fused by calcification. Peak transaortic valve gradient equals 9 mm Hg, estimated aortic valve area equals 2.3 sqcm. Moderate  global left ventricular systolic dysfunction.  - No prior cath   - Troponin 96 --> 91   - BNP 3355  - Creatinine 2.01 --> 1.69 --> 1.86 (pt reports he has CKD, please investigate baseline Creatinine)   - Initial EKG and telemetry: AF/Aflutter, HRs 90s-100  - Heparin drip for AC given Atrial Fibrillation    - Continue home Metoprolol Succinate 100mg   - Agree with diuresis, may begin with Lasix 20mg IV BID as pt appears to be Lasix naive and uptitrate for goal net negative 1.5-2L  - If adequate diuresis is achieved, an eventual angiogram can be planned, likely Monday  - Strict I/Os and weights   - Pt will need addition of additional GDMT with improvement in kidney function  The patient is a 71yo male with a PMH of DM, HTN, HLD, hypoglycemic seizures who presented to the ED with dyspnea and orthopnea, admitted with concern for heart failure exacerbation.    1. New onset CHFrEF  2. ?New onset AF     Recommendations:   - CXR 11/30: Cardiac size is within normal limits. Trace right sided pleural effusion  - TTE 12/1: EF 39%, Calcified aortic valve. The non and the left cusp appear functionally fused by calcification. Peak transaortic valve gradient equals 9 mm Hg, estimated aortic valve area equals 2.3 sqcm. Moderate  global left ventricular systolic dysfunction.  - No prior cath   - Troponin 96 --> 91   - BNP 3355  - Creatinine 2.01 --> 1.69 --> 1.86 (pt reports he has CKD, please investigate baseline Creatinine)   - Initial EKG and telemetry: AF/Aflutter, HRs 90s-100  - Heparin drip for AC given Atrial Fibrillation    - Continue home Metoprolol Succinate 100mg   - Agree with diuresis, may begin with Lasix 20mg IV BID as pt appears to be Lasix naive and uptitrate for goal net negative 1.5-2L.  - If adequate diuresis is achieved, an eventual angiogram can be planned, likely Monday. (Keep NPO sunday night, confirm repeat COVID test  sunday)   - Strict I/Os and weights   - Pt will need addition of additional GDMT with improvement in kidney function

## 2022-12-03 NOTE — PROGRESS NOTE ADULT - ASSESSMENT
73 y/o M PMH IDDM, HTN, HLD p/w SOB and night time "seizures" with associated hypoglycemia. SOB with associated elevated proBNP, orthopnea, and pleural effusion found to have ADHF (EF 39%) and poor insulin use at home.

## 2022-12-04 ENCOUNTER — TRANSCRIPTION ENCOUNTER (OUTPATIENT)
Age: 72
End: 2022-12-04

## 2022-12-04 LAB
ANION GAP SERPL CALC-SCNC: 13 MMOL/L — SIGNIFICANT CHANGE UP (ref 5–17)
APTT BLD: 97.5 SEC — HIGH (ref 27.5–35.5)
BUN SERPL-MCNC: 33 MG/DL — HIGH (ref 7–23)
CALCIUM SERPL-MCNC: 9.3 MG/DL — SIGNIFICANT CHANGE UP (ref 8.4–10.5)
CHLORIDE SERPL-SCNC: 105 MMOL/L — SIGNIFICANT CHANGE UP (ref 96–108)
CK SERPL-CCNC: 781 U/L — HIGH (ref 30–200)
CO2 SERPL-SCNC: 23 MMOL/L — SIGNIFICANT CHANGE UP (ref 22–31)
CREAT SERPL-MCNC: 2.07 MG/DL — HIGH (ref 0.5–1.3)
EGFR: 34 ML/MIN/1.73M2 — LOW
GLUCOSE BLDC GLUCOMTR-MCNC: 111 MG/DL — HIGH (ref 70–99)
GLUCOSE BLDC GLUCOMTR-MCNC: 139 MG/DL — HIGH (ref 70–99)
GLUCOSE BLDC GLUCOMTR-MCNC: 155 MG/DL — HIGH (ref 70–99)
GLUCOSE BLDC GLUCOMTR-MCNC: 163 MG/DL — HIGH (ref 70–99)
GLUCOSE SERPL-MCNC: 120 MG/DL — HIGH (ref 70–99)
HCT VFR BLD CALC: 38.6 % — LOW (ref 39–50)
HGB BLD-MCNC: 12.3 G/DL — LOW (ref 13–17)
MAGNESIUM SERPL-MCNC: 2.1 MG/DL — SIGNIFICANT CHANGE UP (ref 1.6–2.6)
MCHC RBC-ENTMCNC: 29.6 PG — SIGNIFICANT CHANGE UP (ref 27–34)
MCHC RBC-ENTMCNC: 31.9 GM/DL — LOW (ref 32–36)
MCV RBC AUTO: 93 FL — SIGNIFICANT CHANGE UP (ref 80–100)
NRBC # BLD: 0 /100 WBCS — SIGNIFICANT CHANGE UP (ref 0–0)
PHOSPHATE SERPL-MCNC: 4.3 MG/DL — SIGNIFICANT CHANGE UP (ref 2.5–4.5)
PLATELET # BLD AUTO: 235 K/UL — SIGNIFICANT CHANGE UP (ref 150–400)
POTASSIUM SERPL-MCNC: 4.5 MMOL/L — SIGNIFICANT CHANGE UP (ref 3.5–5.3)
POTASSIUM SERPL-SCNC: 4.5 MMOL/L — SIGNIFICANT CHANGE UP (ref 3.5–5.3)
RBC # BLD: 4.15 M/UL — LOW (ref 4.2–5.8)
RBC # FLD: 13.8 % — SIGNIFICANT CHANGE UP (ref 10.3–14.5)
SARS-COV-2 RNA SPEC QL NAA+PROBE: SIGNIFICANT CHANGE UP
SODIUM SERPL-SCNC: 141 MMOL/L — SIGNIFICANT CHANGE UP (ref 135–145)
WBC # BLD: 4.88 K/UL — SIGNIFICANT CHANGE UP (ref 3.8–10.5)
WBC # FLD AUTO: 4.88 K/UL — SIGNIFICANT CHANGE UP (ref 3.8–10.5)

## 2022-12-04 PROCEDURE — 99233 SBSQ HOSP IP/OBS HIGH 50: CPT | Mod: GC

## 2022-12-04 PROCEDURE — 99233 SBSQ HOSP IP/OBS HIGH 50: CPT

## 2022-12-04 RX ORDER — INSULIN LISPRO 100/ML
7 VIAL (ML) SUBCUTANEOUS
Refills: 0 | Status: DISCONTINUED | OUTPATIENT
Start: 2022-12-04 | End: 2022-12-04

## 2022-12-04 RX ORDER — INSULIN GLARGINE 100 [IU]/ML
12 INJECTION, SOLUTION SUBCUTANEOUS AT BEDTIME
Refills: 0 | Status: DISCONTINUED | OUTPATIENT
Start: 2022-12-04 | End: 2022-12-05

## 2022-12-04 RX ORDER — BUMETANIDE 0.25 MG/ML
1 INJECTION INTRAMUSCULAR; INTRAVENOUS ONCE
Refills: 0 | Status: COMPLETED | OUTPATIENT
Start: 2022-12-04 | End: 2022-12-04

## 2022-12-04 RX ORDER — ATORVASTATIN CALCIUM 80 MG/1
80 TABLET, FILM COATED ORAL AT BEDTIME
Refills: 0 | Status: DISCONTINUED | OUTPATIENT
Start: 2022-12-05 | End: 2022-12-10

## 2022-12-04 RX ORDER — INSULIN LISPRO 100/ML
6 VIAL (ML) SUBCUTANEOUS
Refills: 0 | Status: DISCONTINUED | OUTPATIENT
Start: 2022-12-04 | End: 2022-12-10

## 2022-12-04 RX ADMIN — Medication 6 UNIT(S): at 08:16

## 2022-12-04 RX ADMIN — HEPARIN SODIUM 2000 UNIT(S)/HR: 5000 INJECTION INTRAVENOUS; SUBCUTANEOUS at 21:49

## 2022-12-04 RX ADMIN — Medication 100 MILLIGRAM(S): at 05:14

## 2022-12-04 RX ADMIN — Medication 6 UNIT(S): at 12:11

## 2022-12-04 RX ADMIN — Medication 6 UNIT(S): at 17:19

## 2022-12-04 RX ADMIN — AMLODIPINE BESYLATE 10 MILLIGRAM(S): 2.5 TABLET ORAL at 05:14

## 2022-12-04 RX ADMIN — BUMETANIDE 1 MILLIGRAM(S): 0.25 INJECTION INTRAMUSCULAR; INTRAVENOUS at 12:11

## 2022-12-04 RX ADMIN — Medication 81 MILLIGRAM(S): at 12:11

## 2022-12-04 RX ADMIN — INSULIN GLARGINE 12 UNIT(S): 100 INJECTION, SOLUTION SUBCUTANEOUS at 22:02

## 2022-12-04 RX ADMIN — Medication 20 MILLIGRAM(S): at 05:14

## 2022-12-04 RX ADMIN — Medication 1: at 12:11

## 2022-12-04 NOTE — DISCHARGE NOTE PROVIDER - PROVIDER TOKENS
PROVIDER:[TOKEN:[[id number]],FOLLOWUP:[2 weeks]],PROVIDER:[TOKEN:[[id number]],FOLLOWUP:[1 week],ESTABLISHEDPATIENT:[T]] PROVIDER:[TOKEN:[[id number]],FOLLOWUP:[2 weeks]],PROVIDER:[TOKEN:[[id number]],FOLLOWUP:[1 week],ESTABLISHEDPATIENT:[T]],PROVIDER:[TOKEN:[[id number]],FOLLOWUP:[1 week]],PROVIDER:[TOKEN:[[id number]],FOLLOWUP:[1 week]],FREE:[LAST:[trevin],FIRST:[charley],PHONE:[(   )    -],FAX:[(   )    -],FOLLOWUP:[2 weeks]]

## 2022-12-04 NOTE — DISCHARGE NOTE PROVIDER - CARE PROVIDERS DIRECT ADDRESSES
,[email or web address] ,[email or web address]_[email or web address],DirectAddress_Unknown

## 2022-12-04 NOTE — PROGRESS NOTE ADULT - ATTENDING COMMENTS
Mr. Gutierrez is a 72-year-old man with multiple CAD RF who presented to the hospital with HFrEF.    Plan for invasive coronary angiography to evaluate for obstructive CAD, Tc-99m-PYP to assess for TTR CA (based on LV morphology on TTE), and AC for AF.    35 minutes spent on total patient encounter. More than 50% of the encounter was spent counseling and/or coordination care. The necessity of the time spent on this encounter was due to: time spent evaluating the patient as well as time spent reviewing labs, imaging, and discussing the case with a multidisciplinary team.

## 2022-12-04 NOTE — DISCHARGE NOTE PROVIDER - NSDCFUADDAPPT_GEN_ALL_CORE_FT
.appt APPTS ARE READY TO BE MADE: [ ] YES    Best Family or Patient Contact (if needed):    Additional Information about above appointments (if needed):    1:   2:   3:     Other comments or requests:

## 2022-12-04 NOTE — DISCHARGE NOTE PROVIDER - NSDCCPCAREPLAN_GEN_ALL_CORE_FT
PRINCIPAL DISCHARGE DIAGNOSIS  Diagnosis: Shortness of breath  Assessment and Plan of Treatment: You came into the hospital with shortness of breath. You were found to have fluid in your lungs due to your heart failure. You were given IV formulation of water pills to remove the fluid. Your shortness of breath improved. You had imaging of your heart that showed new onset heart failure. The cardiologists at our hospital did a workup and found      SECONDARY DISCHARGE DIAGNOSES  Diagnosis: Acute decompensated heart failure  Assessment and Plan of Treatment: You were found to have an ejection fraction of 39% and we started you on the following medications to improve your heart function.    Diagnosis: Seizure due to hypoglycemia  Assessment and Plan of Treatment: When you came into the hospital, you complained of nighttime hypoglycemic episodes. Your insulin regimen was re-evaluated and we discovered you were likely taking too much insulin.     PRINCIPAL DISCHARGE DIAGNOSIS  Diagnosis: Shortness of breath  Assessment and Plan of Treatment: You came into the hospital with shortness of breath. You were found to have fluid in your lungs due to your heart failure. You were given IV formulation of water pills to remove the fluid. Your shortness of breath improved. You had imaging of your heart that showed new onset heart failure. The cardiologists at our hospital did a workup and found possible protein buildup around your heart. You will be given medications to help support your heart function and a water pill to help you pee out extra fluid. If you have shortness of breath, leg swelling, dizziness, chest pain, go to the Emergency Department immeidately. Please follow up with Dr. Hernadez cardiology right after discharge.      SECONDARY DISCHARGE DIAGNOSES  Diagnosis: Seizure due to hypoglycemia  Assessment and Plan of Treatment: When you came into the hospital, you complained of nighttime hypoglycemic episodes. Your insulin regimen was re-evaluated and we discovered you were likely taking too much insulin. We recommended 16 units of long acting insulin and 6 units of short acting insulin with meals. Please follow up with your primary doctor after discharge.     PRINCIPAL DISCHARGE DIAGNOSIS  Diagnosis: Shortness of breath  Assessment and Plan of Treatment: You came into the hospital with shortness of breath. You were found to have fluid in your lungs due to your heart failure. You were given IV formulation of water pills to remove the fluid. You will be discharged on an oral water pill. Your shortness of breath improved. You had imaging of your heart that showed new onset heart failure. The cardiologists at our hospital did a workup and found possible protein buildup around your heart. You had an imaging study with showed nonobstructive mild-moderate coronary artery disease. You will be given medications to help support your heart function and a water pill to help you pee out extra fluid. If you have shortness of breath, leg swelling, dizziness, chest pain, go to the Emergency Department immeidately. Please follow up with Dr. Hernadez cardiology right after discharge.      SECONDARY DISCHARGE DIAGNOSES  Diagnosis: Seizure due to hypoglycemia  Assessment and Plan of Treatment: When you came into the hospital, you complained of nighttime hypoglycemic episodes. Your insulin regimen was re-evaluated and we discovered you were likely taking too much insulin. We recommended 17 units of long acting insulin to be taken at breakfast and 6 units of short acting insulin with meals. Please follow up with your primary doctor and endcorine doctor right after discharge.

## 2022-12-04 NOTE — PROGRESS NOTE ADULT - PROBLEM SELECTOR PLAN 1
Orthopnea, elevated proBNP, pulmonary edema suggestive of ADHF  -TTE showed HFrEF (EF 39%)  -on room air  -C/w Lasix 20 BID  -C/w Toprol   -holding ACE-I i/s/o TATI --> consider switching to ARB/ARNI this admission  -cardiology consulted for evaluation of new onset HF  -Angiogram on Monday by Cards Orthopnea, elevated proBNP, pulmonary edema suggestive of ADHF  -TTE showed HFrEF (EF 39%)  -on room air  -Daily diuretic dosing (s/p Bumex 1 IV today)   -C/w Toprol  -holding ACE-I i/s/o TATI --> consider switching to ARB/ARNI this admission  -cardiology consulted for evaluation of new onset HF  -Angiogram on Monday by Cards; NPO at midnight

## 2022-12-04 NOTE — PROGRESS NOTE ADULT - ATTENDING COMMENTS
72M with T2DM on insulin, HTN, HLD here with possible seizures 2/2 hypoglycemia likely from misunderstanding of insulin regimen, complicated by rhabdomyolysis and with worsening exercise tolerance secondary to possible acute heart failure.     # ADHF  Worsening pitting edema w/ worsening exercise tolerance and paroxysmal nocturnal dyspnea suggestive of heart failure, with elevated BMP  - echo with LVSD, EF 39%. already on ACE (held for tati), can transition to entresto when TATI resolves and after cath  - strict I&Os  - daily standing weights  - cardiology consult - ischemic eval when euvolemic  - c/w diuresis per cards  - dyspnea symptomatically improving  - r/o amyloid per cards    # Aflutter  - heparin gtt (CHADsVASC at least 4)  - can be discharged on doac if insurance covers  - cards consult    # Rhabdo  Mildly elevated CK i/s/o possible seizures 2/2 hypoglycemia  - Cr improved with 500cc in ED + gentle hydration 12/1  - trend CK til normalization    # TATI  Elevated Cr i/s/o rhabdo, need to find out baseline (baseline Cr 2 per pcp)  - Avoid nephrotoxic agents  - Trend Cr daily    # T2DM  On basal/bolus 80BID/45qac per endocrinologist office. However, pt is confused about his exact regimen.  - weight base dose for now given he may not be taking his insulin correctly and he has been hypoglycemic at home  - monitor FS qac qhs   - basal/bolus 17/6 with adequate control  - counseled on carbohydrate control  - consult dietician for education on consistent carb diets at home  - a1c 9.8, f/u lipid panel    Rest of plan as above. Discussed with HS.

## 2022-12-04 NOTE — PROGRESS NOTE ADULT - ASSESSMENT
73 y/o M PMH IDDM, HTN, HLD p/w SOB and night time "seizures" with associated hypoglycemia. SOB with associated elevated proBNP, orthopnea, and pleural effusion found to have ADHF (EF 39%) and poor insulin use at home.  71 y/o M PMH IDDM, HTN, HLD p/w SOB and night time "seizures" with associated hypoglycemia. SOB with associated elevated proBNP, orthopnea, and pleural effusion found to have ADHF (EF 39%) and poor insulin use at home. Currently pending eval by cardiology for new onset HF.

## 2022-12-04 NOTE — PROGRESS NOTE ADULT - ASSESSMENT
The patient is a 71yo male with a PMH of DM, HTN, HLD, hypoglycemic seizures who presented to the ED with dyspnea and orthopnea, admitted with concern for heart failure exacerbation.    1. New onset CHFrEF  2. ?New onset AF     Recommendations:   - CXR 11/30: Cardiac size is within normal limits. Trace right sided pleural effusion  - TTE 12/1: EF 39%, Calcified aortic valve. The non and the left cusp appear functionally fused by calcification. Peak transaortic valve gradient equals 9 mm Hg, estimated aortic valve area equals 2.3 sqcm. Moderate  global left ventricular systolic dysfunction.  - No prior cath   - Troponin 96 --> 91   - BNP 3355  - Initial EKG and telemetry: AF/Aflutter, HRs 90s-100  - Heparin drip for AC given Atrial Fibrillation   - Continue home Metoprolol Succinate 100mg   - Agree with diuresis, would decrease lasix IV to 20mg once daily   - If adequate diuresis is achieved, an eventual angiogram can be planned, likely Monday. (Keep NPO sunday night, confirm repeat COVID test sunday)   - Strict I/Os and weights   - Pt will need addition of additional GDMT with improvement in kidney function   - Recommend [id number] scan and serum free light chains for further evaluation

## 2022-12-04 NOTE — DISCHARGE NOTE PROVIDER - HOSPITAL COURSE
71 y/o M PMH IDDM, HTN, HLD p/w SOB and night time "seizures" with associated hypoglycemia. SOB with associated elevated proBNP, orthopnea, and pleural effusion found to have ADHF (EF 39% on TTE) and poor insulin use at home. Patient's insulin regimen was restarted with weight based dosing and patient did well on basal bolus regimen of ________. Patient's HF was optimized with diuretic use and SOB improved. Patient underwent angiogram to evaluate for ischemic disease and nuclear scan to evaluate for amyloidosis. 73 y/o M PMH IDDM, HTN, HLD p/w SOB and night time "seizures" with associated hypoglycemia. SOB with associated elevated proBNP, orthopnea, and pleural effusion found to have ADHF (EF 39% on TTE) and poor insulin use at home. Patient's insulin regimen was restarted with weight based dosing and patient did well on basal bolus regimen of 17U Glargine and 6U Admelog TID with meals. Patient's HF was optimized with diuretic use and SOB improved. Patient underwent angiogram to evaluate for ischemic disease and nuclear scan to evaluate for amyloidosis. The angiogram was negative for acute coronary obstruction. The nuclear scan was strongly suggestive of amyloidosis. Cr downtrended. 71 y/o M PMH IDDM, HTN, HLD p/w SOB and night time "seizures" with associated hypoglycemia. SOB with associated elevated proBNP, orthopnea, and pleural effusion found to have ADHF (EF 39% on TTE) and poor insulin use at home. Patient's insulin regimen was restarted with weight based dosing and patient did well on basal bolus regimen of 17U Detemir and 6U Admelog TID with meals. Patient's HF was optimized with diuretic use and SOB improved. Patient underwent angiogram to evaluate for ischemic disease and nuclear scan to evaluate for amyloidosis. The angiogram was negative for acute coronary obstruction, but showed mild-moderate nonobstructive CAD. The nuclear scan was strongly suggestive of amyloidosis. Cr downtrended. Cardiology recommended Aspirin, statin, BB, Eliquis, and Lasix on discharge. Nephro recommended switching glargine to detemir and dosing in AM along with pre-meal insulin. Patient was scheduled with PCP, endocrine, and Cardiology follow ups.

## 2022-12-04 NOTE — DISCHARGE NOTE PROVIDER - CARE PROVIDER_API CALL
Vinod Martinez)  Cardiology  [address] [place] [address]  Phone: [phone]  Fax: [phone]  Follow Up Time: 2 weeks    Mohseni, Haleh G (MD)  Family Medicine  [address] [place] [address]  Phone: [phone]  Fax: [phone]  Established Patient  Follow Up Time: 1 week   Vinod Martinez)  Cardiology  [address] [place] [address]  Phone: [phone]  Fax: [phone]  Follow Up Time: 2 weeks    Mohseni, Haleh G (MD)  Family Medicine  [address] [place] [address]  Phone: [phone]  Fax: [phone]  Established Patient  Follow Up Time: 1 week    Ernst Hernadez)  Cardiology; Internal Medicine  [address] [facility], [address]  [place] [address]  Phone: [phone]  Fax: [phone]  Follow Up Time: 1 week    Perez York (DO)  EndocrinologyMetabDiabetes; Internal Medicine  [address] [place] [address]  Phone: [phone]  Fax: [phone]  Follow Up Time: 1 week    charley zhong  Phone: (   )    -  Fax: (   )    -  Follow Up Time: 2 weeks   detailed exam

## 2022-12-04 NOTE — PROGRESS NOTE ADULT - PROBLEM SELECTOR PLAN 9
DVT PPx: Heparin Subq  Diet: Consistent carb no snack  Dispo: Medically active DVT PPx: Heparin gtt  Diet: Consistent carb no snack  Dispo: Medically active

## 2022-12-04 NOTE — PROGRESS NOTE ADULT - SUBJECTIVE AND OBJECTIVE BOX
Patient seen and examined at bedside.    Overnight Events:     No AEON     Denies chest pain, SOB, palpitations. Had palpitations earlier yesterday on exertion.     Tele showed Afib HR 80s-90s       Current Meds:  amLODIPine   Tablet 10 milliGRAM(s) Oral daily  aspirin  chewable 81 milliGRAM(s) Oral daily  dextrose 5%. 1000 milliLiter(s) IV Continuous <Continuous>  dextrose 5%. 1000 milliLiter(s) IV Continuous <Continuous>  dextrose 50% Injectable 25 Gram(s) IV Push once  dextrose 50% Injectable 12.5 Gram(s) IV Push once  dextrose 50% Injectable 25 Gram(s) IV Push once  dextrose Oral Gel 15 Gram(s) Oral once PRN  furosemide   Injectable 20 milliGRAM(s) IV Push two times a day  glucagon  Injectable 1 milliGRAM(s) IntraMuscular once  heparin  Infusion.  Unit(s)/Hr IV Continuous <Continuous>  insulin glargine Injectable (LANTUS) 17 Unit(s) SubCutaneous at bedtime  insulin lispro (ADMELOG) corrective regimen sliding scale   SubCutaneous three times a day before meals  insulin lispro Injectable (ADMELOG) 6 Unit(s) SubCutaneous three times a day before meals  metoprolol succinate  milliGRAM(s) Oral daily      Vitals:  T(F): 98.1 (12-04), Max: 98.1 (12-04)  HR: 65 (12-04) (65 - 134)  BP: 125/79 (12-04) (101/70 - 128/87)  RR: 16 (12-04)  SpO2: 96% (12-04)  I&O's Summary    02 Dec 2022 07:01  -  03 Dec 2022 07:00  --------------------------------------------------------  IN: 425 mL / OUT: 1330 mL / NET: -905 mL    03 Dec 2022 07:01  -  04 Dec 2022 06:43  --------------------------------------------------------  IN: 620 mL / OUT: 1450 mL / NET: -830 mL        Physical Exam:  Appearance: No acute distress; well appearing  Cardiovascular: Irregular, S1, S2, no murmurs, rubs, or gallops; no edema; no JVD  Respiratory: Clear to auscultation bilaterally  Musculoskeletal: No clubbing; no joint deformity, no LE edema   Neurologic: Non-focal  Psychiatry: AAOx3, mood & affect appropriate  Skin: No rashes, ecchymoses, or cyanosis                          12.3   4.88  )-----------( 235      ( 04 Dec 2022 05:49 )             38.6     12-03    141  |  106  |  33<H>  ----------------------------<  121<H>  4.6   |  23  |  2.09<H>    Ca    9.3      03 Dec 2022 07:07  Phos  4.3     12-04  Mg     2.1     12-04    TPro  6.5  /  Alb  3.8  /  TBili  0.3  /  DBili  x   /  AST  38  /  ALT  36  /  AlkPhos  45  12-03    PT/INR - ( 02 Dec 2022 17:03 )   PT: 12.6 sec;   INR: 1.09 ratio         PTT - ( 04 Dec 2022 05:49 )  PTT:97.5 sec  CARDIAC MARKERS ( 04 Dec 2022 05:50 )  x     / x     / x     / 781 U/L / x     / x      CARDIAC MARKERS ( 03 Dec 2022 07:07 )  x     / x     / x     / 1277 U/L / x     / x      CARDIAC MARKERS ( 02 Dec 2022 05:42 )  x     / x     / x     / 1921 U/L / x     / x      CARDIAC MARKERS ( 01 Dec 2022 06:35 )  x     / x     / x     / 3256 U/L / x     / 7.7 ng/mL  CARDIAC MARKERS ( 01 Dec 2022 01:23 )  91 ng/L / x     / x     / x     / x     / x          Serum Pro-Brain Natriuretic Peptide: 3355 pg/mL (11-30 @ 22:14)          New ECG(s): Personally reviewed    Echo:    Stress Testing:     Cath:    Imaging:    Interpretation of Telemetry:

## 2022-12-04 NOTE — DISCHARGE NOTE PROVIDER - NSDCMRMEDTOKEN_GEN_ALL_CORE_FT
amlodipine-benazepril 10 mg-20 mg oral capsule: 1 cap(s) orally once a day  aspirin 81 mg oral tablet, chewable: 1 tab(s) orally once a day  ezetimibe 10 mg oral tablet: 1 tab(s) orally once a day  Levemir 100 units/mL subcutaneous solution: 80 unit(s) subcutaneous 2 times a day  Lipitor 80 mg oral tablet: 1 tab(s) orally once a day  metFORMIN 500 mg oral tablet: 1 tab(s) orally 2 times a day  Metoprolol Succinate  mg oral tablet, extended release: 1 tab(s) orally once a day  NovoLOG 100 units/mL injectable solution: 45 unit(s) injectable 3 times a day with meals   alcohol swabs : Apply topically to affected area 4 times a day   aspirin 81 mg oral tablet, chewable: 1 tab(s) orally once a day  atorvastatin 80 mg oral tablet: 1 tab(s) orally once a day (at bedtime)  Eliquis 5 mg oral tablet: 1 tab(s) orally 2 times a day   ezetimibe 10 mg oral tablet: 1 tab(s) orally once a day  glucometer (per patient&#x27;s insurance): Test blood sugars four times a day. Dispense #1 glucometer.  glucose tablets: Follow instructions on bottle when sugar is low.  Insulin Pen Needles, 4mm: 1 application subcutaneously 4 times a day. ** Use with insulin pen **   lancets: 1 application subcutaneously 4 times a day   Lantus Solostar Pen 100 units/mL subcutaneous solution: 17 unit(s) subcutaneous once a day in the morning before breakfast   Lasix 20 mg oral tablet: 1 tab(s) orally once a day   metoprolol succinate 100 mg oral tablet, extended release: 1 tab(s) orally once a day  NovoLOG FlexPen 100 units/mL injectable solution: 6 unit(s) subcutaneous 3 times a day (before meals)

## 2022-12-04 NOTE — PROGRESS NOTE ADULT - SUBJECTIVE AND OBJECTIVE BOX
INTERVAL HPI/OVERNIGHT EVENTS:    SUBJECTIVE: Patient seen and examined at bedside.    OBJECTIVE:    VITAL SIGNS:  ICU Vital Signs Last 24 Hrs  T(C): 36.7 (04 Dec 2022 04:43), Max: 36.7 (03 Dec 2022 11:22)  T(F): 98.1 (04 Dec 2022 04:43), Max: 98.1 (04 Dec 2022 04:43)  HR: 65 (04 Dec 2022 04:43) (65 - 134)  BP: 125/79 (04 Dec 2022 04:43) (101/70 - 128/87)  BP(mean): --  ABP: --  ABP(mean): --  RR: 16 (04 Dec 2022 04:43) (16 - 19)  SpO2: 96% (04 Dec 2022 04:43) (96% - 97%)    O2 Parameters below as of 04 Dec 2022 04:43  Patient On (Oxygen Delivery Method): room air              12-03 @ 07:01  -  12-04 @ 07:00  --------------------------------------------------------  IN: 860 mL / OUT: 1450 mL / NET: -590 mL      CAPILLARY BLOOD GLUCOSE      POCT Blood Glucose.: 113 mg/dL (03 Dec 2022 21:53)      PHYSICAL EXAM:    General: NAD  HEENT: NC/AT; PERRL, clear conjunctiva  Neck: supple  Respiratory: CTA b/l  Cardiovascular: +S1/S2; RRR  Abdomen: soft, NT/ND; +BS x4  Extremities:  no LE edema  Vascular: WWP, 2+ peripheral pulses b/l;  Skin: normal color and turgor; no rash  Neurological: A&Ox3, move all extremities. CN II-XII intact    MEDICATIONS:  MEDICATIONS  (STANDING):  amLODIPine   Tablet 10 milliGRAM(s) Oral daily  aspirin  chewable 81 milliGRAM(s) Oral daily  dextrose 5%. 1000 milliLiter(s) (100 mL/Hr) IV Continuous <Continuous>  dextrose 5%. 1000 milliLiter(s) (50 mL/Hr) IV Continuous <Continuous>  dextrose 50% Injectable 25 Gram(s) IV Push once  dextrose 50% Injectable 12.5 Gram(s) IV Push once  dextrose 50% Injectable 25 Gram(s) IV Push once  furosemide   Injectable 20 milliGRAM(s) IV Push two times a day  glucagon  Injectable 1 milliGRAM(s) IntraMuscular once  heparin  Infusion.  Unit(s)/Hr (20 mL/Hr) IV Continuous <Continuous>  insulin glargine Injectable (LANTUS) 17 Unit(s) SubCutaneous at bedtime  insulin lispro (ADMELOG) corrective regimen sliding scale   SubCutaneous three times a day before meals  insulin lispro Injectable (ADMELOG) 6 Unit(s) SubCutaneous three times a day before meals  metoprolol succinate  milliGRAM(s) Oral daily    MEDICATIONS  (PRN):  dextrose Oral Gel 15 Gram(s) Oral once PRN Blood Glucose LESS THAN 70 milliGRAM(s)/deciliter      ALLERGIES:  Allergies    No Known Allergies    Intolerances        LABS:                        12.3   4.88  )-----------( 235      ( 04 Dec 2022 05:49 )             38.6     12-03    141  |  106  |  33<H>  ----------------------------<  121<H>  4.6   |  23  |  2.09<H>    Ca    9.3      03 Dec 2022 07:07  Phos  4.3     12-04  Mg     2.1     12-04    TPro  6.5  /  Alb  3.8  /  TBili  0.3  /  DBili  x   /  AST  38  /  ALT  36  /  AlkPhos  45  12-03    PT/INR - ( 02 Dec 2022 17:03 )   PT: 12.6 sec;   INR: 1.09 ratio         PTT - ( 04 Dec 2022 05:49 )  PTT:97.5 sec      RADIOLOGY & ADDITIONAL TESTS: Reviewed. INTERVAL HPI/OVERNIGHT EVENTS: VIVEK OVN.     SUBJECTIVE: Patient seen and examined at bedside. This AM patient states he is feeling well with no SOB and no exertional SOB. Denies palpitations, chest pain, lightheadedness at this time.     OBJECTIVE:    VITAL SIGNS:  ICU Vital Signs Last 24 Hrs  T(C): 36.7 (04 Dec 2022 04:43), Max: 36.7 (03 Dec 2022 11:22)  T(F): 98.1 (04 Dec 2022 04:43), Max: 98.1 (04 Dec 2022 04:43)  HR: 65 (04 Dec 2022 04:43) (65 - 134)  BP: 125/79 (04 Dec 2022 04:43) (101/70 - 128/87)  BP(mean): --  ABP: --  ABP(mean): --  RR: 16 (04 Dec 2022 04:43) (16 - 19)  SpO2: 96% (04 Dec 2022 04:43) (96% - 97%)    O2 Parameters below as of 04 Dec 2022 04:43  Patient On (Oxygen Delivery Method): room air              12-03 @ 07:01  -  12-04 @ 07:00  --------------------------------------------------------  IN: 860 mL / OUT: 1450 mL / NET: -590 mL      CAPILLARY BLOOD GLUCOSE      POCT Blood Glucose.: 113 mg/dL (03 Dec 2022 21:53)      PHYSICAL EXAM:    General: NAD  HEENT: NC/AT; PERRL, clear conjunctiva  Neck: supple  Respiratory: CTA b/l  Cardiovascular: +S1/S2; Tachycardic with irregular rhythm  Abdomen: soft, NT/ND; +BS x4  Extremities:  2+ pitting edema bilaterally in lower extremities  Vascular: WWP, 2+ peripheral pulses b/l;  Skin: normal color and turgor; no rash  Neurological: A&Ox3, move all extremities. CN II-XII intact    MEDICATIONS:  MEDICATIONS  (STANDING):  amLODIPine   Tablet 10 milliGRAM(s) Oral daily  aspirin  chewable 81 milliGRAM(s) Oral daily  dextrose 5%. 1000 milliLiter(s) (100 mL/Hr) IV Continuous <Continuous>  dextrose 5%. 1000 milliLiter(s) (50 mL/Hr) IV Continuous <Continuous>  dextrose 50% Injectable 25 Gram(s) IV Push once  dextrose 50% Injectable 12.5 Gram(s) IV Push once  dextrose 50% Injectable 25 Gram(s) IV Push once  furosemide   Injectable 20 milliGRAM(s) IV Push two times a day  glucagon  Injectable 1 milliGRAM(s) IntraMuscular once  heparin  Infusion.  Unit(s)/Hr (20 mL/Hr) IV Continuous <Continuous>  insulin glargine Injectable (LANTUS) 17 Unit(s) SubCutaneous at bedtime  insulin lispro (ADMELOG) corrective regimen sliding scale   SubCutaneous three times a day before meals  insulin lispro Injectable (ADMELOG) 6 Unit(s) SubCutaneous three times a day before meals  metoprolol succinate  milliGRAM(s) Oral daily    MEDICATIONS  (PRN):  dextrose Oral Gel 15 Gram(s) Oral once PRN Blood Glucose LESS THAN 70 milliGRAM(s)/deciliter      ALLERGIES:  Allergies    No Known Allergies    Intolerances        LABS:                        12.3   4.88  )-----------( 235      ( 04 Dec 2022 05:49 )             38.6     12-03    141  |  106  |  33<H>  ----------------------------<  121<H>  4.6   |  23  |  2.09<H>    Ca    9.3      03 Dec 2022 07:07  Phos  4.3     12-04  Mg     2.1     12-04    TPro  6.5  /  Alb  3.8  /  TBili  0.3  /  DBili  x   /  AST  38  /  ALT  36  /  AlkPhos  45  12-03    PT/INR - ( 02 Dec 2022 17:03 )   PT: 12.6 sec;   INR: 1.09 ratio         PTT - ( 04 Dec 2022 05:49 )  PTT:97.5 sec      RADIOLOGY & ADDITIONAL TESTS: Reviewed.

## 2022-12-05 DIAGNOSIS — N17.9 ACUTE KIDNEY FAILURE, UNSPECIFIED: ICD-10-CM

## 2022-12-05 LAB
ALBUMIN SERPL ELPH-MCNC: 3.6 G/DL — SIGNIFICANT CHANGE UP (ref 3.3–5)
ALP SERPL-CCNC: 41 U/L — SIGNIFICANT CHANGE UP (ref 40–120)
ALT FLD-CCNC: 32 U/L — SIGNIFICANT CHANGE UP (ref 10–45)
ANION GAP SERPL CALC-SCNC: 12 MMOL/L — SIGNIFICANT CHANGE UP (ref 5–17)
APPEARANCE UR: CLEAR — SIGNIFICANT CHANGE UP
APTT BLD: 97.9 SEC — HIGH (ref 27.5–35.5)
AST SERPL-CCNC: 23 U/L — SIGNIFICANT CHANGE UP (ref 10–40)
BILIRUB SERPL-MCNC: 0.2 MG/DL — SIGNIFICANT CHANGE UP (ref 0.2–1.2)
BILIRUB UR-MCNC: NEGATIVE — SIGNIFICANT CHANGE UP
BLD GP AB SCN SERPL QL: NEGATIVE — SIGNIFICANT CHANGE UP
BUN SERPL-MCNC: 35 MG/DL — HIGH (ref 7–23)
CALCIUM SERPL-MCNC: 9.1 MG/DL — SIGNIFICANT CHANGE UP (ref 8.4–10.5)
CHLORIDE SERPL-SCNC: 105 MMOL/L — SIGNIFICANT CHANGE UP (ref 96–108)
CHOLEST SERPL-MCNC: 153 MG/DL — SIGNIFICANT CHANGE UP
CK SERPL-CCNC: 398 U/L — HIGH (ref 30–200)
CO2 SERPL-SCNC: 23 MMOL/L — SIGNIFICANT CHANGE UP (ref 22–31)
COLOR SPEC: YELLOW — SIGNIFICANT CHANGE UP
CREAT ?TM UR-MCNC: 178 MG/DL — SIGNIFICANT CHANGE UP
CREAT SERPL-MCNC: 2.06 MG/DL — HIGH (ref 0.5–1.3)
DIFF PNL FLD: ABNORMAL
EGFR: 34 ML/MIN/1.73M2 — LOW
GLUCOSE BLDC GLUCOMTR-MCNC: 133 MG/DL — HIGH (ref 70–99)
GLUCOSE BLDC GLUCOMTR-MCNC: 149 MG/DL — HIGH (ref 70–99)
GLUCOSE BLDC GLUCOMTR-MCNC: 164 MG/DL — HIGH (ref 70–99)
GLUCOSE BLDC GLUCOMTR-MCNC: 176 MG/DL — HIGH (ref 70–99)
GLUCOSE SERPL-MCNC: 142 MG/DL — HIGH (ref 70–99)
GLUCOSE UR QL: NEGATIVE — SIGNIFICANT CHANGE UP
HCT VFR BLD CALC: 37.6 % — LOW (ref 39–50)
HDLC SERPL-MCNC: 36 MG/DL — LOW
HGB BLD-MCNC: 11.8 G/DL — LOW (ref 13–17)
INR BLD: 1.15 RATIO — SIGNIFICANT CHANGE UP (ref 0.88–1.16)
KAPPA LC SER QL IFE: 4.11 MG/DL — HIGH (ref 0.33–1.94)
KAPPA/LAMBDA FREE LIGHT CHAIN RATIO, SERUM: 1.49 RATIO — SIGNIFICANT CHANGE UP (ref 0.26–1.65)
KETONES UR-MCNC: NEGATIVE — SIGNIFICANT CHANGE UP
LAMBDA LC SER QL IFE: 2.75 MG/DL — HIGH (ref 0.57–2.63)
LEUKOCYTE ESTERASE UR-ACNC: NEGATIVE — SIGNIFICANT CHANGE UP
LIPID PNL WITH DIRECT LDL SERPL: 96 MG/DL — SIGNIFICANT CHANGE UP
MAGNESIUM SERPL-MCNC: 2.2 MG/DL — SIGNIFICANT CHANGE UP (ref 1.6–2.6)
MCHC RBC-ENTMCNC: 29.4 PG — SIGNIFICANT CHANGE UP (ref 27–34)
MCHC RBC-ENTMCNC: 31.4 GM/DL — LOW (ref 32–36)
MCV RBC AUTO: 93.5 FL — SIGNIFICANT CHANGE UP (ref 80–100)
NITRITE UR-MCNC: NEGATIVE — SIGNIFICANT CHANGE UP
NON HDL CHOLESTEROL: 117 MG/DL — SIGNIFICANT CHANGE UP
NRBC # BLD: 0 /100 WBCS — SIGNIFICANT CHANGE UP (ref 0–0)
OSMOLALITY UR: 633 MOS/KG — SIGNIFICANT CHANGE UP (ref 300–900)
PH UR: 6 — SIGNIFICANT CHANGE UP (ref 5–8)
PHOSPHATE SERPL-MCNC: 4.1 MG/DL — SIGNIFICANT CHANGE UP (ref 2.5–4.5)
PLATELET # BLD AUTO: 217 K/UL — SIGNIFICANT CHANGE UP (ref 150–400)
POTASSIUM SERPL-MCNC: 4.4 MMOL/L — SIGNIFICANT CHANGE UP (ref 3.5–5.3)
POTASSIUM SERPL-SCNC: 4.4 MMOL/L — SIGNIFICANT CHANGE UP (ref 3.5–5.3)
PROT ?TM UR-MCNC: 69 MG/DL — HIGH (ref 0–12)
PROT SERPL-MCNC: 6.4 G/DL — SIGNIFICANT CHANGE UP (ref 6–8.3)
PROT UR-MCNC: 100 — SIGNIFICANT CHANGE UP
PROT/CREAT UR-RTO: 0.4 RATIO — HIGH (ref 0–0.2)
PROTHROM AB SERPL-ACNC: 13.2 SEC — SIGNIFICANT CHANGE UP (ref 10.5–13.4)
RBC # BLD: 4.02 M/UL — LOW (ref 4.2–5.8)
RBC # FLD: 13.7 % — SIGNIFICANT CHANGE UP (ref 10.3–14.5)
RH IG SCN BLD-IMP: POSITIVE — SIGNIFICANT CHANGE UP
RH IG SCN BLD-IMP: POSITIVE — SIGNIFICANT CHANGE UP
SODIUM SERPL-SCNC: 141 MMOL/L — SIGNIFICANT CHANGE UP (ref 135–145)
SP GR SPEC: 1.02 — SIGNIFICANT CHANGE UP (ref 1.01–1.02)
TRIGL SERPL-MCNC: 101 MG/DL — SIGNIFICANT CHANGE UP
UROBILINOGEN FLD QL: NEGATIVE — SIGNIFICANT CHANGE UP
UUN UR-MCNC: 1112 MG/DL — SIGNIFICANT CHANGE UP
WBC # BLD: 4.3 K/UL — SIGNIFICANT CHANGE UP (ref 3.8–10.5)
WBC # FLD AUTO: 4.3 K/UL — SIGNIFICANT CHANGE UP (ref 3.8–10.5)

## 2022-12-05 PROCEDURE — 78830 RP LOCLZJ TUM SPECT W/CT 1: CPT | Mod: 26

## 2022-12-05 PROCEDURE — 99223 1ST HOSP IP/OBS HIGH 75: CPT

## 2022-12-05 PROCEDURE — 99233 SBSQ HOSP IP/OBS HIGH 50: CPT | Mod: GC

## 2022-12-05 PROCEDURE — 76770 US EXAM ABDO BACK WALL COMP: CPT | Mod: 26

## 2022-12-05 RX ORDER — FUROSEMIDE 40 MG
20 TABLET ORAL DAILY
Refills: 0 | Status: DISCONTINUED | OUTPATIENT
Start: 2022-12-06 | End: 2022-12-06

## 2022-12-05 RX ORDER — INSULIN GLARGINE 100 [IU]/ML
17 INJECTION, SOLUTION SUBCUTANEOUS AT BEDTIME
Refills: 0 | Status: DISCONTINUED | OUTPATIENT
Start: 2022-12-05 | End: 2022-12-09

## 2022-12-05 RX ORDER — SODIUM CHLORIDE 9 MG/ML
1000 INJECTION, SOLUTION INTRAVENOUS
Refills: 0 | Status: DISCONTINUED | OUTPATIENT
Start: 2022-12-05 | End: 2022-12-05

## 2022-12-05 RX ORDER — FUROSEMIDE 40 MG
20 TABLET ORAL DAILY
Refills: 0 | Status: DISCONTINUED | OUTPATIENT
Start: 2022-12-05 | End: 2022-12-05

## 2022-12-05 RX ORDER — INSULIN GLARGINE 100 [IU]/ML
17 INJECTION, SOLUTION SUBCUTANEOUS AT BEDTIME
Refills: 0 | Status: DISCONTINUED | OUTPATIENT
Start: 2022-12-05 | End: 2022-12-05

## 2022-12-05 RX ADMIN — Medication 100 MILLIGRAM(S): at 04:53

## 2022-12-05 RX ADMIN — INSULIN GLARGINE 17 UNIT(S): 100 INJECTION, SOLUTION SUBCUTANEOUS at 21:49

## 2022-12-05 RX ADMIN — SODIUM CHLORIDE 60 MILLILITER(S): 9 INJECTION, SOLUTION INTRAVENOUS at 16:08

## 2022-12-05 RX ADMIN — Medication 6 UNIT(S): at 17:10

## 2022-12-05 RX ADMIN — Medication 1: at 17:10

## 2022-12-05 RX ADMIN — Medication 81 MILLIGRAM(S): at 11:07

## 2022-12-05 RX ADMIN — HEPARIN SODIUM 2000 UNIT(S)/HR: 5000 INJECTION INTRAVENOUS; SUBCUTANEOUS at 06:50

## 2022-12-05 RX ADMIN — Medication 6 UNIT(S): at 12:05

## 2022-12-05 RX ADMIN — ATORVASTATIN CALCIUM 80 MILLIGRAM(S): 80 TABLET, FILM COATED ORAL at 21:14

## 2022-12-05 NOTE — PROGRESS NOTE ADULT - PROBLEM SELECTOR PLAN 3
Elevated CK and + Blood in urine c/f rhabdomyolysis i/s/o hypoglycemic seizures  -Cr 2.02 on presentation; improved with fluids; slightly increased creatinine today (1.6 --> 1.8) likely 2/2 cardiorenal now  -CK downtrending  -ctm cr  -consider repeat UA later this admission to ensure no RBCs Elevated CK and + Blood in urine c/f rhabdomyolysis i/s/o hypoglycemic seizures  -Cr 2.02 on presentation; unclear baseline as PCP said he has CKD3, and had Cr>2 on lab imaging in past  -CK significantly downtrending  [ ] f/u repeat UA+Microscopy  - Cards recommended nephro input for cath given elevated Cr  [ ] f/u Nephro recs Elevated CK and + Blood in urine c/f rhabdomyolysis i/s/o hypoglycemic seizures  -Cr 2.02 on presentation; unclear baseline as PCP said he has CKD3, and had Cr>2 on lab imaging in past  -CK significantly downtrending  [ ] f/u repeat UA+Microscopy  - Cards recommended nephro input for cath given elevated Cr  - TATI on CKD likely 2/2 recent rhabdomyolysis (resolving), CHF, or diuretics   [ ] f/u Nephro recs- give fluids 6 hrs before, 6 hrs after @ 1 cc/kg/hr if planned for angio and euvolemic

## 2022-12-05 NOTE — CONSULT NOTE ADULT - REASON FOR ADMISSION
Hypoglycemia Episodes and Orthopnea with exertional SOB
Hypoglycemia Episodes and Orthopnea with exertional SOB

## 2022-12-05 NOTE — DIETITIAN INITIAL EVALUATION ADULT - PERSON TAUGHT/METHOD
Extensive DM and HF nutrition education. No further questions.   Effectively taught back 3 main takeaways./verbal instruction/patient instructed

## 2022-12-05 NOTE — DIETITIAN INITIAL EVALUATION ADULT - NS FNS DIET ORDER
Diet, NPO after Midnight:      NPO Start Date: 04-Dec-2022,   NPO Start Time: 23:59  Except Medications  With Ice Chips/Sips of Water (12-04-22 @ 18:03)

## 2022-12-05 NOTE — PROGRESS NOTE ADULT - ASSESSMENT
71 y/o M PMH IDDM, HTN, HLD p/w SOB and night time "seizures" with associated hypoglycemia. SOB with associated elevated proBNP, orthopnea, and pleural effusion found to have ADHF (EF 39%) and poor insulin use at home. Currently pending eval by cardiology for new onset HF.  73 y/o M PMH IDDM, HTN, HLD p/w SOB and night time "seizures" with associated hypoglycemia. SOB with associated elevated proBNP, orthopnea, and pleural effusion found to have ADHF (EF 39%) and poor insulin use at home. Currently pending cath by cardiology for new onset HF.

## 2022-12-05 NOTE — DIETITIAN INITIAL EVALUATION ADULT - OTHER INFO
GI/Intake:   -Per flowsheet, % intake of meals; endorses good PO intake   -NPO for testing this morning   -No BM documented thus far; no bowel regimen noted  -Denies N/V, diarrhea/constipation     Endo:   -Hx of DM; latest A1c: 9.7%; 14% x 6 months ago - per PT   -Endorses taking insulin daily   -12 units long acting, 6 units short acting TID, sliding scale insulin ordered in-house   -Reinforced DM education: proper portion sizes (MyPlate Method), pairing proteins with carbohydrates, consuming whole grain sources     CV:   -New onset HF  -Lasix ordered for diuresis   -Provided HF Nutrition Therapy: low sodium diet education, importance of daily weights     Renal:   -TATI likely 2/2 rhabdomyolysis   -Lytes WDL; monitor closely with diuretic     Weight Hx:   -Current dosin pounds   -Per Pt, UBW: 230-245   -Attempting intentional weight loss via physical activity

## 2022-12-05 NOTE — CONSULT NOTE ADULT - SUBJECTIVE AND OBJECTIVE BOX
[facility] DIVISION OF KIDNEY DISEASES AND HYPERTENSION -- [phone]  -- INITIAL CONSULT NOTE  --------------------------------------------------------------------------------  HPI:  Attempted to see pt, pt was at nuclear medicine    History from chart:      PAST HISTORY  --------------------------------------------------------------------------------  PAST MEDICAL & SURGICAL HISTORY:  Diabetes insipidus  Hypertension  Diabetes  No significant past surgical history    FAMILY HISTORY:  FHx: myocardial infarction (Mother, Sibling)  PAST SOCIAL HISTORY:  ALLERGIES & MEDICATIONS  --------------------------------------------------------------------------------  Allergies  No Known Allergies    Intolerances      Standing Inpatient Medications  aspirin  chewable 81 milliGRAM(s) Oral daily  atorvastatin 80 milliGRAM(s) Oral at bedtime  dextrose 5%. 1000 milliLiter(s) IV Continuous <Continuous>  dextrose 5%. 1000 milliLiter(s) IV Continuous <Continuous>  dextrose 50% Injectable 25 Gram(s) IV Push once  dextrose 50% Injectable 12.5 Gram(s) IV Push once  dextrose 50% Injectable 25 Gram(s) IV Push once  furosemide   Injectable 20 milliGRAM(s) IV Push daily  glucagon  Injectable 1 milliGRAM(s) IntraMuscular once  heparin  Infusion.  Unit(s)/Hr IV Continuous <Continuous>  insulin glargine Injectable (LANTUS) 17 Unit(s) SubCutaneous at bedtime  insulin lispro (ADMELOG) corrective regimen sliding scale   SubCutaneous three times a day before meals  insulin lispro Injectable (ADMELOG) 6 Unit(s) SubCutaneous three times a day before meals  metoprolol succinate  milliGRAM(s) Oral daily    PRN Inpatient Medications  dextrose Oral Gel 15 Gram(s) Oral once PRN      REVIEW OF SYSTEMS  --------------------------------------------------------------------------------  Pt not yet seen    All other systems were reviewed and are negative, except as noted.    VITALS/PHYSICAL EXAM  --------------------------------------------------------------------------------  T(C): 36.6 (12-05-22 @ 10:41), Max: 36.8 (12-04-22 @ 19:30)  HR: 96 (12-05-22 @ 10:41) (65 - 104)  BP: 120/75 (12-05-22 @ 10:41) (120/75 - 127/82)  RR: 18 (12-05-22 @ 10:41) (16 - 20)  SpO2: 98% (12-05-22 @ 10:41) (93% - 98%)  Wt(kg): --        12-04-22 @ 07:01  -  12-05-22 @ 07:00  --------------------------------------------------------  IN: 1060 mL / OUT: 1400 mL / NET: -340 mL        Physical Exam:    Pt not yet seen    LABS/STUDIES  --------------------------------------------------------------------------------              11.8   4.30  >-----------<  217      [12-05-22 @ 04:34]              37.6     141  |  105  |  35  ----------------------------<  142      [12-05-22 @ 04:34]  4.4   |  23  |  2.06        Ca     9.1     [12-05-22 @ 04:34]      Mg     2.2     [12-05-22 @ 04:34]      Phos  4.1     [12-05-22 @ 04:34]    TPro  6.4  /  Alb  3.6  /  TBili  0.2  /  DBili  x   /  AST  23  /  ALT  32  /  AlkPhos  41  [12-05-22 @ 04:34]    PT/INR: PT 13.2 , INR 1.15       [12-05-22 @ 04:34]  PTT: 97.9       [12-05-22 @ 04:34]          [12-05-22 @ 04:34]    Creatinine Trend:  SCr 2.06 [12-05 @ 04:34]  SCr 2.07 [12-04 @ 05:50]  SCr 2.09 [12-03 @ 07:07]  SCr 1.86 [12-02 @ 05:42]  SCr 1.69 [12-01 @ 06:35]    Urinalysis - [12-01-22 @ 00:09]      Color Light Yellow / Appearance Clear / SG 1.017 / pH 6.0      Gluc Negative / Ketone Negative  / Bili Negative / Urobili Negative       Blood Moderate / Protein 100 / Leuk Est Negative / Nitrite Negative      RBC 71 / WBC 1 / Hyaline 1 / Gran  / Sq Epi  / Non Sq Epi 0 / Bacteria Negative      HCV 0.06, Nonreact      [12-02-22 @ 05:42]    Free Light Chains: kappa 4.11, lambda 2.75, ratio = 1.49      [12-04 @ 05:51]   [facility] DIVISION OF KIDNEY DISEASES AND HYPERTENSION -- [phone]  -- INITIAL CONSULT NOTE  --------------------------------------------------------------------------------  HPI:  Attempted to see pt, pt was at nuclear medicine    History from chart:  72M PMHx IDDM (novolog and levemir), HTN, HLD p/w night time seizure like activity with episodes x2-3/last 2 weeks a/w hypoglycemia (Bs 30s-50s) as well as orthopnea and GUILLEN. CK initially 5173. Treated here for afib and HF with home BB and diuresis planning for angiogram pending renal function.     PAST HISTORY  --------------------------------------------------------------------------------  PAST MEDICAL & SURGICAL HISTORY:  Diabetes insipidus  Hypertension  Diabetes  No significant past surgical history    FAMILY HISTORY:  FHx: myocardial infarction (Mother, Sibling)  PAST SOCIAL HISTORY:  ALLERGIES & MEDICATIONS  --------------------------------------------------------------------------------  Allergies  No Known Allergies    Intolerances      Standing Inpatient Medications  aspirin  chewable 81 milliGRAM(s) Oral daily  atorvastatin 80 milliGRAM(s) Oral at bedtime  dextrose 5%. 1000 milliLiter(s) IV Continuous <Continuous>  dextrose 5%. 1000 milliLiter(s) IV Continuous <Continuous>  dextrose 50% Injectable 25 Gram(s) IV Push once  dextrose 50% Injectable 12.5 Gram(s) IV Push once  dextrose 50% Injectable 25 Gram(s) IV Push once  furosemide   Injectable 20 milliGRAM(s) IV Push daily  glucagon  Injectable 1 milliGRAM(s) IntraMuscular once  heparin  Infusion.  Unit(s)/Hr IV Continuous <Continuous>  insulin glargine Injectable (LANTUS) 17 Unit(s) SubCutaneous at bedtime  insulin lispro (ADMELOG) corrective regimen sliding scale   SubCutaneous three times a day before meals  insulin lispro Injectable (ADMELOG) 6 Unit(s) SubCutaneous three times a day before meals  metoprolol succinate  milliGRAM(s) Oral daily    PRN Inpatient Medications  dextrose Oral Gel 15 Gram(s) Oral once PRN      REVIEW OF SYSTEMS  --------------------------------------------------------------------------------  Pt not yet seen    All other systems were reviewed and are negative, except as noted.    VITALS/PHYSICAL EXAM  --------------------------------------------------------------------------------  T(C): 36.6 (12-05-22 @ 10:41), Max: 36.8 (12-04-22 @ 19:30)  HR: 96 (12-05-22 @ 10:41) (65 - 104)  BP: 120/75 (12-05-22 @ 10:41) (120/75 - 127/82)  RR: 18 (12-05-22 @ 10:41) (16 - 20)  SpO2: 98% (12-05-22 @ 10:41) (93% - 98%)  Wt(kg): --        12-04-22 @ 07:01  -  12-05-22 @ 07:00  --------------------------------------------------------  IN: 1060 mL / OUT: 1400 mL / NET: -340 mL        Physical Exam:    Pt not yet seen    LABS/STUDIES  --------------------------------------------------------------------------------              11.8   4.30  >-----------<  217      [12-05-22 @ 04:34]              37.6     141  |  105  |  35  ----------------------------<  142      [12-05-22 @ 04:34]  4.4   |  23  |  2.06        Ca     9.1     [12-05-22 @ 04:34]      Mg     2.2     [12-05-22 @ 04:34]      Phos  4.1     [12-05-22 @ 04:34]    TPro  6.4  /  Alb  3.6  /  TBili  0.2  /  DBili  x   /  AST  23  /  ALT  32  /  AlkPhos  41  [12-05-22 @ 04:34]    PT/INR: PT 13.2 , INR 1.15       [12-05-22 @ 04:34]  PTT: 97.9       [12-05-22 @ 04:34]          [12-05-22 @ 04:34]    Creatinine Trend:  SCr 2.06 [12-05 @ 04:34]  SCr 2.07 [12-04 @ 05:50]  SCr 2.09 [12-03 @ 07:07]  SCr 1.86 [12-02 @ 05:42]  SCr 1.69 [12-01 @ 06:35]    Urinalysis - [12-01-22 @ 00:09]      Color Light Yellow / Appearance Clear / SG 1.017 / pH 6.0      Gluc Negative / Ketone Negative  / Bili Negative / Urobili Negative       Blood Moderate / Protein 100 / Leuk Est Negative / Nitrite Negative      RBC 71 / WBC 1 / Hyaline 1 / Gran  / Sq Epi  / Non Sq Epi 0 / Bacteria Negative      HCV 0.06, Nonreact      [12-02-22 @ 05:42]    Free Light Chains: kappa 4.11, lambda 2.75, ratio = 1.49      [12-04 @ 05:51]   [facility] DIVISION OF KIDNEY DISEASES AND HYPERTENSION -- [phone]  -- INITIAL CONSULT NOTE  --------------------------------------------------------------------------------  HPI:  72M PMHx IDDM (novolog and levemir), HTN, HLD p/w night time seizure like activity with episodes x2-3/last 2 weeks a/w hypoglycemia (Bs 30s-50s) as well as orthopnea and GUILLEN. CK initially 5173. Treated here for afib and HF with home BB and diuresis planning for angiogram pending renal function Pt endorses stable CKD stage 3 and known hematuria for which he was instructed to follow up with urology. Denies current orthopnea and GUILLEN as well as muscle pain. Notes only pain in heel which had gone through a wall during seizure event.    PAST HISTORY  --------------------------------------------------------------------------------  PAST MEDICAL & SURGICAL HISTORY:  Diabetes insipidus  Hypertension  Diabetes  No significant past surgical history    FAMILY HISTORY:  FHx: myocardial infarction (Mother, Sibling)  PAST SOCIAL HISTORY:  ALLERGIES & MEDICATIONS  --------------------------------------------------------------------------------  Allergies  No Known Allergies    Intolerances      Standing Inpatient Medications  aspirin  chewable 81 milliGRAM(s) Oral daily  atorvastatin 80 milliGRAM(s) Oral at bedtime  dextrose 5%. 1000 milliLiter(s) IV Continuous <Continuous>  dextrose 5%. 1000 milliLiter(s) IV Continuous <Continuous>  dextrose 50% Injectable 25 Gram(s) IV Push once  dextrose 50% Injectable 12.5 Gram(s) IV Push once  dextrose 50% Injectable 25 Gram(s) IV Push once  furosemide   Injectable 20 milliGRAM(s) IV Push daily  glucagon  Injectable 1 milliGRAM(s) IntraMuscular once  heparin  Infusion.  Unit(s)/Hr IV Continuous <Continuous>  insulin glargine Injectable (LANTUS) 17 Unit(s) SubCutaneous at bedtime  insulin lispro (ADMELOG) corrective regimen sliding scale   SubCutaneous three times a day before meals  insulin lispro Injectable (ADMELOG) 6 Unit(s) SubCutaneous three times a day before meals  metoprolol succinate  milliGRAM(s) Oral daily    PRN Inpatient Medications  dextrose Oral Gel 15 Gram(s) Oral once PRN      REVIEW OF SYSTEMS  --------------------------------------------------------------------------------  Pt not yet seen    All other systems were reviewed and are negative, except as noted.    VITALS/PHYSICAL EXAM  Gen: Sitting up in bed, NAD, well developed  CV: irregular rhythm no murmurs   Resp: Bibasilar crackles  Extremities: 2+ pitting edema to knee b/l    --------------------------------------------------------------------------------  T(C): 36.6 (12-05-22 @ 10:41), Max: 36.8 (12-04-22 @ 19:30)  HR: 96 (12-05-22 @ 10:41) (65 - 104)  BP: 120/75 (12-05-22 @ 10:41) (120/75 - 127/82)  RR: 18 (12-05-22 @ 10:41) (16 - 20)  SpO2: 98% (12-05-22 @ 10:41) (93% - 98%)  Wt(kg): --        12-04-22 @ 07:01  -  12-05-22 @ 07:00  --------------------------------------------------------  IN: 1060 mL / OUT: 1400 mL / NET: -340 mL        Physical Exam:    Pt not yet seen    LABS/STUDIES  --------------------------------------------------------------------------------              11.8   4.30  >-----------<  217      [12-05-22 @ 04:34]              37.6     141  |  105  |  35  ----------------------------<  142      [12-05-22 @ 04:34]  4.4   |  23  |  2.06        Ca     9.1     [12-05-22 @ 04:34]      Mg     2.2     [12-05-22 @ 04:34]      Phos  4.1     [12-05-22 @ 04:34]    TPro  6.4  /  Alb  3.6  /  TBili  0.2  /  DBili  x   /  AST  23  /  ALT  32  /  AlkPhos  41  [12-05-22 @ 04:34]    PT/INR: PT 13.2 , INR 1.15       [12-05-22 @ 04:34]  PTT: 97.9       [12-05-22 @ 04:34]          [12-05-22 @ 04:34]    Creatinine Trend:  SCr 2.06 [12-05 @ 04:34]  SCr 2.07 [12-04 @ 05:50]  SCr 2.09 [12-03 @ 07:07]  SCr 1.86 [12-02 @ 05:42]  SCr 1.69 [12-01 @ 06:35]    Urinalysis - [12-01-22 @ 00:09]      Color Light Yellow / Appearance Clear / SG 1.017 / pH 6.0      Gluc Negative / Ketone Negative  / Bili Negative / Urobili Negative       Blood Moderate / Protein 100 / Leuk Est Negative / Nitrite Negative      RBC 71 / WBC 1 / Hyaline 1 / Gran  / Sq Epi  / Non Sq Epi 0 / Bacteria Negative      HCV 0.06, Nonreact      [12-02-22 @ 05:42]    Free Light Chains: kappa 4.11, lambda 2.75, ratio = 1.49      [12-04 @ 05:51]

## 2022-12-05 NOTE — PROGRESS NOTE ADULT - PROBLEM SELECTOR PLAN 1
Orthopnea, elevated proBNP, pulmonary edema suggestive of ADHF  -TTE showed HFrEF (EF 39%)  -on room air  -Daily diuretic dosing (s/p Bumex 1 IV today)   -C/w Toprol  -holding ACE-I i/s/o TATI --> consider switching to ARB/ARNI this admission  -cardiology consulted for evaluation of new onset HF  -Angiogram on Monday by Cards; NPO at midnight Orthopnea, elevated proBNP, pulmonary edema suggestive of ADHF  -TTE showed HFrEF (EF 39%)  -on room air  - Card recd IV Lasix 20 standing   -C/w Toprol  -holding ACE-I i/s/o TATI --> consider switching to ARB/ARNI this admission  -cardiology consulted for evaluation of new onset HF  -Angiogram on Monday by Cards; NPO at midnight Orthopnea, elevated proBNP, pulmonary edema suggestive of ADHF  -TTE showed HFrEF (EF 39%)  -on room air  - Card recd IV Lasix 20 standing   -C/w Toprol  -holding ACE-I i/s/o TATI --> consider switching to ARB/ARNI this admission once renal function improves   -cardiology consulted for evaluation of new onset HF  -Pending Angiogram on Monday, NPO Orthopnea, elevated proBNP, pulmonary edema suggestive of ADHF  -TTE showed HFrEF (EF 39%)  -on room air  - Card recd IV Lasix 20 standing   -C/w Toprol  -holding ACE-I i/s/o TATI --> consider switching to ARB/ARNI this admission once renal function improves   - add on SGLT2i for GDMT  -cardiology consulted for evaluation of new onset HF  -Angiogram Monday 12/5 deferred by Cards until further nephro input  [ ] f/u Tc-99m-PYP scan for amyloidosis  [ ] f/u Urine Bence Gutierrez protein

## 2022-12-05 NOTE — DIETITIAN INITIAL EVALUATION ADULT - ORAL INTAKE PTA/DIET HISTORY
PTA per pt  -Intake: good PO intake; consumes x3 meals/day with snacks; attempts to monitor carbohydrate and sodium intake; Family wants Pt to start weight loss diet that would provide only 1200-1500kcal/day - voiced concern   -Chewing/Swallowing: denies difficulty   -Allergies/Intolerances: NKFA

## 2022-12-05 NOTE — PROGRESS NOTE ADULT - ASSESSMENT
The patient is a 73yo male with a PMH of DM, HTN, HLD, hypoglycemic seizures who presented to the ED with dyspnea and orthopnea, admitted with concern for heart failure exacerbation.    1. New onset CHFrEF  2. ?New onset AF     Recommendations:   - CXR 11/30: Cardiac size is within normal limits. Trace right sided pleural effusion  - TTE 12/1: EF 39%, Calcified aortic valve. The non and the left cusp appear functionally fused by calcification. Peak transaortic valve gradient equals 9 mm Hg, estimated aortic valve area equals 2.3 sqcm. Moderate  global left ventricular systolic dysfunction.  - No prior cath   - Troponin 96 --> 91   - BNP 3355  - Initial EKG and telemetry: AF/Aflutter, HRs 90s-100  - Heparin drip for AC given Atrial Fibrillation   - Continue home Metoprolol Succinate 100mg   - Continue diuresis lasix IV 20mg once daily   - Strict I/Os and weights   - Pt will need addition of additional GDMT with improvement in kidney function   -  Tc-99m-PYP to assess for TTR CA (based on LV morphology on TTE)  - Case discussed with Interventional, angiogram once renal function improves, recommend nephrology input    Silvia Orr MD  Cardiology Fellow     Recommendations are preliminary until cosigned by attending    71 yo male with a PMH of DM, HTN, HLD & hypoglycemic seizures.  Presented to the ED with dyspnea and orthopnea and was admitted with concern for heart failure exacerbation.    IMP:  1. New onset CHFrEF  2. ?New onset AF   - CXR 11/30: Cardiac size is within normal limits. Trace right sided pleural effusion  - TTE 12/1: EF 39%, Calcified aortic valve. The non and the left cusp appear functionally fused by calcification. Peak transaortic valve gradient equals 9 mm Hg, estimated aortic valve area equals 2.3 sqcm. Moderate  global left ventricular systolic dysfunction.  - No prior cath   - Troponin 96 --> 91   - BNP 3355  - Initial EKG and telemetry: AF/Aflutter, HRs 90s-100      Recommendations:   - Heparin drip for AC given Atrial Fibrillation   - Continue home metoprolol succinate 100mg   - Continue diuresis Lasix IV 20mg once daily   - Strict I/Os and weights   - Pt will need addition of additional GDMT with improvement in kidney function   - Tc-99m-PYP to assess for TTR CA (based on LV morphology on TTE)  - Case discussed with Interventional, angiogram once renal function improves, recommend nephrology input      Silvia Orr MD  Cardiology Fellow     Plan discussed with cardiology fellow.  Patient seen and examined.  Hx., exam and labs as above.  I agree with the assessment and recommendations, which I have reviewed and edited where appropriate.  Parvez Bustamante M.D.  Cardiology Attending, Consult Service    For Cardiology consults and questions, all Cardiology service information can be found 24/7 on [email or web address] - use password: [email or web address] to log in.

## 2022-12-05 NOTE — CONSULT NOTE ADULT - ATTENDING COMMENTS
Agree with fellow assessment and plan. Long term DM, HTN, CKD. Now, presents with SOB, HFrEF, and AFib. Needs anticoagulation, rate control, HF GDMT as well as further evaluation with cardiac cath.
Stage 3 CKD: Pt. with stage 3 CKD In the setting of DM and nephrolithiasis, admitted for hypervolemia in the setting of CHF exacerbation (concerning for TTR amyloidosis), awaiting cardiac angiography. Pt. made aware that he is at increased risk of developing contrast associated TATI after receiving IV contrast during cardiac cath.  Scr at baseline, and noted to have improving volume status. consider holding lasix on the day of cardiac catheterization.   Monitor BMP. Strict I/Os. Avoid nephrotoxins. Dose meds as per eGFR.     Microhematuria: reports chronic microhematuria.   Repeat UA, check spot urine TP/Cr and urine cytology.

## 2022-12-05 NOTE — PROGRESS NOTE ADULT - PROBLEM SELECTOR PLAN 7
-c/w amlodpine for BP control   -holding ACE-I i/s/o [place]  -consider switching out amlodipine for other GDMT blood pressure control (ARB/ARNI) -c/w amlodpine for BP control   -holding ACE-I i/s/o CKD  -consider switching out amlodipine for other GDMT (SGLT2i)

## 2022-12-05 NOTE — PROGRESS NOTE ADULT - PROBLEM SELECTOR PLAN 2
New A fib, rate mostly contollred  CHADSVASC score of 4  -Started Hep gtt as per Cards  -C/w BB for rate control New A fib, rate mostly contollred  CHADSVASC score of 4  -c/w Hep gtt as per Cards  -C/w BB for rate control

## 2022-12-05 NOTE — DIETITIAN INITIAL EVALUATION ADULT - REASON FOR ADMISSION
73yo Male with PMH of T2DM, HTN. Pt admitted on 12/1 with Shortness of breath and new onset HF.

## 2022-12-05 NOTE — DIETITIAN INITIAL EVALUATION ADULT - ADD RECOMMEND
1) Upon advancement of diet, continue Consistent carbohydrate diet; Add DASH/TLC restriction   2) Monitor PO intake and add PO supplement PRN   3) Reinforce HF/DM education PRN   4) Monitor PO intake, diet tolerance, weight trends, labs, GI function, and skin integrity

## 2022-12-05 NOTE — PROGRESS NOTE ADULT - ATTENDING COMMENTS
72M with T2DM on insulin, HTN, HLD here with possible seizures 2/2 hypoglycemia likely from misunderstanding of insulin regimen, complicated by rhabdomyolysis and with worsening exercise tolerance secondary to possible acute heart failure.     # ADHF  Worsening pitting edema w/ worsening exercise tolerance and paroxysmal nocturnal dyspnea suggestive of heart failure, with elevated BMP  - echo with LVSD, EF 39%. already on ACE (held for tati), can transition to entresto when TATI resolves and after cath; creatinine stable. likely may be his baseline level. will continue to monitor for now.   - strict I&Os  - daily standing weights  - cardiology consult - plan for cath; held currently due to kidney function; may likely be baseline but will have nephro input   - c/w diuresis per cards; IV 20mg lasix daily   - dyspnea symptomatically improving  - r/o amyloid per cards; amyloid scan pending     # Aflutter  - heparin gtt (CHADsVAS at least 4)  - can be discharged on doac if insurance covers  - cards consult    # Rhabdo  Mildly elevated CK i/s/o possible seizures 2/2 hypoglycemia  - Cr improved with 500cc in ED + gentle hydration 12/1  - trend CK til normalization    # TATI on CKD  Elevated Cr i/s/o rhabdo, need to find out baseline (baseline Cr 2 per pcp)  - likely at baseline currently   - Avoid nephrotoxic agents  - Trend Cr daily    # T2DM  On basal/bolus 80BID/45qac per endocrinologist office. However, pt is confused about his exact regimen.  - weight base dose for now given he may not be taking his insulin correctly and he has been hypoglycemic at home  - monitor FS qac qhs   - basal/bolus 17/6 with adequate control  - counseled on carbohydrate control  - consult dietician for education on consistent carb diets at home  - a1c 9.8, lipid panel normal   - better controlled on current insulin regimen     Rest of plan as above. Discussed with HS. 72M with T2DM on insulin, HTN, HLD here with possible seizures 2/2 hypoglycemia likely from misunderstanding of insulin regimen, complicated by rhabdomyolysis and with worsening exercise tolerance secondary to possible acute heart failure.     # ADHF  Worsening pitting edema w/ worsening exercise tolerance and paroxysmal nocturnal dyspnea suggestive of heart failure, with elevated BMP  - echo with LVSD, EF 39%. already on ACE (held for tati), can transition to entresto when TATI resolves and after cath; creatinine stable. likely may be his baseline level. will continue to monitor for now.   - strict I&Os  - daily standing weights  - cardiology consult - plan for cath; held currently due to kidney function; may likely be baseline but will have nephro input   - c/w diuresis per cards; IV 20mg lasix daily   - dyspnea symptomatically improving  - r/o amyloid per cards; amyloid scan pending     # Aflutter  - heparin gtt (CHADsVASC at least 4)  - can be discharged on doac if insurance covers  - cards consult.    # Rhabdo  Mildly elevated CK i/s/o possible seizures 2/2 hypoglycemia  - Cr improved with 500cc in ED + gentle hydration 12/1  - trend CK til normalization    # TATI on CKD  Elevated Cr i/s/o rhabdo, need to find out baseline (baseline Cr 2 per pcp)  - likely at baseline currently   - Avoid nephrotoxic agents  - Trend Cr daily    # T2DM  On basal/bolus 80BID/45qac per endocrinologist office. However, pt is confused about his exact regimen.  - weight base dose for now given he may not be taking his insulin correctly and he has been hypoglycemic at home  - monitor FS qac qhs   - basal/bolus 17/6 with adequate control  - counseled on carbohydrate control  - consult dietician for education on consistent carb diets at home  - a1c 9.8, lipid panel normal   - better controlled on current insulin regimen     Rest of plan as above. Discussed with HS.

## 2022-12-05 NOTE — PROGRESS NOTE ADULT - SUBJECTIVE AND OBJECTIVE BOX
INTERVAL HPI/OVERNIGHT EVENTS:    SUBJECTIVE: Patient seen and examined at bedside.         OBJECTIVE:    VITAL SIGNS:  ICU Vital Signs Last 24 Hrs  T(C): 36.7 (05 Dec 2022 04:18), Max: 36.8 (04 Dec 2022 19:30)  T(F): 98.1 (05 Dec 2022 04:18), Max: 98.2 (04 Dec 2022 19:30)  HR: 78 (05 Dec 2022 04:18) (65 - 108)  BP: 121/77 (05 Dec 2022 04:18) (113/87 - 127/82)  BP(mean): --  ABP: --  ABP(mean): --  RR: 16 (05 Dec 2022 04:18) (16 - 20)  SpO2: 93% (05 Dec 2022 04:18) (93% - 96%)    O2 Parameters below as of 05 Dec 2022 04:18  Patient On (Oxygen Delivery Method): room air            Plateau pressure:   P/F ratio:     12-03 @ 07:01  -  12-04 @ 07:00  --------------------------------------------------------  IN: 860 mL / OUT: 1450 mL / NET: -590 mL    12-04 @ 07:01  -  12-05 @ 06:42  --------------------------------------------------------  IN: 720 mL / OUT: 1400 mL / NET: -680 mL      CAPILLARY BLOOD GLUCOSE      POCT Blood Glucose.: 155 mg/dL (04 Dec 2022 21:11)    ECG:    PHYSICAL EXAM:    General: NAD  HEENT: clear conjunctiva  Neck: supple  Respiratory: CTA b/l  Cardiovascular: +S1/S2; RRR  Abdomen: soft, NT/ND; +BS x4  Extremities: 2+ peripheral pulses b/l; 2+ pitting edema  Skin: normal color and turgor; no rash  Neurological:    MEDICATIONS:  MEDICATIONS  (STANDING):  aspirin  chewable 81 milliGRAM(s) Oral daily  atorvastatin 80 milliGRAM(s) Oral at bedtime  dextrose 5%. 1000 milliLiter(s) (100 mL/Hr) IV Continuous <Continuous>  dextrose 5%. 1000 milliLiter(s) (50 mL/Hr) IV Continuous <Continuous>  dextrose 50% Injectable 25 Gram(s) IV Push once  dextrose 50% Injectable 12.5 Gram(s) IV Push once  dextrose 50% Injectable 25 Gram(s) IV Push once  glucagon  Injectable 1 milliGRAM(s) IntraMuscular once  heparin  Infusion.  Unit(s)/Hr (20 mL/Hr) IV Continuous <Continuous>  insulin glargine Injectable (LANTUS) 12 Unit(s) SubCutaneous at bedtime  insulin lispro (ADMELOG) corrective regimen sliding scale   SubCutaneous three times a day before meals  insulin lispro Injectable (ADMELOG) 6 Unit(s) SubCutaneous three times a day before meals  metoprolol succinate  milliGRAM(s) Oral daily    MEDICATIONS  (PRN):  dextrose Oral Gel 15 Gram(s) Oral once PRN Blood Glucose LESS THAN 70 milliGRAM(s)/deciliter      LABS:                        12.3   4.88  )-----------( 235      ( 04 Dec 2022 05:49 )             38.6     12-05    141  |  105  |  x   ----------------------------<  x   4.4   |  x   |  x     Ca    9.3      04 Dec 2022 05:50  Phos  4.3     12-04  Mg     2.1     12-04    TPro  6.5  /  Alb  3.8  /  TBili  0.3  /  DBili  x   /  AST  38  /  ALT  36  /  AlkPhos  45  12-03    PT/INR - ( 05 Dec 2022 04:34 )   PT: 13.2 sec;   INR: 1.15 ratio         PTT - ( 05 Dec 2022 04:34 )  PTT:97.9 sec      RADIOLOGY & ADDITIONAL TESTS: Reviewed.     INTERVAL HPI/OVERNIGHT EVENTS:    SUBJECTIVE: Patient seen and examined at bedside. Patient AOx3, NPO for Cath today.         OBJECTIVE:    VITAL SIGNS:  ICU Vital Signs Last 24 Hrs  T(C): 36.7 (05 Dec 2022 04:18), Max: 36.8 (04 Dec 2022 19:30)  T(F): 98.1 (05 Dec 2022 04:18), Max: 98.2 (04 Dec 2022 19:30)  HR: 78 (05 Dec 2022 04:18) (65 - 108)  BP: 121/77 (05 Dec 2022 04:18) (113/87 - 127/82)  BP(mean): --  ABP: --  ABP(mean): --  RR: 16 (05 Dec 2022 04:18) (16 - 20)  SpO2: 93% (05 Dec 2022 04:18) (93% - 96%)    O2 Parameters below as of 05 Dec 2022 04:18  Patient On (Oxygen Delivery Method): room air            Plateau pressure:   P/F ratio:     12-03 @ 07:01  -  12-04 @ 07:00  --------------------------------------------------------  IN: 860 mL / OUT: 1450 mL / NET: -590 mL    12-04 @ 07:01  -  12-05 @ 06:42  --------------------------------------------------------  IN: 720 mL / OUT: 1400 mL / NET: -680 mL      CAPILLARY BLOOD GLUCOSE      POCT Blood Glucose.: 155 mg/dL (04 Dec 2022 21:11)    ECG: Afib 90-110s On with 7 beats of wide-complex tachycardia 168, 2.4 seconds    PHYSICAL EXAM:    General: NAD  HEENT: clear conjunctiva  Neck: supple  Respiratory: CTA b/l  Cardiovascular: +S1/S2; IRR  Abdomen: soft, NT/ND; +BS x4  Extremities: 2+ peripheral pulses b/l; 2+ pitting edema  Skin: normal color and turgor; no rash  Neurological: AOx3    MEDICATIONS:  MEDICATIONS  (STANDING):  aspirin  chewable 81 milliGRAM(s) Oral daily  atorvastatin 80 milliGRAM(s) Oral at bedtime  dextrose 5%. 1000 milliLiter(s) (100 mL/Hr) IV Continuous <Continuous>  dextrose 5%. 1000 milliLiter(s) (50 mL/Hr) IV Continuous <Continuous>  dextrose 50% Injectable 25 Gram(s) IV Push once  dextrose 50% Injectable 12.5 Gram(s) IV Push once  dextrose 50% Injectable 25 Gram(s) IV Push once  glucagon  Injectable 1 milliGRAM(s) IntraMuscular once  heparin  Infusion.  Unit(s)/Hr (20 mL/Hr) IV Continuous <Continuous>  insulin glargine Injectable (LANTUS) 12 Unit(s) SubCutaneous at bedtime  insulin lispro (ADMELOG) corrective regimen sliding scale   SubCutaneous three times a day before meals  insulin lispro Injectable (ADMELOG) 6 Unit(s) SubCutaneous three times a day before meals  metoprolol succinate  milliGRAM(s) Oral daily    MEDICATIONS  (PRN):  dextrose Oral Gel 15 Gram(s) Oral once PRN Blood Glucose LESS THAN 70 milliGRAM(s)/deciliter      LABS:                        12.3   4.88  )-----------( 235      ( 04 Dec 2022 05:49 )             38.6     12-05    141  |  105  |  x   ----------------------------<  x   4.4   |  x   |  x     Ca    9.3      04 Dec 2022 05:50  Phos  4.3     12-04  Mg     2.1     12-04    TPro  6.5  /  Alb  3.8  /  TBili  0.3  /  DBili  x   /  AST  38  /  ALT  36  /  AlkPhos  45  12-03    PT/INR - ( 05 Dec 2022 04:34 )   PT: 13.2 sec;   INR: 1.15 ratio         PTT - ( 05 Dec 2022 04:34 )  PTT:97.9 sec      RADIOLOGY & ADDITIONAL TESTS: Reviewed.     INTERVAL HPI/OVERNIGHT EVENTS:    SUBJECTIVE: Patient seen and examined at bedside. Patient AOx3, Cath deferred today by Cards until further nephro input.         OBJECTIVE:    VITAL SIGNS:  ICU Vital Signs Last 24 Hrs  T(C): 36.7 (05 Dec 2022 04:18), Max: 36.8 (04 Dec 2022 19:30)  T(F): 98.1 (05 Dec 2022 04:18), Max: 98.2 (04 Dec 2022 19:30)  HR: 78 (05 Dec 2022 04:18) (65 - 108)  BP: 121/77 (05 Dec 2022 04:18) (113/87 - 127/82)  BP(mean): --  ABP: --  ABP(mean): --  RR: 16 (05 Dec 2022 04:18) (16 - 20)  SpO2: 93% (05 Dec 2022 04:18) (93% - 96%)    O2 Parameters below as of 05 Dec 2022 04:18  Patient On (Oxygen Delivery Method): room air            Plateau pressure:   P/F ratio:     12-03 @ 07:01  -  12-04 @ 07:00  --------------------------------------------------------  IN: 860 mL / OUT: 1450 mL / NET: -590 mL    12-04 @ 07:01  -  12-05 @ 06:42  --------------------------------------------------------  IN: 720 mL / OUT: 1400 mL / NET: -680 mL      CAPILLARY BLOOD GLUCOSE      POCT Blood Glucose.: 155 mg/dL (04 Dec 2022 21:11)    ECG: Afib 90-110s On with 7 beats of wide-complex tachycardia 168, 2.4 seconds    PHYSICAL EXAM:    General: NAD  HEENT: clear conjunctiva  Neck: supple  Respiratory: CTA b/l  Cardiovascular: +S1/S2; IRR  Abdomen: soft, NT/ND; +BS x4  Extremities: 2+ peripheral pulses b/l; 2+ pitting edema  Skin: normal color and turgor; no rash  Neurological: AOx3    MEDICATIONS:  MEDICATIONS  (STANDING):  aspirin  chewable 81 milliGRAM(s) Oral daily  atorvastatin 80 milliGRAM(s) Oral at bedtime  dextrose 5%. 1000 milliLiter(s) (100 mL/Hr) IV Continuous <Continuous>  dextrose 5%. 1000 milliLiter(s) (50 mL/Hr) IV Continuous <Continuous>  dextrose 50% Injectable 25 Gram(s) IV Push once  dextrose 50% Injectable 12.5 Gram(s) IV Push once  dextrose 50% Injectable 25 Gram(s) IV Push once  glucagon  Injectable 1 milliGRAM(s) IntraMuscular once  heparin  Infusion.  Unit(s)/Hr (20 mL/Hr) IV Continuous <Continuous>  insulin glargine Injectable (LANTUS) 12 Unit(s) SubCutaneous at bedtime  insulin lispro (ADMELOG) corrective regimen sliding scale   SubCutaneous three times a day before meals  insulin lispro Injectable (ADMELOG) 6 Unit(s) SubCutaneous three times a day before meals  metoprolol succinate  milliGRAM(s) Oral daily    MEDICATIONS  (PRN):  dextrose Oral Gel 15 Gram(s) Oral once PRN Blood Glucose LESS THAN 70 milliGRAM(s)/deciliter      LABS:                        12.3   4.88  )-----------( 235      ( 04 Dec 2022 05:49 )             38.6     12-05    141  |  105  |  x   ----------------------------<  x   4.4   |  x   |  x     Ca    9.3      04 Dec 2022 05:50  Phos  4.3     12-04  Mg     2.1     12-04    TPro  6.5  /  Alb  3.8  /  TBili  0.3  /  DBili  x   /  AST  38  /  ALT  36  /  AlkPhos  45  12-03    PT/INR - ( 05 Dec 2022 04:34 )   PT: 13.2 sec;   INR: 1.15 ratio         PTT - ( 05 Dec 2022 04:34 )  PTT:97.9 sec      RADIOLOGY & ADDITIONAL TESTS: Reviewed.

## 2022-12-05 NOTE — DIETITIAN INITIAL EVALUATION ADULT - PERTINENT LABORATORY DATA
12-05    141  |  105  |  35<H>  ----------------------------<  142<H>  4.4   |  23  |  2.06<H>    Ca    9.1      05 Dec 2022 04:34  Phos  4.1     12-05  Mg     2.2     12-05    TPro  6.4  /  Alb  3.6  /  TBili  0.2  /  DBili  x   /  AST  23  /  ALT  32  /  AlkPhos  41  12-05  POCT Blood Glucose.: 149 mg/dL (12-05-22 @ 08:25)  A1C with Estimated Average Glucose Result: 9.7 % (12-02-22 @ 05:41)

## 2022-12-05 NOTE — DIETITIAN INITIAL EVALUATION ADULT - PERTINENT MEDS FT
MEDICATIONS  (STANDING):  aspirin  chewable 81 milliGRAM(s) Oral daily  atorvastatin 80 milliGRAM(s) Oral at bedtime  dextrose 5%. 1000 milliLiter(s) (100 mL/Hr) IV Continuous <Continuous>  dextrose 5%. 1000 milliLiter(s) (50 mL/Hr) IV Continuous <Continuous>  dextrose 50% Injectable 25 Gram(s) IV Push once  dextrose 50% Injectable 12.5 Gram(s) IV Push once  dextrose 50% Injectable 25 Gram(s) IV Push once  furosemide   Injectable 20 milliGRAM(s) IV Push daily  glucagon  Injectable 1 milliGRAM(s) IntraMuscular once  heparin  Infusion.  Unit(s)/Hr (20 mL/Hr) IV Continuous <Continuous>  insulin glargine Injectable (LANTUS) 12 Unit(s) SubCutaneous at bedtime  insulin lispro (ADMELOG) corrective regimen sliding scale   SubCutaneous three times a day before meals  insulin lispro Injectable (ADMELOG) 6 Unit(s) SubCutaneous three times a day before meals  metoprolol succinate  milliGRAM(s) Oral daily    MEDICATIONS  (PRN):  dextrose Oral Gel 15 Gram(s) Oral once PRN Blood Glucose LESS THAN 70 milliGRAM(s)/deciliter

## 2022-12-05 NOTE — PROGRESS NOTE ADULT - PROBLEM SELECTOR PLAN 4
Per endocrinologist, patient on 80 BID levemir and 45 TID novolog with meals  Unclear compliance per patient history; concern for over medication resulting in hypoglycemic episodes  -started with weight base dosing for insulin (0.3 mg/kg/day) (17 units at bedtime, 6 units premeal)  -low dose ISS (only required 1 additional unit yesterday)  -adjust Insulin as needed based on sliding scale requirements Per endocrinologist, patient on 80 BID levemir and 45 TID novolog with meals  Unclear compliance per patient history; concern for over medication resulting in hypoglycemic episodes  -started with weight base dosing for insulin (0.3 mg/kg/day) (17 units at bedtime, 6 units premeal)  -low dose ISS (only required 1 additional unit yesterday)  -adjust Insulin as needed based on sliding scale requirements  - Insulin education on discharge

## 2022-12-05 NOTE — CONSULT NOTE ADULT - ASSESSMENT
72M PMHx IDDM (novolog and levemir), HTN, HLD p/w night time seizure like activity with episodes x2-3/last 2 weeks a/w hypoglycemia (Bs 30s-50s) as well as orthopnea and GUILLEN. CK initially 5173. Renal consulted for TATI      Impression: TATI 2/2 Rhabo but unclear Cr baseline    Plan not yet discussed with fellow or attending  patient not yet examined as he is at nuclear medicine study    #TATI  Cr Baseline: unclear. was 2.4 in 2018 iso of nephrolithiasis. Allscripts reviewed without more lab information.  Cr here: Min 1.69, max 2.09, most recent 2.06  UA + Blood with 71 RBCs  pt with hx nephrolithiasis  pbnp 3355   on lasix 20mg IVP qd  Unclear if true TATI or if baseline CKD. If true TATI, suspect 2/2 rhabdo  -Please obtain urine lytes  -Avoid nephrotoxic agents  -Trend BMPs  -Continue to trend CK  -Encourage PO, IVF if made NPO for any angiogram/other procedures. Also reccomend additional IVF before and after any angiogram or other contrast studies   72M PMHx IDDM (novolog and levemir), HTN, HLD p/w night time seizure like activity with episodes x2-3/last 2 weeks a/w hypoglycemia (Bs 30s-50s) as well as orthopnea and GUILLEN. CK initially 5173. Renal consulted for TATI with plans for angiogram      Impression: TATI 2/2 Rhabo but unclear Cr baseline, may be stable chronic CKD     Plan not yet discussed with fellow or attending  patient not yet examined as he is at nuclear medicine study    #TATI  Cr Baseline: unclear. was 2.4 in 2018 iso of nephrolithiasis. Allscripts reviewed without more lab information.  Cr here: Min 1.69, max 2.09, most recent 2.06  UA + Blood with 71 RBCs  pt with hx nephrolithiasis a/w hydro  pbnp 3355; on lasix 20mg IVP qd  Normal total protein; RADHA lambda 2.75, RADHA jappa 4.11, K/Cardenas FLCR: 1.49  Unclear if true TATI or if baseline CKD. If true TATI, suspect 2/2 rhabdo. If CKD, would likely be stage IIIb  -Please obtain urine lytes  -Avoid nephrotoxic agents  -Trend BMPs  -Continue to trend CK  -Encourage PO, IVF if made NPO for any angiogram/other procedures. Also recommend additional IVF before and after any angiogram or other contrast studies   72M PMHx IDDM (novolog and levemir), HTN, HLD p/w night time seizure like activity with episodes x2-3/last 2 weeks a/w hypoglycemia (Bs 30s-50s) as well as orthopnea and GUILLEN. CK initially 5173. Renal consulted for TATI with plans for angiogram      Impression: TATI 2/2 Rhabo but unclear Cr baseline, may be stable chronic CKD     Plan discussed with attending Dr. Campos    #CKD  Cr Baseline: unclear. was 2.4 in 2018 iso of nephrolithiasis. Allscripts reviewed without more lab information.  Cr here: Min 1.69, max 2.09, most recent 2.06  UA + Blood with 71 RBCs  pt with hx nephrolithiasis a/w hydro  pbnp 3355; on lasix 20mg IVP qd  Normal total protein; RADHA lambda 2.75, RADHA jappa 4.11, K/Cardenas FLCR: 1.49  Per pt has CKD stage 3 but unknown baseline cr. Current lab values c/w stage 3 CKD. Perhaps TATI on CKD iso recent but resolving rhabdo, CHF, and diuresis   In setting of CKD, kappa and lamda levels are less useful and ratio is more telling, his is wnl.   -Avoid nephrotoxic agents  -Trend BMPs  -Continue to trend CK  - if angiogram is still necessary given positive nuclear medicine study, Would recommend fluids (1cc/kg/hr) 6 hours before and after angiogram if okay from cardiac standpoint. Pt seems still volume up so may not need fluids. If not clear for fluids, would recommend holding diuresis on day of procedure.    #Hematuria  per pt has and issue before and told f/u urology  -repeat UA, encourage urology follow up   72M PMHx IDDM (novolog and levemir), HTN, HLD p/w night time seizure like activity with episodes x2-3/last 2 weeks a/w hypoglycemia (Bs 30s-50s) as well as orthopnea and GUILLEN. CK initially 5173. Renal consulted for TATI with plans for angiogram      Impression: TATI 2/2 Rhabo but unclear Cr baseline, may be stable chronic CKD     Plan discussed with attending Dr. Campos    #CKD  Cr Baseline: unclear. was 2.4 in 2018 iso of nephrolithiasis. Allscripts reviewed without more lab information.  Cr here: Min 1.69, max 2.09, most recent 2.06  UA + Blood with 71 RBCs  pt with hx nephrolithiasis a/w hydro  pbnp 3355; on lasix 20mg IVP qd  Normal total protein; RADHA lambda 2.75, RADHA jappa 4.11, K/Cardenas FLCR: 1.49  Per pt has CKD stage 3 but unknown baseline cr. Current lab values c/w stage 3 CKD. Perhaps TATI on CKD iso recent but resolving rhabdo, CHF, and diuresis   In setting of CKD, kappa and lamda levels are less useful and ratio is more telling, his is wnl.   -Avoid nephrotoxic agents  -Trend BMPs  -Continue to trend CK  - if angiogram is still necessary given positive nuclear medicine study, Would recommend fluids (1cc/kg/hr) 6 hours before and after angiogram if okay from cardiac standpoint. Pt seems still volume up so may not need fluids. If not clear for fluids, would recommend holding diuresis on day of procedure.    #MicroHematuria  per pt has and issue before and told f/u urology  -repeat UA, encourage urology follow up

## 2022-12-05 NOTE — PROGRESS NOTE ADULT - SUBJECTIVE AND OBJECTIVE BOX
Overnight Events: Telemetry, AF with HRs in the 90s-110, increase to 150 briefly, 7 beats of WCT yesterday afternoon    Review Of Systems: No chest pain, shortness of breath, or palpitations            Current Meds:  aspirin  chewable 81 milliGRAM(s) Oral daily  atorvastatin 80 milliGRAM(s) Oral at bedtime  dextrose 5%. 1000 milliLiter(s) IV Continuous <Continuous>  dextrose 5%. 1000 milliLiter(s) IV Continuous <Continuous>  dextrose 50% Injectable 25 Gram(s) IV Push once  dextrose 50% Injectable 12.5 Gram(s) IV Push once  dextrose 50% Injectable 25 Gram(s) IV Push once  dextrose Oral Gel 15 Gram(s) Oral once PRN  furosemide   Injectable 20 milliGRAM(s) IV Push daily  glucagon  Injectable 1 milliGRAM(s) IntraMuscular once  heparin  Infusion.  Unit(s)/Hr IV Continuous <Continuous>  insulin glargine Injectable (LANTUS) 12 Unit(s) SubCutaneous at bedtime  insulin lispro (ADMELOG) corrective regimen sliding scale   SubCutaneous three times a day before meals  insulin lispro Injectable (ADMELOG) 6 Unit(s) SubCutaneous three times a day before meals  metoprolol succinate  milliGRAM(s) Oral daily      Vitals:  T(F): 98.1 (12-05), Max: 98.2 (12-04)  HR: 78 (12-05) (65 - 108)  BP: 121/77 (12-05) (113/87 - 127/82)  RR: 16 (12-05)  SpO2: 93% (12-05)  I&O's Summary    04 Dec 2022 07:01  -  05 Dec 2022 07:00  --------------------------------------------------------  IN: 1060 mL / OUT: 1400 mL / NET: -340 mL        Physical Exam:    Appearance: No acute distress; well appearing  Eyes: pink conjunctiva  HEENT: Normal oral mucosa  Cardiovascular: Irregular rhythm, normal rate, normal S1, S2, no murmurs, rubs, or gallops; + edema, unable to appreciate JVD   Respiratory: Clear to auscultation bilaterally  Gastrointestinal: soft, non-tender, non-distended   Musculoskeletal: No clubbing; no joint deformity   Neurologic: Normal speech, no facial asymmetry  Psychiatry: AAOx3, mood & affect appropriate  Skin: No rashes, ecchymoses, or cyanosis of exposed skin                             11.8   4.30  )-----------( 217      ( 05 Dec 2022 04:34 )             37.6     12-05    141  |  105  |  35<H>  ----------------------------<  142<H>  4.4   |  23  |  2.06<H>    Ca    9.1      05 Dec 2022 04:34  Phos  4.1     12-05  Mg     2.2     12-05    TPro  6.4  /  Alb  3.6  /  TBili  0.2  /  DBili  x   /  AST  23  /  ALT  32  /  AlkPhos  41  12-05    PT/INR - ( 05 Dec 2022 04:34 )   PT: 13.2 sec;   INR: 1.15 ratio         PTT - ( 05 Dec 2022 04:34 )  PTT:97.9 sec  CARDIAC MARKERS ( 05 Dec 2022 04:34 )  x     / x     / x     / 398 U/L / x     / x      CARDIAC MARKERS ( 04 Dec 2022 05:50 )  x     / x     / x     / 781 U/L / x     / x      CARDIAC MARKERS ( 03 Dec 2022 07:07 )  x     / x     / x     / 1277 U/L / x     / x      CARDIAC MARKERS ( 02 Dec 2022 05:42 )  x     / x     / x     / 1921 U/L / x     / x      CARDIAC MARKERS ( 01 Dec 2022 06:35 )  x     / x     / x     / 3256 U/L / x     / 7.7 ng/mL      Serum Pro-Brain Natriuretic Peptide: 3355 pg/mL (11-30 @ 22:14)               Overnight Events: Telemetry, AF with HRs in the 90s-110, increase to 150 briefly, 7 beats of WCT yesterday afternoon  No chest pain, shortness of breath, or palpitations            Current Meds:  aspirin  chewable 81 milliGRAM(s) Oral daily  atorvastatin 80 milliGRAM(s) Oral at bedtime  dextrose 5%. 1000 milliLiter(s) IV Continuous <Continuous>  dextrose 5%. 1000 milliLiter(s) IV Continuous <Continuous>  dextrose 50% Injectable 25 Gram(s) IV Push once  dextrose 50% Injectable 12.5 Gram(s) IV Push once  dextrose 50% Injectable 25 Gram(s) IV Push once  dextrose Oral Gel 15 Gram(s) Oral once PRN  furosemide   Injectable 20 milliGRAM(s) IV Push daily  glucagon  Injectable 1 milliGRAM(s) IntraMuscular once  heparin  Infusion.  Unit(s)/Hr IV Continuous <Continuous>  insulin glargine Injectable (LANTUS) 12 Unit(s) SubCutaneous at bedtime  insulin lispro (ADMELOG) corrective regimen sliding scale   SubCutaneous three times a day before meals  insulin lispro Injectable (ADMELOG) 6 Unit(s) SubCutaneous three times a day before meals  metoprolol succinate  milliGRAM(s) Oral daily    ROS:  Negative      Vitals:  T(F): 98.1 (12-05), Max: 98.2 (12-04)  HR: 78 (12-05) (65 - 108)  BP: 121/77 (12-05) (113/87 - 127/82)  RR: 16 (12-05)  SpO2: 93% (12-05)    Physical Exam:  Appearance: No acute distress; well appearing  Eyes: pink conjunctiva  HEENT: Normal oral mucosa  Cardiovascular: Irregular rhythm, normal rate, normal S1, S2, no murmurs, rubs, or gallops; + edema, unable to appreciate JVD   Respiratory: Clear to auscultation bilaterally  Gastrointestinal: soft, non-tender, non-distended   Musculoskeletal: No clubbing; no joint deformity   Neurologic: Normal speech, no facial asymmetry  Psychiatry: AAOx3, mood & affect appropriate  Skin: No rashes, ecchymoses, or cyanosis of exposed skin       I&O's Summary  04 Dec 2022 07:01  -  05 Dec 2022 07:00  --------------------------------------------------------  IN: 1060 mL / OUT: 1400 mL / NET: -340 mL        LABS:                      11.8   4.30  )-----------( 217      ( 05 Dec 2022 04:34 )             37.6     12-05  141  |  105  |  35<H>  ----------------------------<  142<H>  4.4   |  23  |  2.06<H>    Ca    9.1      05 Dec 2022 04:34  Phos  4.1     12-05  Mg     2.2     12-05    TPro  6.4  /  Alb  3.6  /  TBili  0.2  /  DBili  x   /  AST  23  /  ALT  32  /  AlkPhos  41  12-05    PT/INR - ( 05 Dec 2022 04:34 )   PT: 13.2 sec;   INR: 1.15 ratio    PTT - ( 05 Dec 2022 04:34 )  PTT:97.9 sec    CARDIAC MARKERS ( 05 Dec 2022 04:34 )  x     / x     / x     / 398 U/L / x     / x      CARDIAC MARKERS ( 04 Dec 2022 05:50 )  x     / x     / x     / 781 U/L / x     / x      CARDIAC MARKERS ( 03 Dec 2022 07:07 )  x     / x     / x     / 1277 U/L / x     / x      CARDIAC MARKERS ( 02 Dec 2022 05:42 )  x     / x     / x     / 1921 U/L / x     / x      CARDIAC MARKERS ( 01 Dec 2022 06:35 )  x     / x     / x     / 3256 U/L / x     / 7.7 ng/mL    Serum Pro-Brain Natriuretic Peptide: 3355 pg/mL (11-30 @ 22:14)

## 2022-12-06 LAB
ALBUMIN SERPL ELPH-MCNC: 3.6 G/DL — SIGNIFICANT CHANGE UP (ref 3.3–5)
ALP SERPL-CCNC: 40 U/L — SIGNIFICANT CHANGE UP (ref 40–120)
ALT FLD-CCNC: 32 U/L — SIGNIFICANT CHANGE UP (ref 10–45)
ANION GAP SERPL CALC-SCNC: 12 MMOL/L — SIGNIFICANT CHANGE UP (ref 5–17)
APTT BLD: 97.9 SEC — HIGH (ref 27.5–35.5)
AST SERPL-CCNC: 27 U/L — SIGNIFICANT CHANGE UP (ref 10–40)
BASOPHILS # BLD AUTO: 0.02 K/UL — SIGNIFICANT CHANGE UP (ref 0–0.2)
BASOPHILS NFR BLD AUTO: 0.5 % — SIGNIFICANT CHANGE UP (ref 0–2)
BILIRUB SERPL-MCNC: 0.2 MG/DL — SIGNIFICANT CHANGE UP (ref 0.2–1.2)
BUN SERPL-MCNC: 38 MG/DL — HIGH (ref 7–23)
CALCIUM SERPL-MCNC: 9.3 MG/DL — SIGNIFICANT CHANGE UP (ref 8.4–10.5)
CHLORIDE SERPL-SCNC: 106 MMOL/L — SIGNIFICANT CHANGE UP (ref 96–108)
CK SERPL-CCNC: 252 U/L — HIGH (ref 30–200)
CO2 SERPL-SCNC: 24 MMOL/L — SIGNIFICANT CHANGE UP (ref 22–31)
CREAT SERPL-MCNC: 2.13 MG/DL — HIGH (ref 0.5–1.3)
EGFR: 33 ML/MIN/1.73M2 — LOW
EOSINOPHIL # BLD AUTO: 0.14 K/UL — SIGNIFICANT CHANGE UP (ref 0–0.5)
EOSINOPHIL NFR BLD AUTO: 3.7 % — SIGNIFICANT CHANGE UP (ref 0–6)
GLUCOSE BLDC GLUCOMTR-MCNC: 137 MG/DL — HIGH (ref 70–99)
GLUCOSE BLDC GLUCOMTR-MCNC: 169 MG/DL — HIGH (ref 70–99)
GLUCOSE BLDC GLUCOMTR-MCNC: 224 MG/DL — HIGH (ref 70–99)
GLUCOSE BLDC GLUCOMTR-MCNC: 94 MG/DL — SIGNIFICANT CHANGE UP (ref 70–99)
GLUCOSE SERPL-MCNC: 123 MG/DL — HIGH (ref 70–99)
HCT VFR BLD CALC: 36.4 % — LOW (ref 39–50)
HGB BLD-MCNC: 11.5 G/DL — LOW (ref 13–17)
IMM GRANULOCYTES NFR BLD AUTO: 0.3 % — SIGNIFICANT CHANGE UP (ref 0–0.9)
INR BLD: 1.13 RATIO — SIGNIFICANT CHANGE UP (ref 0.88–1.16)
LYMPHOCYTES # BLD AUTO: 1.43 K/UL — SIGNIFICANT CHANGE UP (ref 1–3.3)
LYMPHOCYTES # BLD AUTO: 37.4 % — SIGNIFICANT CHANGE UP (ref 13–44)
MAGNESIUM SERPL-MCNC: 2.2 MG/DL — SIGNIFICANT CHANGE UP (ref 1.6–2.6)
MCHC RBC-ENTMCNC: 29.4 PG — SIGNIFICANT CHANGE UP (ref 27–34)
MCHC RBC-ENTMCNC: 31.6 GM/DL — LOW (ref 32–36)
MCV RBC AUTO: 93.1 FL — SIGNIFICANT CHANGE UP (ref 80–100)
MONOCYTES # BLD AUTO: 0.39 K/UL — SIGNIFICANT CHANGE UP (ref 0–0.9)
MONOCYTES NFR BLD AUTO: 10.2 % — SIGNIFICANT CHANGE UP (ref 2–14)
NEUTROPHILS # BLD AUTO: 1.83 K/UL — SIGNIFICANT CHANGE UP (ref 1.8–7.4)
NEUTROPHILS NFR BLD AUTO: 47.9 % — SIGNIFICANT CHANGE UP (ref 43–77)
NRBC # BLD: 0 /100 WBCS — SIGNIFICANT CHANGE UP (ref 0–0)
PHOSPHATE SERPL-MCNC: 3.6 MG/DL — SIGNIFICANT CHANGE UP (ref 2.5–4.5)
PLATELET # BLD AUTO: 211 K/UL — SIGNIFICANT CHANGE UP (ref 150–400)
POTASSIUM SERPL-MCNC: 4.5 MMOL/L — SIGNIFICANT CHANGE UP (ref 3.5–5.3)
POTASSIUM SERPL-SCNC: 4.5 MMOL/L — SIGNIFICANT CHANGE UP (ref 3.5–5.3)
PROT SERPL-MCNC: 6.4 G/DL — SIGNIFICANT CHANGE UP (ref 6–8.3)
PROTHROM AB SERPL-ACNC: 13.1 SEC — SIGNIFICANT CHANGE UP (ref 10.5–13.4)
RBC # BLD: 3.91 M/UL — LOW (ref 4.2–5.8)
RBC # FLD: 13.8 % — SIGNIFICANT CHANGE UP (ref 10.3–14.5)
SODIUM SERPL-SCNC: 142 MMOL/L — SIGNIFICANT CHANGE UP (ref 135–145)
TSH SERPL-MCNC: 2.19 UIU/ML — SIGNIFICANT CHANGE UP (ref 0.27–4.2)
WBC # BLD: 3.82 K/UL — SIGNIFICANT CHANGE UP (ref 3.8–10.5)
WBC # FLD AUTO: 3.82 K/UL — SIGNIFICANT CHANGE UP (ref 3.8–10.5)

## 2022-12-06 PROCEDURE — 99233 SBSQ HOSP IP/OBS HIGH 50: CPT | Mod: GC

## 2022-12-06 PROCEDURE — 99232 SBSQ HOSP IP/OBS MODERATE 35: CPT | Mod: GC

## 2022-12-06 RX ADMIN — Medication 6 UNIT(S): at 17:11

## 2022-12-06 RX ADMIN — Medication 6 UNIT(S): at 08:25

## 2022-12-06 RX ADMIN — Medication 100 MILLIGRAM(S): at 05:06

## 2022-12-06 RX ADMIN — Medication 81 MILLIGRAM(S): at 11:05

## 2022-12-06 RX ADMIN — ATORVASTATIN CALCIUM 80 MILLIGRAM(S): 80 TABLET, FILM COATED ORAL at 21:08

## 2022-12-06 RX ADMIN — Medication 1: at 11:33

## 2022-12-06 RX ADMIN — INSULIN GLARGINE 17 UNIT(S): 100 INJECTION, SOLUTION SUBCUTANEOUS at 21:00

## 2022-12-06 RX ADMIN — Medication 6 UNIT(S): at 11:34

## 2022-12-06 RX ADMIN — HEPARIN SODIUM 2000 UNIT(S)/HR: 5000 INJECTION INTRAVENOUS; SUBCUTANEOUS at 07:53

## 2022-12-06 NOTE — PROGRESS NOTE ADULT - ASSESSMENT
71 y/o M PMH IDDM, HTN, HLD p/w SOB and night time "seizures" with associated hypoglycemia. SOB with associated elevated proBNP, orthopnea, and pleural effusion found to have ADHF (EF 39%) and poor insulin use at home. Currently pending cath by cardiology for new onset HF.

## 2022-12-06 NOTE — PROGRESS NOTE ADULT - PROBLEM SELECTOR PLAN 1
Orthopnea, elevated proBNP, pulmonary edema suggestive of ADHF  -TTE showed HFrEF (EF 39%)  -on room air  - Card recd IV Lasix 20 standing   -C/w Toprol  -holding ACE-I i/s/o TATI --> consider switching to ARB/ARNI this admission once renal function improves   - add on SGLT2i for GDMT  -cardiology consulted for evaluation of new onset HF  -Angiogram Monday 12/5 deferred by Cards until further nephro input  [ ] f/u Tc-99m-PYP scan for amyloidosis  [ ] f/u Urine Bence Gutierrez protein Orthopnea, elevated proBNP, pulmonary edema suggestive of ADHF  -TTE showed HFrEF (EF 39%)  -on room air  - Card recd IV Lasix 20 standing (held 12/6)  -C/w Toprol  -holding ACE-I i/s/o TATI --> consider switching to ARB/ARNI this admission once renal function improves   - add on SGLT2i for GDMT  -cardiology consulted for evaluation of new onset HF  -Angiogram Monday 12/5 deferred by Cards until further nephro input  - Tc-99m-PYP scan for amyloidosis- strongly suggestive  - Kappa/Thom ratio wnl   - Cardio still waiting for downtrending Cr prior to cath

## 2022-12-06 NOTE — PROGRESS NOTE ADULT - SUBJECTIVE AND OBJECTIVE BOX
Overnight Events: None     Review Of Systems: No chest pain, shortness of breath, or palpitations            Current Meds:  aspirin  chewable 81 milliGRAM(s) Oral daily  atorvastatin 80 milliGRAM(s) Oral at bedtime  dextrose 5%. 1000 milliLiter(s) IV Continuous <Continuous>  dextrose 5%. 1000 milliLiter(s) IV Continuous <Continuous>  dextrose 50% Injectable 25 Gram(s) IV Push once  dextrose 50% Injectable 12.5 Gram(s) IV Push once  dextrose 50% Injectable 25 Gram(s) IV Push once  dextrose Oral Gel 15 Gram(s) Oral once PRN  furosemide   Injectable 20 milliGRAM(s) IV Push daily  glucagon  Injectable 1 milliGRAM(s) IntraMuscular once  heparin  Infusion.  Unit(s)/Hr IV Continuous <Continuous>  insulin glargine Injectable (LANTUS) 17 Unit(s) SubCutaneous at bedtime  insulin lispro (ADMELOG) corrective regimen sliding scale   SubCutaneous three times a day before meals  insulin lispro Injectable (ADMELOG) 6 Unit(s) SubCutaneous three times a day before meals  metoprolol succinate  milliGRAM(s) Oral daily      Vitals:  T(F): 97.9 (12-06), Max: 98.4 (12-05)  HR: 94 (12-06) (94 - 107)  BP: 112/76 (12-06) (112/76 - 120/75)  RR: 16 (12-06)  SpO2: 96% (12-06)  I&O's Summary    05 Dec 2022 07:01  -  06 Dec 2022 07:00  --------------------------------------------------------  IN: 360 mL / OUT: 450 mL / NET: -90 mL        Physical Exam:    Appearance: No acute distress; well appearing  Eyes: pink conjunctiva  HEENT: Normal oral mucosa  Cardiovascular: Irregular rhythm, normal rate, normal S1, S2, no murmurs, rubs, or gallops; 1+ edema, no JVD   Respiratory: Clear to auscultation bilaterally  Gastrointestinal: soft, non-tender, non-distended   Musculoskeletal: No clubbing; no joint deformity   Neurologic: Normal speech, no facial asymmetry  Psychiatry: AAOx3, mood & affect appropriate  Skin: No rashes, ecchymoses, or cyanosis of exposed skin                             11.5   3.82  )-----------( 211      ( 06 Dec 2022 06:13 )             36.4     12-06    142  |  106  |  38<H>  ----------------------------<  123<H>  4.5   |  24  |  2.13<H>    Ca    9.3      06 Dec 2022 06:12  Phos  3.6     12-06  Mg     2.2     12-06    TPro  6.4  /  Alb  3.6  /  TBili  0.2  /  DBili  x   /  AST  27  /  ALT  32  /  AlkPhos  40  12-06    PT/INR - ( 06 Dec 2022 06:14 )   PT: 13.1 sec;   INR: 1.13 ratio         PTT - ( 06 Dec 2022 06:14 )  PTT:97.9 sec  CARDIAC MARKERS ( 06 Dec 2022 06:12 )  x     / x     / x     / 252 U/L / x     / x      CARDIAC MARKERS ( 05 Dec 2022 04:34 )  x     / x     / x     / 398 U/L / x     / x      CARDIAC MARKERS ( 04 Dec 2022 05:50 )  x     / x     / x     / 781 U/L / x     / x      CARDIAC MARKERS ( 03 Dec 2022 07:07 )  x     / x     / x     / 1277 U/L / x     / x      CARDIAC MARKERS ( 02 Dec 2022 05:42 )  x     / x     / x     / 1921 U/L / x     / x          Serum Pro-Brain Natriuretic Peptide: 3355 pg/mL (11-30 @ 22:14)       Overnight Events: None     No chest pain, shortness of breath, or palpitations            Current Meds:  aspirin  chewable 81 milliGRAM(s) Oral daily  atorvastatin 80 milliGRAM(s) Oral at bedtime  dextrose 5%. 1000 milliLiter(s) IV Continuous <Continuous>  dextrose 5%. 1000 milliLiter(s) IV Continuous <Continuous>  dextrose 50% Injectable 25 Gram(s) IV Push once  dextrose 50% Injectable 12.5 Gram(s) IV Push once  dextrose 50% Injectable 25 Gram(s) IV Push once  dextrose Oral Gel 15 Gram(s) Oral once PRN  furosemide   Injectable 20 milliGRAM(s) IV Push daily  glucagon  Injectable 1 milliGRAM(s) IntraMuscular once  heparin  Infusion.  Unit(s)/Hr IV Continuous <Continuous>  insulin glargine Injectable (LANTUS) 17 Unit(s) SubCutaneous at bedtime  insulin lispro (ADMELOG) corrective regimen sliding scale   SubCutaneous three times a day before meals  insulin lispro Injectable (ADMELOG) 6 Unit(s) SubCutaneous three times a day before meals  metoprolol succinate  milliGRAM(s) Oral daily    ROS:  Negative    PMH:  Unchanged       Vitals:  T(F): 97.9 (12-06), Max: 98.4 (12-05)  HR: 94 (12-06) (94 - 107)  BP: 112/76 (12-06) (112/76 - 120/75)  RR: 16 (12-06)  SpO2: 96% (12-06)      Physical Exam:  Appearance: No acute distress; well appearing  Eyes: pink conjunctiva  HEENT: Normal oral mucosa  Cardiovascular: Irregular rhythm, normal rate, normal S1, S2, no murmurs, rubs, or gallops; 1+ edema, no JVD   Respiratory: Clear to auscultation bilaterally  Gastrointestinal: soft, non-tender, non-distended   Musculoskeletal: No clubbing; no joint deformity   Neurologic: Normal speech, no facial asymmetry  Psychiatry: AAOx3, mood & affect appropriate  Skin: No rashes, ecchymoses, or cyanosis of exposed skin       I&O's Summary  05 Dec 2022 07:01  -  06 Dec 2022 07:00  --------------------------------------------------------  IN: 360 mL / OUT: 450 mL / NET: -90 mL        LABS:                      11.5   3.82  )-----------( 211      ( 06 Dec 2022 06:13 )             36.4     12-06  142  |  106  |  38<H>  ----------------------------<  123<H>  4.5   |  24  |  2.13<H>    Ca    9.3      06 Dec 2022 06:12  Phos  3.6     12-06  Mg     2.2     12-06    TPro  6.4  /  Alb  3.6  /  TBili  0.2  /  DBili  x   /  AST  27  /  ALT  32  /  AlkPhos  40  12-06    PT/INR - ( 06 Dec 2022 06:14 )   PT: 13.1 sec;   INR: 1.13 ratio    PTT - ( 06 Dec 2022 06:14 )  PTT:97.9 sec    CARDIAC MARKERS ( 06 Dec 2022 06:12 )  x     / x     / x     / 252 U/L / x     / x      CARDIAC MARKERS ( 05 Dec 2022 04:34 )  x     / x     / x     / 398 U/L / x     / x      CARDIAC MARKERS ( 04 Dec 2022 05:50 )  x     / x     / x     / 781 U/L / x     / x      CARDIAC MARKERS ( 03 Dec 2022 07:07 )  x     / x     / x     / 1277 U/L / x     / x      CARDIAC MARKERS ( 02 Dec 2022 05:42 )  x     / x     / x     / 1921 U/L / x     / x        Serum Pro-Brain Natriuretic Peptide: 3355 pg/mL (11-30 @ 22:14)      NM Amyloidosis SPECT/CT, Single Area Single Day (12.05.22 @ 14:04)     COMPARISON: No previous similar studies were available for comparison.    FINDINGS: The technical quality of the images was satisfactory.  There is heterogeneously increased radiopharmaceutical uptake in the left ventricular myocardium, most intense in the septum. Faint right ventricular myocardial uptake is questioned. Bilateral renal cysts are  again noted.    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    ROSALIE MORGAN MD; Attending Nuclear Medicine  This document has been electronically signed. Dec  5 2022  3:03PM

## 2022-12-06 NOTE — PROGRESS NOTE ADULT - SUBJECTIVE AND OBJECTIVE BOX
[facility] DIVISION OF KIDNEY DISEASES AND HYPERTENSION   FOLLOW UP NOTE    --------------------------------------------------------------------------------  Chief Complaint:     24 hour events/subjective: Pt. was seen and examined today. no acute overnight events. Pt without orthopnea, pnd, GUILLEN, gross hematuria, dysuria. Making urine.      PAST HISTORY  --------------------------------------------------------------------------------  No significant changes to PMH, PSH, FHx, SHx, unless otherwise noted    ALLERGIES & MEDICATIONS  --------------------------------------------------------------------------------  Allergies    No Known Allergies    Intolerances      Standing Inpatient Medications  aspirin  chewable 81 milliGRAM(s) Oral daily  atorvastatin 80 milliGRAM(s) Oral at bedtime  dextrose 5%. 1000 milliLiter(s) IV Continuous <Continuous>  dextrose 5%. 1000 milliLiter(s) IV Continuous <Continuous>  dextrose 50% Injectable 25 Gram(s) IV Push once  dextrose 50% Injectable 12.5 Gram(s) IV Push once  dextrose 50% Injectable 25 Gram(s) IV Push once  furosemide   Injectable 20 milliGRAM(s) IV Push daily  glucagon  Injectable 1 milliGRAM(s) IntraMuscular once  heparin  Infusion.  Unit(s)/Hr IV Continuous <Continuous>  insulin glargine Injectable (LANTUS) 17 Unit(s) SubCutaneous at bedtime  insulin lispro (ADMELOG) corrective regimen sliding scale   SubCutaneous three times a day before meals  insulin lispro Injectable (ADMELOG) 6 Unit(s) SubCutaneous three times a day before meals  metoprolol succinate  milliGRAM(s) Oral daily    PRN Inpatient Medications  dextrose Oral Gel 15 Gram(s) Oral once PRN      REVIEW OF SYSTEMS  --------------------------------------------------------------------------------  Gen: No fevers/chills, foot pain improving  Head/Eyes/Ears: No HA   Respiratory: No dyspnea, cough  CV: No chest pain  GI: No abdominal pain, diarrhea  : No dysuria, hematuria  MSK: Edema unchanged  Skin: No rashes  Heme: No easy bruising or bleeding    All other systems were reviewed and are negative, except as noted.    VITALS/PHYSICAL EXAM  --------------------------------------------------------------------------------  T(C): 36.6 (12-06-22 @ 04:14), Max: 36.9 (12-05-22 @ 20:08)  HR: 94 (12-06-22 @ 04:14) (94 - 107)  BP: 112/76 (12-06-22 @ 04:14) (112/76 - 120/75)  RR: 16 (12-06-22 @ 04:14) (16 - 18)  SpO2: 96% (12-06-22 @ 04:14) (96% - 98%)  Wt(kg): --        12-05-22 @ 07:01  -  12-06-22 @ 07:00  --------------------------------------------------------  IN: 360 mL / OUT: 450 mL / NET: -90 mL        Physical Exam:  	Gen: NAD, sitting up in bed eating breakfast  	HEENT: Anicteric  	Pulm: CTA B/L  	CV: Irregular rhythm, no murmurs   	Ext: B/L 2+ to knee b/l  	Neuro: AAOx3, EOMI, BASURTO    LABS/STUDIES  --------------------------------------------------------------------------------              11.5   3.82  >-----------<  211      [12-06-22 @ 06:13]              36.4     142  |  106  |  38  ----------------------------<  123      [12-06-22 @ 06:12]  4.5   |  24  |  2.13        Ca     9.3     [12-06-22 @ 06:12]      Mg     2.2     [12-06-22 @ 06:12]      Phos  3.6     [12-06-22 @ 06:12]    TPro  6.4  /  Alb  3.6  /  TBili  0.2  /  DBili  x   /  AST  27  /  ALT  32  /  AlkPhos  40  [12-06-22 @ 06:12]    PT/INR: PT 13.1 , INR 1.13       [12-06-22 @ 06:14]  PTT: 97.9       [12-06-22 @ 06:14]          [12-06-22 @ 06:12]    Creatinine Trend:  SCr 2.13 [12-06 @ 06:12]  SCr 2.06 [12-05 @ 04:34]  SCr 2.07 [12-04 @ 05:50]  SCr 2.09 [12-03 @ 07:07]  SCr 1.86 [12-02 @ 05:42]    Urinalysis - [12-05-22 @ 16:18]      Color Yellow / Appearance Clear / SG 1.021 / pH 6.0      Gluc Negative / Ketone Negative  / Bili Negative / Urobili Negative       Blood Large / Protein 100 / Leuk Est Negative / Nitrite Negative       / WBC 1 / Hyaline 1 / Gran  / Sq Epi  / Non Sq Epi 0 / Bacteria Negative    Urine Creatinine 178      [12-05-22 @ 16:33]  Urine Protein 69      [12-05-22 @ 16:33]  Urine Urea Nitrogen 1112      [12-05-22 @ 16:32]  Urine Osmolality 633      [12-05-22 @ 16:33]    HCV 0.06, Nonreact      [12-02-22 @ 05:42]    Free Light Chains: kappa 4.11, lambda 2.75, ratio = 1.49      [12-04 @ 05:51]

## 2022-12-06 NOTE — MEDICAL STUDENT PROGRESS NOTE(EDUCATION) - SUBJECTIVE AND OBJECTIVE BOX
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     Patient states that he had an uneventful night; he endorses having rested well but not sleeping great, is eating well, having regular bowel movements and urinating with no issues. He denies any new pain or sx of dizziness/lightheadedness, chest pain, palpitations, SOB, chest tightness, nausea, vomiting, abdominal pain.      SUBJECTIVE / OVERNIGHT EVENTS: no acute events overnight    MEDICATIONS  (STANDING):  aspirin  chewable 81 milliGRAM(s) Oral daily  atorvastatin 80 milliGRAM(s) Oral at bedtime  dextrose 5%. 1000 milliLiter(s) (100 mL/Hr) IV Continuous <Continuous>  dextrose 5%. 1000 milliLiter(s) (50 mL/Hr) IV Continuous <Continuous>  dextrose 50% Injectable 25 Gram(s) IV Push once  dextrose 50% Injectable 12.5 Gram(s) IV Push once  dextrose 50% Injectable 25 Gram(s) IV Push once  glucagon  Injectable 1 milliGRAM(s) IntraMuscular once  heparin  Infusion.  Unit(s)/Hr (20 mL/Hr) IV Continuous <Continuous>  insulin glargine Injectable (LANTUS) 17 Unit(s) SubCutaneous at bedtime  insulin lispro (ADMELOG) corrective regimen sliding scale   SubCutaneous three times a day before meals  insulin lispro Injectable (ADMELOG) 6 Unit(s) SubCutaneous three times a day before meals  metoprolol succinate  milliGRAM(s) Oral daily    MEDICATIONS  (PRN):  dextrose Oral Gel 15 Gram(s) Oral once PRN Blood Glucose LESS THAN 70 milliGRAM(s)/deciliter      CAPILLARY BLOOD GLUCOSE      POCT Blood Glucose.: 169 mg/dL (06 Dec 2022 11:29)  POCT Blood Glucose.: 137 mg/dL (06 Dec 2022 07:55)  POCT Blood Glucose.: 164 mg/dL (05 Dec 2022 21:45)  POCT Blood Glucose.: 176 mg/dL (05 Dec 2022 17:08)    I&O's Summary    05 Dec 2022 07:01  -  06 Dec 2022 07:00  --------------------------------------------------------  IN: 360 mL / OUT: 450 mL / NET: -90 mL    06 Dec 2022 07:01  -  06 Dec 2022 14:34  --------------------------------------------------------  IN: 720 mL / OUT: 0 mL / NET: 720 mL        PHYSICAL EXAM:  Vital Signs Last 24 Hrs  T(C): 36.5 (06 Dec 2022 10:26), Max: 36.9 (05 Dec 2022 20:08)  T(F): 97.7 (06 Dec 2022 10:), Max: 98.4 (05 Dec 2022 20:08)  HR: 85 (06 Dec 2022 10:) (85 - 107)  BP: 119/68 (06 Dec 2022 10:) (112/76 - 119/68)  BP(mean): --  RR: 18 (06 Dec 2022 10:26) (16 - 18)  SpO2: 98% (06 Dec 2022 10:26) (96% - 98%)    Parameters above as of 06 Dec 2022 10:26  Patient On (Oxygen Delivery Method): room air    PHYSICAL EXAM:  GENERAL: Well-appearing male of stated age in no acute distress, comfortably sitting up in bed  HEAD:  Atraumatic, Normocephalic  EYES: EOMI, PERRLA, conjunctiva and sclera clear  NECK: Supple, no lymphadenopathy, no JVD  CHEST/LUNG: CTAB; No wheezes, rales, or rhonchi  HEART: normal rate, irregular rhythm; normal S1,S2, No murmurs, rubs, or gallops  ABDOMEN: Soft/firm, non-tender, protuberant; normoactive bowel sounds in all 4 quadrants, no organomegaly  EXTREMITIES:  2+ peripheral pulses b/l, No clubbing, no cyanosis; 1+ pitting edema to the knees b/l, left slightly > right  NEUROLOGY: A&O x 3, no focal deficits; CN II-XII grossly intact  SKIN: No rashes or lesions    LABS:                        11.5   3.82  )-----------( 211      ( 06 Dec 2022 06:13 )             36.4     12-    142  |  106  |  38<H>  ----------------------------<  123<H>  4.5   |  24  |  2.13<H>    Ca    9.3      06 Dec 2022 06:12  Phos  3.6       Mg     2.2         TPro  6.4  /  Alb  3.6  /  TBili  0.2  /  DBili  x   /  AST  27  /  ALT  32  /  AlkPhos  40  12-    PT/INR - ( 06 Dec 2022 06:14 )   PT: 13.1 sec;   INR: 1.13 ratio         PTT - ( 06 Dec 2022 06:14 )  PTT:97.9 sec  CARDIAC MARKERS ( 06 Dec 2022 06:12 )  x     / x     / 252 U/L / x     / x      CARDIAC MARKERS ( 05 Dec 2022 04:34 )  x     / x     / 398 U/L / x     / x          Urinalysis Basic - ( 05 Dec 2022 16:18 )    Color: Yellow / Appearance: Clear / S.021 / pH: x  Gluc: x / Ketone: Negative  / Bili: Negative / Urobili: Negative   Blood: x / Protein: 100 / Nitrite: Negative   Leuk Esterase: Negative / RBC: 117 /hpf / WBC 1 /HPF   Sq Epi: x / Non Sq Epi: 0 /hpf / Bacteria: Negative      RADIOLOGY & ADDITIONAL TESTS:     Sonography of the kidneys and bladder.    FINDINGS:  Right kidney: 12.8 cm. No renal mass, hydronephrosis or calculi. Multiple   simple cysts measuring up to 3.7 cm.    Left kidney: 13.1 cm. Central pelvic calcification measures 1.8 cm. No   hydronephrosis. Multiple simple cysts are present measuring up to 3.3 cm..    Urinary bladder: Bladder wall thickening.    Prostate gland: Mildly enlarged with a volume of 37 cc.      RADIOPHARMACEUTICAL: 15.2 mCi Tc-99m-pyrophosphate    FINDINGS: The technical quality of the images was satisfactory.  There is   heterogeneously increased radiopharmaceutical uptake in the left   ventricular myocardium, most intense in the septum. Faint right   ventricular myocardial uptake is questioned. Bilateral renal cysts are   again noted.    Left Ventricular Myocardium to Bone (visual at 3 hrs): Grade: 2-3    (normal: 0)    Right Ventricular Myocardium to Bone (visual at 3 hrs): Grade: 1    (normal: 0)    IMPRESSION: Cardiac amyloid Imaging study is  strongly suggestive of   transthyretin cardiac amyloidosis.      Cardiology consult ()  Recommendations:   - Heparin drip for AC given Atrial Fibrillation   - Continue home metoprolol succinate 100mg   - Can hold diuresis today; pt approaching euvolemia and already net negative 1600ml thus far.  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  - Case discussed with Interventional, angiogram once renal function improves, per nephrology, pt given diagnosis of CKD stage III, in the setting of DM and nephrolithiasis, Cr of 2.06 noted yesterday at baseline, recommended holding Lasix on the day of cardiac cath. Cr today has unfortunately increased further to 2.13    Nephrology consult ()  #TATI on CKD  Cr Baseline confirmed w/ pcp Dr. May Antonio, 2.01 on  of this year  Cr was 2.4 in 2018 iso of nephrolithiasis. [employer] reviewed without more lab information.  Cr here: Min 1.69, max 2.13  Cr uptrending slightly but grossly unchanged recently 2.13 <- 2.06 <- 2.07 <- 2.09 up from prior 1.89<-1.69<-2.01  Urine osmol 633, TP 69, Cr 178, TP/CR 0.4, urea 1112  FEUrea 36.8 % suggests intrinsic renal disease iso known CKD  UA + Blood with 71 RBCs; repeat UA +117 RBCs  Renal US: Nonobstructing left renal calculus. 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 Current lab values c/w stage 3 and similar to baseline Cr CKD.  Risk of Contrast induced rise in creatinine elevated in pt with CKD, DM, age >60, HTN  -Avoid nephrotoxic agents, dose meds per eGFR  -Continue to Trend BMPs  -If getting angiogram and If not fluid overloaded and okay from cardiac standpoint, would recommend fluids (1cc/kg/hr) 6 hours before and after angiogram. If not okay for IVF, would recommend holding diuresis on day of procedure.  #MicroHematuria  per pt has had issue before and told f/u urology  UA with RBCx2 (71 -->117)  Renal US: Nonobstructing left renal calculus. 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     Patient states that he had an uneventful night; he endorses having rested well but not sleeping great, is eating well, having regular bowel movements and urinating with no issues. He denies any new pain or sx of dizziness/lightheadedness, chest pain, palpitations, SOB, chest tightness, nausea, vomiting, abdominal pain.      SUBJECTIVE / OVERNIGHT EVENTS: no acute events overnight    MEDICATIONS  (STANDING):  aspirin  chewable 81 milliGRAM(s) Oral daily  atorvastatin 80 milliGRAM(s) Oral at bedtime  dextrose 5%. 1000 milliLiter(s) (100 mL/Hr) IV Continuous <Continuous>  dextrose 5%. 1000 milliLiter(s) (50 mL/Hr) IV Continuous <Continuous>  dextrose 50% Injectable 25 Gram(s) IV Push once  dextrose 50% Injectable 12.5 Gram(s) IV Push once  dextrose 50% Injectable 25 Gram(s) IV Push once  glucagon  Injectable 1 milliGRAM(s) IntraMuscular once  heparin  Infusion.  Unit(s)/Hr (20 mL/Hr) IV Continuous <Continuous>  insulin glargine Injectable (LANTUS) 17 Unit(s) SubCutaneous at bedtime  insulin lispro (ADMELOG) corrective regimen sliding scale   SubCutaneous three times a day before meals  insulin lispro Injectable (ADMELOG) 6 Unit(s) SubCutaneous three times a day before meals  metoprolol succinate  milliGRAM(s) Oral daily    MEDICATIONS  (PRN):  dextrose Oral Gel 15 Gram(s) Oral once PRN Blood Glucose LESS THAN 70 milliGRAM(s)/deciliter      CAPILLARY BLOOD GLUCOSE      POCT Blood Glucose.: 169 mg/dL (06 Dec 2022 11:29)  POCT Blood Glucose.: 137 mg/dL (06 Dec 2022 07:55)  POCT Blood Glucose.: 164 mg/dL (05 Dec 2022 21:45)  POCT Blood Glucose.: 176 mg/dL (05 Dec 2022 17:08)    I&O's Summary    05 Dec 2022 07:01  -  06 Dec 2022 07:00  --------------------------------------------------------  IN: 360 mL / OUT: 450 mL / NET: -90 mL    06 Dec 2022 07:01  -  06 Dec 2022 14:34  --------------------------------------------------------  IN: 720 mL / OUT: 0 mL / NET: 720 mL        PHYSICAL EXAM:  Vital Signs Last 24 Hrs  T(C): 36.5 (06 Dec 2022 10:26), Max: 36.9 (05 Dec 2022 20:08)  T(F): 97.7 (06 Dec 2022 10:), Max: 98.4 (05 Dec 2022 20:08)  HR: 85 (06 Dec 2022 10:) (85 - 107)  BP: 119/68 (06 Dec 2022 10:) (112/76 - 119/68)  BP(mean): --  RR: 18 (06 Dec 2022 10:26) (16 - 18)  SpO2: 98% (06 Dec 2022 10:26) (96% - 98%)    Parameters above as of 06 Dec 2022 10:26  Patient On (Oxygen Delivery Method): room air    PHYSICAL EXAM:  GENERAL: Well-appearing male of stated age in no acute distress, comfortably sitting up in bed  HEAD:  Atraumatic, Normocephalic  EYES: EOMI, PERRLA, conjunctiva and sclera clear  NECK: Supple, no lymphadenopathy, no JVD  CHEST/LUNG: CTAB; No wheezes, rales, or rhonchi  HEART: normal rate, irregular rhythm; normal S1,S2, No murmurs, rubs, or gallops  ABDOMEN: Soft/firm, non-tender, protuberant; normoactive bowel sounds in all 4 quadrants, no organomegaly  EXTREMITIES:  2+ peripheral pulses b/l, No clubbing, no cyanosis; 1+ pitting edema to the knees b/l, left slightly > right  NEUROLOGY: A&O x 3, no focal deficits; CN II-XII grossly intact  SKIN: No rashes or lesions    LABS:                        11.5   3.82  )-----------( 211      ( 06 Dec 2022 06:13 )             36.4     12-    142  |  106  |  38<H>  ----------------------------<  123<H>  4.5   |  24  |  2.13<H>    Ca    9.3      06 Dec 2022 06:12  Phos  3.6       Mg     2.2         TPro  6.4  /  Alb  3.6  /  TBili  0.2  /  DBili  x   /  AST  27  /  ALT  32  /  AlkPhos  40  12-    PT/INR - ( 06 Dec 2022 06:14 )   PT: 13.1 sec;   INR: 1.13 ratio         PTT - ( 06 Dec 2022 06:14 )  PTT:97.9 sec  CARDIAC MARKERS ( 06 Dec 2022 06:12 )  x     / x     / 252 U/L / x     / x      CARDIAC MARKERS ( 05 Dec 2022 04:34 )  x     / x     / 398 U/L / x     / x          Urinalysis Basic - ( 05 Dec 2022 16:18 )    Color: Yellow / Appearance: Clear / S.021 / pH: x  Gluc: x / Ketone: Negative  / Bili: Negative / Urobili: Negative   Blood: x / Protein: 100 / Nitrite: Negative   Leuk Esterase: Negative / RBC: 117 /hpf / WBC 1 /HPF   Sq Epi: x / Non Sq Epi: 0 /hpf / Bacteria: Negative      RADIOLOGY & ADDITIONAL TESTS:     Sonography of the kidneys and bladder.    FINDINGS:  Right kidney: 12.8 cm. No renal mass, hydronephrosis or calculi. Multiple   simple cysts measuring up to 3.7 cm.    Left kidney: 13.1 cm. Central pelvic calcification measures 1.8 cm. No   hydronephrosis. Multiple simple cysts are present measuring up to 3.3 cm..    Urinary bladder: Bladder wall thickening.    Prostate gland: Mildly enlarged with a volume of 37 cc.      RADIOPHARMACEUTICAL: 15.2 mCi Tc-99m-pyrophosphate    FINDINGS: The technical quality of the images was satisfactory.  There is   heterogeneously increased radiopharmaceutical uptake in the left   ventricular myocardium, most intense in the septum. Faint right   ventricular myocardial uptake is questioned. Bilateral renal cysts are   again noted.    Left Ventricular Myocardium to Bone (visual at 3 hrs): Grade: 2-3    (normal: 0)    Right Ventricular Myocardium to Bone (visual at 3 hrs): Grade: 1    (normal: 0)    IMPRESSION: Cardiac amyloid Imaging study is  strongly suggestive of   transthyretin cardiac amyloidosis.      Cardiology consult ()  Recommendations:   - Heparin drip for AC given Atrial Fibrillation   - Continue home metoprolol succinate 100mg   - Can hold diuresis today; pt approaching euvolemia and already net negative 1600ml thus far.  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  - Case discussed with Interventional, angiogram once renal function improves, per nephrology, pt given diagnosis of CKD stage III, in the setting of DM and nephrolithiasis, Cr of 2.06 noted yesterday at baseline, recommended holding Lasix on the day of cardiac cath. Cr today has unfortunately increased further to 2.13    Nephrology consult ()  Recommendations:  - Risk of Contrast induced rise in creatinine elevated in pt with CKD, DM, age >60, HTN  -Avoid nephrotoxic agents, dose meds per eGFR  -Continue to Trend BMPs  -If getting angiogram and If not fluid overloaded and okay from cardiac standpoint, would recommend fluids (1cc/kg/hr) 6 hours before and after angiogram. If not okay for IVF, would recommend holding diuresis on day of procedure.  #MicroHematuria  per pt has had issue before and told f/u urology  UA with RBCx2 (71 -->117)  Renal US: Nonobstructing left renal calculus. Bladder wall thickening   -F/U urology soon after DC may need cystoscopy r/o bladder CA

## 2022-12-06 NOTE — PROGRESS NOTE ADULT - PROBLEM SELECTOR PLAN 7
-c/w amlodpine for BP control   -holding ACE-I i/s/o CKD  -consider switching out amlodipine for other GDMT (SGLT2i)

## 2022-12-06 NOTE — PROGRESS NOTE ADULT - PROBLEM SELECTOR PLAN 4
Per endocrinologist, patient on 80 BID levemir and 45 TID novolog with meals  Unclear compliance per patient history; concern for over medication resulting in hypoglycemic episodes  -started with weight base dosing for insulin (0.3 mg/kg/day) (17 units at bedtime, 6 units premeal)  -low dose ISS (only required 1 additional unit yesterday)  -adjust Insulin as needed based on sliding scale requirements  - Insulin education on discharge

## 2022-12-06 NOTE — PROGRESS NOTE ADULT - PROBLEM SELECTOR PLAN 3
Elevated CK and + Blood in urine c/f rhabdomyolysis i/s/o hypoglycemic seizures  -Cr 2.02 on presentation; unclear baseline as PCP said he has CKD3, and had Cr>2 on lab imaging in past  -CK significantly downtrending  [ ] f/u repeat UA+Microscopy  - Cards recommended nephro input for cath given elevated Cr  - TATI on CKD likely 2/2 recent rhabdomyolysis (resolving), CHF, or diuretics   [ ] f/u Nephro recs- give fluids 6 hrs before, 6 hrs after @ 1 cc/kg/hr if planned for angio and euvolemic Elevated CK and + Blood in urine c/f rhabdomyolysis i/s/o hypoglycemic seizures  -Cr 2.02 on presentation; unclear baseline as PCP said he has CKD3, and had Cr>2 on lab imaging in past  -CK significantly downtrending  - repeat UA+Microscopy with significant RBCs 117  - Nephro: TATI on CKD likely 2/2 recent rhabdomyolysis (resolving), CHF, or diuretics   - Nephro recs- give fluids 6 hrs before, 6 hrs after @ 1 cc/kg/hr if planned for angio and euvolemic, and hold diuretics on day of angiogram  - Kidney/Bladder U/S with nonobstructing L renal calculus and bladder wall thickening  [ ] f/u Nephro recs  [ ] f/u Urology consult

## 2022-12-06 NOTE — PROGRESS NOTE ADULT - ASSESSMENT
73 yo male with a PMH of DM, HTN, HLD & hypoglycemic seizures.  Presented to the ED with dyspnea and orthopnea and was admitted with concern for heart failure exacerbation.    IMP:  1. New onset CHFrEF  2. ?New onset AF   - CXR 11/30: Cardiac size is within normal limits. Trace right sided pleural effusion  - TTE 12/1: EF 39%, Calcified aortic valve. The non and the left cusp appear functionally fused by calcification. Peak transaortic valve gradient equals 9 mm Hg, estimated aortic valve area equals 2.3 sqcm. Moderate  global left ventricular systolic dysfunction.  - No prior cath   - Troponin 96 --> 91   - BNP 3355  - Initial EKG and telemetry: AF/Aflutter, HRs 90s-100      Recommendations:   - Heparin drip for AC given Atrial Fibrillation   - Continue home metoprolol succinate 100mg   - Continue diuresis Lasix IV 20mg once daily   - Strict I/Os and weights   - Pt will need addition of additional GDMT with improvement in kidney function   - Tc-99m-PYP to assess for TTR CA (based on LV morphology on TTE): Cardiac amyloid Imaging study is  strongly suggestive of transthyretin cardiac amyloidosis.  - Case discussed with Interventional, angiogram once renal function improves, per nephrology, pt given diagnosis of CKD stage III, in the setting of DM and nephrolithiasis, Cr of 2.06 noted yesterday at baseline, recommended holding Lasix on the day of cardiac cath. Cr today has unfortunately increased further to 2.13      Silvia Orr MD  Cardiology Fellow    73 yo male with a PMH of DM, HTN, HLD & hypoglycemic seizures.  Presented to the ED with dyspnea and orthopnea and was admitted with concern for heart failure exacerbation.    IMP:  1. New onset CHFrEF  2. ?New onset AF   - CXR 11/30: Cardiac size is within normal limits. Trace right sided pleural effusion  - TTE 12/1: EF 39%, Calcified aortic valve. The non and the left cusp appear functionally fused by calcification. Peak transaortic valve gradient equals 9 mm Hg, estimated aortic valve area equals 2.3 sqcm. Moderate  global left ventricular systolic dysfunction.  - No prior cath   - Troponin 96 --> 91   - BNP 3355  - Initial EKG and telemetry: AF/Aflutter, HRs 90s-100      Recommendations:   - Heparin drip for AC given Atrial Fibrillation   - Continue home metoprolol succinate 100mg   - Can hold diuresis today; pt approaching euvolemia and already net negative 1600ml thus far today  - Strict I/Os and weights   - Pt will need addition of additional GDMT with improvement in kidney function   - Tc-99m-PYP to assess for TTR CA (based on LV morphology on TTE): Cardiac amyloid Imaging study is  strongly suggestive of transthyretin cardiac amyloidosis.  - Case discussed with Interventional, angiogram once renal function improves, per nephrology, pt given diagnosis of CKD stage III, in the setting of DM and nephrolithiasis, Cr of 2.06 noted yesterday at baseline, recommended holding Lasix on the day of cardiac cath. Cr today has unfortunately increased further to 2.13      Silvia Orr MD  Cardiology Fellow    73 yo male with a PMH of DM, HTN, HLD & hypoglycemic seizures.  Presented to the ED with dyspnea and orthopnea and was admitted with heart failure exacerbation.    IMP:  1. New onset CHFrEF  2. ?New onset AF   - CXR 11/30: Cardiac size is within normal limits. Trace right sided pleural effusion  - TTE 12/1: EF 39%, Calcified aortic valve. The non and the left cusp appear functionally fused by calcification. Peak transaortic valve gradient equals 9 mm Hg, estimated aortic valve area equals 2.3 sqcm. Moderate  global left ventricular systolic dysfunction.  - No prior cath   - Troponin 96 --> 91   - BNP 3355  - Initial EKG and telemetry: AF/Aflutter, HRs 90s-100      Recommendations:   - Heparin drip for AC given Atrial Fibrillation   - Continue home metoprolol succinate 100mg   - Can hold diuresis today; pt approaching euvolemia and already net negative 1600ml thus far.  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  - Case discussed with Interventional, angiogram once renal function improves, per nephrology, pt given diagnosis of CKD stage III, in the setting of DM and nephrolithiasis, Cr of 2.06 noted yesterday at baseline, recommended holding Lasix on the day of cardiac cath. Cr today has unfortunately increased further to 2.13      Silvia Orr MD  Cardiology Fellow     Plan discussed with cardiology fellow.  Patient seen and examined.  Hx., exam and labs as above.  I agree with the assessment and recommendations, which I have reviewed and edited where appropriate.  Parvez Bustamante M.D.  Cardiology Attending, Consult Service    For Cardiology consults and questions, all Cardiology service information can be found 24/7 on [email or web address] - use password: [email or web address] to log in.p

## 2022-12-06 NOTE — PROGRESS NOTE ADULT - ATTENDING COMMENTS
Stage 3 CKD: Pt. with stage 3 CKD In the setting of DM and nephrolithiasis with baseline Scr reported to be 2.0 as per PCP, admitted for hypervolemia in the setting of CHF exacerbation (concern for TTR amyloidosis), awaiting cardiac angiography. Pt. made aware that he is at increased risk of developing contrast associated TATI after receiving IV contrast during cardiac cath.  Scr at baseline, and noted to have improving volume status. consider holding lasix on the day of cardiac catheterization.   Monitor BMP. Strict I/Os. Avoid nephrotoxins. Dose meds as per eGFR.     Microhematuria: reports chronic microhematuria., also noted to have bladder wall thickening.   Pt. also noted to have mild proteinuria.   Check C3, C4, DANDY, dsDNA, ANCA, HBSAg, HCV Ab, anti-GBM, SIFE.  Check urine cytology.  Will benefit from Urology evaluation.

## 2022-12-06 NOTE — PROGRESS NOTE ADULT - SUBJECTIVE AND OBJECTIVE BOX
INTERVAL HPI/OVERNIGHT EVENTS:    SUBJECTIVE: Patient seen and examined at bedside.         OBJECTIVE:    VITAL SIGNS:  ICU Vital Signs Last 24 Hrs  T(C): 36.6 (06 Dec 2022 04:14), Max: 36.9 (05 Dec 2022 20:08)  T(F): 97.9 (06 Dec 2022 04:14), Max: 98.4 (05 Dec 2022 20:08)  HR: 94 (06 Dec 2022 04:14) (94 - 107)  BP: 112/76 (06 Dec 2022 04:14) (112/76 - 120/75)  BP(mean): --  ABP: --  ABP(mean): --  RR: 16 (06 Dec 2022 04:14) (16 - 18)  SpO2: 96% (06 Dec 2022 04:14) (96% - 98%)    O2 Parameters below as of 06 Dec 2022 04:14  Patient On (Oxygen Delivery Method): room air            Plateau pressure:   P/F ratio:     12-05 @ 07:01  -  12-06 @ 07:00  --------------------------------------------------------  IN: 360 mL / OUT: 450 mL / NET: -90 mL      CAPILLARY BLOOD GLUCOSE      POCT Blood Glucose.: 164 mg/dL (05 Dec 2022 21:45)    ECG:    PHYSICAL EXAM:    General: NAD  HEENT: clear conjunctiva  Neck: supple  Respiratory: CTA b/l  Cardiovascular: +S1/S2; RRR  Abdomen: soft, NT/ND; +BS x4  Extremities: 2+ peripheral pulses b/l; no LE edema  Skin: normal color and turgor; no rash  Neurological:    MEDICATIONS:  MEDICATIONS  (STANDING):  aspirin  chewable 81 milliGRAM(s) Oral daily  atorvastatin 80 milliGRAM(s) Oral at bedtime  dextrose 5%. 1000 milliLiter(s) (100 mL/Hr) IV Continuous <Continuous>  dextrose 5%. 1000 milliLiter(s) (50 mL/Hr) IV Continuous <Continuous>  dextrose 50% Injectable 25 Gram(s) IV Push once  dextrose 50% Injectable 12.5 Gram(s) IV Push once  dextrose 50% Injectable 25 Gram(s) IV Push once  furosemide   Injectable 20 milliGRAM(s) IV Push daily  glucagon  Injectable 1 milliGRAM(s) IntraMuscular once  heparin  Infusion.  Unit(s)/Hr (20 mL/Hr) IV Continuous <Continuous>  insulin glargine Injectable (LANTUS) 17 Unit(s) SubCutaneous at bedtime  insulin lispro (ADMELOG) corrective regimen sliding scale   SubCutaneous three times a day before meals  insulin lispro Injectable (ADMELOG) 6 Unit(s) SubCutaneous three times a day before meals  metoprolol succinate  milliGRAM(s) Oral daily    MEDICATIONS  (PRN):  dextrose Oral Gel 15 Gram(s) Oral once PRN Blood Glucose LESS THAN 70 milliGRAM(s)/deciliter      LABS:                        11.5   3.82  )-----------( 211      ( 06 Dec 2022 06:13 )             36.4     12-    142  |  106  |  38<H>  ----------------------------<  123<H>  4.5   |  24  |  2.13<H>    Ca    9.3      06 Dec 2022 06:12  Phos  3.6     12-  Mg     2.2     12-    TPro  6.4  /  Alb  3.6  /  TBili  0.2  /  DBili  x   /  AST  27  /  ALT  32  /  AlkPhos  40  12-06    PT/INR - ( 06 Dec 2022 06:14 )   PT: 13.1 sec;   INR: 1.13 ratio         PTT - ( 06 Dec 2022 06:14 )  PTT:97.9 sec  Urinalysis Basic - ( 05 Dec 2022 16:18 )    Color: Yellow / Appearance: Clear / S.021 / pH: x  Gluc: x / Ketone: Negative  / Bili: Negative / Urobili: Negative   Blood: x / Protein: 100 / Nitrite: Negative   Leuk Esterase: Negative / RBC: 117 /hpf / WBC 1 /HPF   Sq Epi: x / Non Sq Epi: 0 /hpf / Bacteria: Negative        RADIOLOGY & ADDITIONAL TESTS: Reviewed.     INTERVAL HPI/OVERNIGHT EVENTS:    SUBJECTIVE: Patient seen and examined at bedside. No acute overnight events.         OBJECTIVE:    VITAL SIGNS:  ICU Vital Signs Last 24 Hrs  T(C): 36.6 (06 Dec 2022 04:14), Max: 36.9 (05 Dec 2022 20:08)  T(F): 97.9 (06 Dec 2022 04:14), Max: 98.4 (05 Dec 2022 20:08)  HR: 94 (06 Dec 2022 04:14) (94 - 107)  BP: 112/76 (06 Dec 2022 04:14) (112/76 - 120/75)  BP(mean): --  ABP: --  ABP(mean): --  RR: 16 (06 Dec 2022 04:14) (16 - 18)  SpO2: 96% (06 Dec 2022 04:14) (96% - 98%)    O2 Parameters below as of 06 Dec 2022 04:14  Patient On (Oxygen Delivery Method): room air            Plateau pressure:   P/F ratio:     12-05 @ 07:01  -  12-06 @ 07:00  --------------------------------------------------------  IN: 360 mL / OUT: 450 mL / NET: -90 mL      CAPILLARY BLOOD GLUCOSE      POCT Blood Glucose.: 164 mg/dL (05 Dec 2022 21:45)    PHYSICAL EXAM:    General: NAD  HEENT: clear conjunctiva  Neck: supple  Respiratory: CTA b/l  Cardiovascular: +S1/S2; RRR  Abdomen: soft, NT/ND; +BS x4  Extremities: 2+ peripheral pulses b/l; no LE edema  Skin: normal color and turgor; no rash  Neurological:    MEDICATIONS:  MEDICATIONS  (STANDING):  aspirin  chewable 81 milliGRAM(s) Oral daily  atorvastatin 80 milliGRAM(s) Oral at bedtime  dextrose 5%. 1000 milliLiter(s) (100 mL/Hr) IV Continuous <Continuous>  dextrose 5%. 1000 milliLiter(s) (50 mL/Hr) IV Continuous <Continuous>  dextrose 50% Injectable 25 Gram(s) IV Push once  dextrose 50% Injectable 12.5 Gram(s) IV Push once  dextrose 50% Injectable 25 Gram(s) IV Push once  furosemide   Injectable 20 milliGRAM(s) IV Push daily  glucagon  Injectable 1 milliGRAM(s) IntraMuscular once  heparin  Infusion.  Unit(s)/Hr (20 mL/Hr) IV Continuous <Continuous>  insulin glargine Injectable (LANTUS) 17 Unit(s) SubCutaneous at bedtime  insulin lispro (ADMELOG) corrective regimen sliding scale   SubCutaneous three times a day before meals  insulin lispro Injectable (ADMELOG) 6 Unit(s) SubCutaneous three times a day before meals  metoprolol succinate  milliGRAM(s) Oral daily    MEDICATIONS  (PRN):  dextrose Oral Gel 15 Gram(s) Oral once PRN Blood Glucose LESS THAN 70 milliGRAM(s)/deciliter      LABS:                        11.5   3.82  )-----------( 211      ( 06 Dec 2022 06:13 )             36.4     12-    142  |  106  |  38<H>  ----------------------------<  123<H>  4.5   |  24  |  2.13<H>    Ca    9.3      06 Dec 2022 06:12  Phos  3.6     12-  Mg     2.2     12-    TPro  6.4  /  Alb  3.6  /  TBili  0.2  /  DBili  x   /  AST  27  /  ALT  32  /  AlkPhos  40  12-06    PT/INR - ( 06 Dec 2022 06:14 )   PT: 13.1 sec;   INR: 1.13 ratio         PTT - ( 06 Dec 2022 06:14 )  PTT:97.9 sec  Urinalysis Basic - ( 05 Dec 2022 16:18 )    Color: Yellow / Appearance: Clear / S.021 / pH: x  Gluc: x / Ketone: Negative  / Bili: Negative / Urobili: Negative   Blood: x / Protein: 100 / Nitrite: Negative   Leuk Esterase: Negative / RBC: 117 /hpf / WBC 1 /HPF   Sq Epi: x / Non Sq Epi: 0 /hpf / Bacteria: Negative        RADIOLOGY & ADDITIONAL TESTS: Reviewed.     INTERVAL HPI/OVERNIGHT EVENTS:    SUBJECTIVE: Patient seen and examined at bedside. No acute overnight events.     OBJECTIVE:    VITAL SIGNS:  ICU Vital Signs Last 24 Hrs  T(C): 36.6 (06 Dec 2022 04:14), Max: 36.9 (05 Dec 2022 20:08)  T(F): 97.9 (06 Dec 2022 04:14), Max: 98.4 (05 Dec 2022 20:08)  HR: 94 (06 Dec 2022 04:14) (94 - 107)  BP: 112/76 (06 Dec 2022 04:14) (112/76 - 120/75)  BP(mean): --  ABP: --  ABP(mean): --  RR: 16 (06 Dec 2022 04:14) (16 - 18)  SpO2: 96% (06 Dec 2022 04:14) (96% - 98%)    O2 Parameters below as of 06 Dec 2022 04:14  Patient On (Oxygen Delivery Method): room air            Plateau pressure:   P/F ratio:     12-05 @ 07:01  -  12-06 @ 07:00  --------------------------------------------------------  IN: 360 mL / OUT: 450 mL / NET: -90 mL      CAPILLARY BLOOD GLUCOSE      POCT Blood Glucose.: 164 mg/dL (05 Dec 2022 21:45)    PHYSICAL EXAM:    General: NAD  HEENT: clear conjunctiva  Neck: supple  Respiratory: CTA b/l  Cardiovascular: +S1/S2; IRR  Abdomen: soft, NT/ND; +BS x4  Extremities: 2+ peripheral pulses b/l; 2+ LE edema  Skin: normal color and turgor; no rash  Neurological: AOx3    MEDICATIONS:  MEDICATIONS  (STANDING):  aspirin  chewable 81 milliGRAM(s) Oral daily  atorvastatin 80 milliGRAM(s) Oral at bedtime  dextrose 5%. 1000 milliLiter(s) (100 mL/Hr) IV Continuous <Continuous>  dextrose 5%. 1000 milliLiter(s) (50 mL/Hr) IV Continuous <Continuous>  dextrose 50% Injectable 25 Gram(s) IV Push once  dextrose 50% Injectable 12.5 Gram(s) IV Push once  dextrose 50% Injectable 25 Gram(s) IV Push once  furosemide   Injectable 20 milliGRAM(s) IV Push daily  glucagon  Injectable 1 milliGRAM(s) IntraMuscular once  heparin  Infusion.  Unit(s)/Hr (20 mL/Hr) IV Continuous <Continuous>  insulin glargine Injectable (LANTUS) 17 Unit(s) SubCutaneous at bedtime  insulin lispro (ADMELOG) corrective regimen sliding scale   SubCutaneous three times a day before meals  insulin lispro Injectable (ADMELOG) 6 Unit(s) SubCutaneous three times a day before meals  metoprolol succinate  milliGRAM(s) Oral daily    MEDICATIONS  (PRN):  dextrose Oral Gel 15 Gram(s) Oral once PRN Blood Glucose LESS THAN 70 milliGRAM(s)/deciliter      LABS:                        11.5   3.82  )-----------( 211      ( 06 Dec 2022 06:13 )             36.4     12-    142  |  106  |  38<H>  ----------------------------<  123<H>  4.5   |  24  |  2.13<H>    Ca    9.3      06 Dec 2022 06:12  Phos  3.6     12-  Mg     2.2     12-    TPro  6.4  /  Alb  3.6  /  TBili  0.2  /  DBili  x   /  AST  27  /  ALT  32  /  AlkPhos  40  12-06    PT/INR - ( 06 Dec 2022 06:14 )   PT: 13.1 sec;   INR: 1.13 ratio         PTT - ( 06 Dec 2022 06:14 )  PTT:97.9 sec  Urinalysis Basic - ( 05 Dec 2022 16:18 )    Color: Yellow / Appearance: Clear / S.021 / pH: x  Gluc: x / Ketone: Negative  / Bili: Negative / Urobili: Negative   Blood: x / Protein: 100 / Nitrite: Negative   Leuk Esterase: Negative / RBC: 117 /hpf / WBC 1 /HPF   Sq Epi: x / Non Sq Epi: 0 /hpf / Bacteria: Negative        RADIOLOGY & ADDITIONAL TESTS: Reviewed.

## 2022-12-06 NOTE — PROGRESS NOTE ADULT - ATTENDING COMMENTS
72M with T2DM on insulin, HTN, HLD here with possible seizures 2/2 hypoglycemia likely from misunderstanding of insulin regimen, complicated by rhabdomyolysis and with worsening exercise tolerance secondary to possible acute heart failure.     # ADHF  Worsening pitting edema w/ worsening exercise tolerance and paroxysmal nocturnal dyspnea suggestive of heart failure, with elevated BMP  - echo with LVSD, EF 39%. already on ACE (held for tati), can transition to entresto when TATI resolves and after cath; creatinine stable. likely may be his baseline level. will continue to monitor for now.   - strict I&Os  - daily standing weights  - cardiology consult - plan for cath; held currently due to kidney function; may likely be baseline but will have nephro input   - on IV lasix 20mg daily for diuresis; cardio note appreciated, will hold off on diuresis for today    - dyspnea symptomatically improved; denies any further episodes of SOB   - amyloid scan done, strongly suggestive of amyloidosis       # Aflutter  - heparin gtt (CHADsVASC at least 4)  - can be discharged on doac if insurance covers  - cards consult     # Rhabdo  Mildly elevated CK i/s/o possible seizures 2/2 hypoglycemia  - Cr improved with 500cc in ED + gentle hydration 12/1  - trend CK til normalization; currenlty downtrending     # TATI on CKD  Elevated Cr i/s/o rhabdo, need to find out baseline (baseline Cr 2 per pcp)  - likely at baseline currently   - Avoid nephrotoxic agents  - Trend Cr daily  - nephro following     # T2DM  On basal/bolus 80BID/45qac per endocrinologist office. However, pt is confused about his exact regimen.  - weight base dose for now given he may not be taking his insulin correctly and he has been hypoglycemic at home  - monitor FS qac qhs   - basal/bolus 17/6 with adequate control  - counseled on carbohydrate control  - consult dietician for education on consistent carb diets at home  - a1c 9.8, lipid panel normal   - better controlled on current insulin regimen     # Cardiac Amyloidosis  - s/p amyloid scan; strongly suggestive of amyloidosis   - cardio f/u     #Microscopic hematuria   - patient with microscopic hematuria noted on UA  - renal US showing some bladder wall thickening  - patient aware of findings; f/u urology outpatient     Rest of plan as above. Discussed with HS. 72M with T2DM on insulin, HTN, HLD here with possible seizures 2/2 hypoglycemia likely from misunderstanding of insulin regimen, complicated by rhabdomyolysis and with worsening exercise tolerance secondary to possible acute heart failure.     # ADHF  Worsening pitting edema w/ worsening exercise tolerance and paroxysmal nocturnal dyspnea suggestive of heart failure, with elevated BMP  - echo with LVSD, EF 39%. already on ACE (held for tati), can transition to entresto when TATI resolves and after cath; creatinine stable. likely may be his baseline level. will continue to monitor for now.   - strict I&Os  - daily standing weights  - cardiology consult - plan for cath; held currently due to kidney function; may likely be baseline but will have nephro input   - on IV lasix 20mg daily for diuresis; cardio note appreciated, will hold off on diuresis for today    - dyspnea symptomatically improved; denies any further episodes of SOB   - amyloid scan done, strongly suggestive of amyloidosis       # Aflutter  - heparin gtt (CHADsVASC at least 4)  - can be discharged on doac if insurance covers  - cards consult.    # Rhabdo  Mildly elevated CK i/s/o possible seizures 2/2 hypoglycemia  - Cr improved with 500cc in ED + gentle hydration 12/1  - trend CK til normalization; currenlty downtrending     # TATI on CKD  Elevated Cr i/s/o rhabdo, need to find out baseline (baseline Cr 2 per pcp)  - likely at baseline currently   - Avoid nephrotoxic agents  - Trend Cr daily  - nephro following     # T2DM  On basal/bolus 80BID/45qac per endocrinologist office. However, pt is confused about his exact regimen.  - weight base dose for now given he may not be taking his insulin correctly and he has been hypoglycemic at home  - monitor FS qac qhs   - basal/bolus 17/6 with adequate control  - counseled on carbohydrate control  - consult dietician for education on consistent carb diets at home  - a1c 9.8, lipid panel normal   - better controlled on current insulin regimen     # Cardiac Amyloidosis  - s/p amyloid scan; strongly suggestive of amyloidosis   - cardio f/u     #Microscopic hematuria   - patient with microscopic hematuria noted on UA  - renal US showing some bladder wall thickening  - patient aware of findings; f/u urology outpatient     Rest of plan as above. Discussed with HS.

## 2022-12-06 NOTE — PROGRESS NOTE ADULT - ASSESSMENT
Case not yet d/w attending, plan to follow 72M PMHx IDDM (novolog and levemir), HTN, HLD p/w night time seizure like activity with episodes x2-3/last 2 weeks a/w hypoglycemia (Bs 30s-50s) as well as orthopnea and GUILLEN. CK initially 5173. Renal consulted for TATI with plans for angiogram      Impression: TATI on CKD iso diuresis         #CKD  Cr Baseline: unclear. was 2.4 in 2018 iso of nephrolithiasis. Allscripts reviewed without more lab information.  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 TTR amyloid has less kidney involvement than other genotypes but some TTR nephropathy has been described  Per pt has CKD stage 3 but unknown baseline cr. Current lab values c/w stage 3 CKD. Perhaps TATI on CKD iso recent but resolving rhabdo, CHF, and diuresis     Plan not yet discussed with attending Dr. Campos  -Avoid nephrotoxic agents, dose meds per eGFR  -Continue to Trend BMPs  - if angiogram is still necessary given positive nuclear medicine study, Would recommend fluids (1cc/kg/hr) 6 hours before and after angiogram if okay from cardiac standpoint. If not okay for IVF, would recommend holding diuresis on day of procedure.    #MicroHematuria  per pt has and issue before and told f/u urology  UA with RBCx2 (71 -->117)  -F/U urology soon after DC may need cystoscopy r/o bladder CA 72M PMHx IDDM (novolog and levemir), HTN, HLD p/w night time seizure like activity with episodes x2-3/last 2 weeks a/w hypoglycemia (Bs 30s-50s) as well as orthopnea and GUILLEN. CK initially 5173. Renal consulted for TATI with plans for angiogram    Impression: TATI on CKD iso diuresis, CHF, and recent Rhabdo    #CKD  Cr Baseline: unclear. was 2.4 in 2018 iso of nephrolithiasis. Allscripts reviewed without more lab information.  Cr here: Min 1.69, max 2.13  Cr uptrending slightly but grossly unchanged 2.13 <- 2.06 <- 2.07 <- 2.09  Urine osmol 633, TP 69, Cr 178, TP/CR 0.4,   UA + Blood with 71 RBCs; repeat UA +117 RBCs  Renal US: Nonobstructing left renal calculus. 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 TTR amyloid has less kidney involvement than other genotypes but some TTR nephropathy has been described  Per pt has CKD stage 3 but unknown baseline cr. Current lab values c/w stage 3 CKD. Likely TATI on CKD iso recent but resolving rhabdo, CHF, and diuresis   Plan not yet discussed with attending Dr. Campos  -Avoid nephrotoxic agents, dose meds per eGFR  -Continue to Trend BMPs  -If getting angiogram and If not fluid overloaded and okay from cardiac standpoint, would recommend fluids (1cc/kg/hr) 6 hours before and after angiogram. If not okay for IVF, would recommend holding diuresis on day of procedure.    #MicroHematuria  per pt has had issue before and told f/u urology  UA with RBCx2 (71 -->117)  Renal US: Nonobstructing left renal calculus. Bladder wall thickening   -F/U urology soon after DC may need cystoscopy r/o bladder CA 72M PMHx IDDM (novolog and levemir), HTN, HLD p/w night time seizure like activity with episodes x2-3/last 2 weeks a/w hypoglycemia (Bs 30s-50s) as well as orthopnea and GUILLEN. CK initially 5173. Renal consulted for TATI with plans for angiogram    Impression: TATI on CKD iso diuresis, CHF, and recent Rhabdo    #CKD  Cr Baseline: unclear. was 2.4 in 2018 iso of nephrolithiasis. Allscripts reviewed without more lab information.  Cr here: Min 1.69, max 2.13  Cr uptrending slightly but grossly unchanged 2.13 <- 2.06 <- 2.07 <- 2.09  Urine osmol 633, TP 69, Cr 178, TP/CR 0.4, urea 1112  FEUrea 36.8 % suggests intrinsic renal disease iso known CKD  UA + Blood with 71 RBCs; repeat UA +117 RBCs  Renal US: Nonobstructing left renal calculus. 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 TTR amyloid has less kidney involvement than other genotypes but some TTR nephropathy has been described  Per pt has CKD stage 3 but unknown baseline cr. Current lab values c/w stage 3 CKD. Likely TATI on CKD iso recent but resolving rhabdo, CHF, and diuresis   Plan not yet discussed with attending Dr. Campos  -Avoid nephrotoxic agents, dose meds per eGFR  -Continue to Trend BMPs  -If getting angiogram and If not fluid overloaded and okay from cardiac standpoint, would recommend fluids (1cc/kg/hr) 6 hours before and after angiogram. If not okay for IVF, would recommend holding diuresis on day of procedure.    #MicroHematuria  per pt has had issue before and told f/u urology  UA with RBCx2 (71 -->117)  Renal US: Nonobstructing left renal calculus. Bladder wall thickening   -F/U urology soon after DC may need cystoscopy r/o bladder CA 72M PMHx IDDM (novolog and levemir), HTN, HLD p/w night time seizure like activity with episodes x2-3/last 2 weeks a/w hypoglycemia (Bs 30s-50s) as well as orthopnea and GUILLEN. CK initially 5173. Renal consulted for TATI with plans for angiogram    Impression: TATI on CKD iso diuresis, CHF, and recent Rhabdo    Plan discussed with attending Dr. Campos    #TATI on CKD  attempted to clarify Cr Baseline w/ pcp Dr. May Antonio, awaiting return call  Cr was 2.4 in 2018 iso of nephrolithiasis. Allscripts reviewed without more lab information.  Cr here: Min 1.69, max 2.13  Cr uptrending slightly but grossly unchanged recently 2.13 <- 2.06 <- 2.07 <- 2.09 up from prior 1.89<-1.69<-2.01  Urine osmol 633, TP 69, Cr 178, TP/CR 0.4, urea 1112  FEUrea 36.8 % suggests intrinsic renal disease iso known CKD  UA + Blood with 71 RBCs; repeat UA +117 RBCs  Renal US: Nonobstructing left renal calculus. 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 TTR amyloid has less kidney involvement than other genotypes but some TTR nephropathy has been described  DM more likely cause of CKD a1c 9.7, had been 12 in 2009  Per pt has CKD stage 3 but unknown baseline cr. Current lab values c/w stage 3 CKD. Likely TATI on CKD iso recent but resolving rhabdo, CHF, and diuresis   -Avoid nephrotoxic agents, dose meds per eGFR  -Continue to Trend BMPs  -If getting angiogram and If not fluid overloaded and okay from cardiac standpoint, would recommend fluids (1cc/kg/hr) 6 hours before and after angiogram. If not okay for IVF, would recommend holding diuresis on day of procedure.    #MicroHematuria  per pt has had issue before and told f/u urology  UA with RBCx2 (71 -->117)  Renal US: Nonobstructing left renal calculus. Bladder wall thickening   -F/U urology soon after DC may need cystoscopy r/o bladder CA 72M PMHx IDDM (novolog and levemir), HTN, HLD p/w night time seizure like activity with episodes x2-3/last 2 weeks a/w hypoglycemia (Bs 30s-50s) as well as orthopnea and GUILLEN. CK initially 5173. Renal consulted for TATI with plans for angiogram    Impression: TATI on CKD iso diuresis, CHF, and recent Rhabdo    Plan discussed with attending Dr. Campos    #TATI on CKD  Cr Baseline confirmed w/ pcp Dr. May Antonio, 2.01 on august 4th of this year  Cr was 2.4 in 2018 iso of nephrolithiasis. Allscripts reviewed without more lab information.  Cr here: Min 1.69, max 2.13  Cr uptrending slightly but grossly unchanged recently 2.13 <- 2.06 <- 2.07 <- 2.09 up from prior 1.89<-1.69<-2.01  Urine osmol 633, TP 69, Cr 178, TP/CR 0.4, urea 1112  FEUrea 36.8 % suggests intrinsic renal disease iso known CKD  UA + Blood with 71 RBCs; repeat UA +117 RBCs  Renal US: Nonobstructing left renal calculus. 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 Current lab values c/w stage 3 and similar to baseline Cr CKD.  Risk of Contrast induced rise in creatinine elevated in pt with CKD, DM, age >60, HTN  -Avoid nephrotoxic agents, dose meds per eGFR  -Continue to Trend BMPs  -If getting angiogram and If not fluid overloaded and okay from cardiac standpoint, would recommend fluids (1cc/kg/hr) 6 hours before and after angiogram. If not okay for IVF, would recommend holding diuresis on day of procedure.    #MicroHematuria  per pt has had issue before and told f/u urology  UA with RBCx2 (71 -->117)  Renal US: Nonobstructing left renal calculus. Bladder wall thickening   -F/U urology soon after DC may need cystoscopy r/o bladder CA

## 2022-12-06 NOTE — PROGRESS NOTE ADULT - PROBLEM SELECTOR PLAN 2
New A fib, rate mostly contollred  CHADSVASC score of 4  -c/w Hep gtt as per Cards  -C/w BB for rate control

## 2022-12-06 NOTE — MEDICAL STUDENT PROGRESS NOTE(EDUCATION) - NS MD HP STUD ASPLAN PLAN FT
Problem 1: Acute decompensated heart failure  - Patient has history of orthopnea, PND, new onset exertional dyspnea, subjective weight gain in the past 1 month, with labs revealing elevated pro-BNP, all suggestive of ADHF  -Echocardiogram (TTE) revealed HFrEF (EF=39%), severe concentric LV remodeling, moderate global LV systolic dysfunction, calcified aortic valve, in addition to borderline pulmonary HTN  -saturating >96% on room air  - As per Cardiology recommendations:   - Heparin drip for AC given Atrial Fibrillation   - Continue home metoprolol succinate 100mg   - Hold diuresis today; pt approaching euvolemia and already net negative 1600ml thus far.  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  - Angiogram deferred for once renal function improves, recommended holding Lasix on the day of cardiac cath.     Problem 2: Atrial fibrillation  - newly diagnosed AFib, mostly rate controlled  - as per tele nurse, HR 90s-120s overnight with instance of 140, no new PVC runs since yesterday  -[id number] score 4 (4.8% stroke risk; start prophylactic anticoagulation)    Problem 3: TATI on CKD  - Initial insult likely secondary to recent rhabdomyolysis which is resolving or CHF exacerbation  -CK significantly downtrending  - Cr uptrending slightly but grossly unchanged recently 2.13 <- 2.06 <- 2.07 <- 2.09 up from prior 1.89<-1.69<-2.01  Urine osmol 633, TP 69, Cr 178, TP/CR 0.4, urea 1112  FEUrea 36.8 % suggests intrinsic renal disease iso known CKD  UA + Blood with 71 RBCs; repeat UA +117 RBCs  - Renal US: Nonobstructing left renal calculus. Bladder wall thickening which can be seen in the setting of cystitis but not correlating with UA  - DM more likely cause of CKD a1c 9.7, had been 12 in 2009  -Per pt has CKD stage 3    -Current lab values c/w stage 3   - As per PCP office, renal function is as follows: Oct 2020 BUN/Cr   -Risk of Contrast induced rise in creatinine elevated in pt with CKD, DM, age >60, HTN  -Avoid nephrotoxic agents, dose meds per eGFR  -Continue to Trend BMPs  -If getting angiogram and If not fluid overloaded and okay from cardiac standpoint, would recommend fluids (1cc/kg/hr) 6 hours before and after angiogram. If not okay for IVF, would recommend holding diuresis on day of procedure.    [ ] f/u Nephro recs- give fluids 6 hrs before, 6 hrs after @ 1 cc/kg/hr if planned for angio and euvolemic.   Problem 1: Acute decompensated heart failure  - Patient p/w history of orthopnea, PND, new onset exertional dyspnea, subjective weight gain in the past 1 month, with labs revealing elevated pro-BNP, all suggesting ADHF  -Echocardiogram (TTE) revealed HFrEF (EF=39%), severe concentric LV remodeling, moderate global LV systolic dysfunction, calcified aortic valve, in addition to borderline pulmonary HTN  -saturating >96% on room air  - As per Cardiology recommendations:   - Heparin drip for AC given Atrial Fibrillation   - Continue home metoprolol succinate 100mg   - Hold diuresis today; pt approaching euvolemia and already net negative 1600ml thus far.  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  - Angiogram deferred for once renal function improves, recommended holding Lasix on the day of cardiac cath.     Problem 2: Atrial fibrillation  - newly diagnosed AFib, mostly rate controlled  - as per tele nurse, HR 90s-120s overnight with instance of 140, no new PVC runs since yesterday  -[id number] score 4 (4.8% stroke risk; start prophylactic anticoagulation)  - c/w metoprolol succinate 100mg     Problem 3: TATI on CKD  - Initial insult likely secondary to recent rhabdomyolysis which is resolving or CHF exacerbation  - CK significantly downtrending  - Cr uptrending slightly but grossly unchanged recently 2.13 <- 2.06 <- 2.07 <- 2.09 up from prior 1.89<-1.69<-2.01; Urine osmol 633, TP 69, Cr 178, TP/CR 0.4, urea 1112; FEUrea 36.8 % suggests intrinsic renal disease in the setting of known CKD  - UA + Blood with 71 RBCs on admission; repeat UA + Blood with 117 RBCs  - Renal US: Nonobstructing left renal calculus. Bladder wall thickening which can be seen in the setting of cystitis but not correlating with UA  - CKD stage 3b as per patient;  ***As per PCP office, history of renal function is as follows: Oct 2020 BUN 22 Cr 1.71, Jan 2021 BUN 25 Cr 1.72, April 2021 BUN 27 Cr 1.73, Dec 2021 BUN 28 Cr 1.64, Mar 2022 BUN 34 Cr 1.84, Aug 2022 BUN 30 Cr 2.01  -Avoid nephrotoxic agents, dose meds per eGFR  -Continue to Trend BMPs  -If getting angiogram and If not fluid overloaded and okay from cardiac standpoint, would recommend fluids (1cc/kg/hr) 6 hours before and after angiogram. If not okay for IVF, would recommend holding diuresis on day of procedure.    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  - no further episodes of seizure-like activity while in the hospital  - His home regimen of insulin was Novolog (54 units) with meals and Levemir (80 units) qD as per his Endocrinologist's recommendation  - According to the patient's history, it is very likely that he has been improperly administering his medications while at home which would cause him to become hypoglycemic  - Weight based dose while in the hospital, starting on 0.3mg/kg/day, basal/bolus 17units at bedtime/6 units before meals  - Adjust insulin as needed based on sliding scale requirements  - Monitor fingerstick qhs  - HgA1c 11.0% on 8/12/2022 as per PCP  - Consider adding SGLT2i in the setting of likely heart failure for more optimal GDMT    Problem 5: Transaminitis  - Mildly elevated LFTs on admission without history of liver disease, improving, 27/32 today  - Initial insult likely secondary to rhabdomyolysis/muscle damage (cardiac or skeletal)  - continue to monitor    Problem 6: Elevated troponin  - Peak of 96 with increased CKMB; trend no longer needed  - likely secondary to type II NSTEMI    Problem 7: HTN  - Blood pressure has been well controlled since admission  - hold ACEi in the setting of TATI  - Consider switching amlodipine to other GDMT (e.g. SGLT2i)  - appreciate Cardiology recs    Problem 8: HLD  - patient reports no change in statin dosage over the past several years  - latest lipid panel revealed low HDL, otherwise w/n/l  - continue with atorvastatin 80mg    Problem 9: Preventative measures  - DVT prophylaxis with subcutaneous heparin  - diabetic diet

## 2022-12-07 LAB
ALBUMIN SERPL ELPH-MCNC: 3.7 G/DL — SIGNIFICANT CHANGE UP (ref 3.3–5)
ALP SERPL-CCNC: 39 U/L — LOW (ref 40–120)
ALT FLD-CCNC: 35 U/L — SIGNIFICANT CHANGE UP (ref 10–45)
ANION GAP SERPL CALC-SCNC: 10 MMOL/L — SIGNIFICANT CHANGE UP (ref 5–17)
APTT BLD: 95.2 SEC — HIGH (ref 27.5–35.5)
AST SERPL-CCNC: 25 U/L — SIGNIFICANT CHANGE UP (ref 10–40)
BASOPHILS # BLD AUTO: 0.03 K/UL — SIGNIFICANT CHANGE UP (ref 0–0.2)
BASOPHILS NFR BLD AUTO: 0.8 % — SIGNIFICANT CHANGE UP (ref 0–2)
BILIRUB SERPL-MCNC: 0.2 MG/DL — SIGNIFICANT CHANGE UP (ref 0.2–1.2)
BUN SERPL-MCNC: 33 MG/DL — HIGH (ref 7–23)
C3 SERPL-MCNC: 158 MG/DL — HIGH (ref 81–157)
C4 SERPL-MCNC: 58 MG/DL — HIGH (ref 13–39)
CALCIUM SERPL-MCNC: 9.4 MG/DL — SIGNIFICANT CHANGE UP (ref 8.4–10.5)
CHLORIDE SERPL-SCNC: 108 MMOL/L — SIGNIFICANT CHANGE UP (ref 96–108)
CO2 SERPL-SCNC: 23 MMOL/L — SIGNIFICANT CHANGE UP (ref 22–31)
CREAT SERPL-MCNC: 1.97 MG/DL — HIGH (ref 0.5–1.3)
EGFR: 36 ML/MIN/1.73M2 — LOW
EOSINOPHIL # BLD AUTO: 0.15 K/UL — SIGNIFICANT CHANGE UP (ref 0–0.5)
EOSINOPHIL NFR BLD AUTO: 3.8 % — SIGNIFICANT CHANGE UP (ref 0–6)
GLUCOSE BLDC GLUCOMTR-MCNC: 137 MG/DL — HIGH (ref 70–99)
GLUCOSE BLDC GLUCOMTR-MCNC: 139 MG/DL — HIGH (ref 70–99)
GLUCOSE BLDC GLUCOMTR-MCNC: 160 MG/DL — HIGH (ref 70–99)
GLUCOSE BLDC GLUCOMTR-MCNC: 166 MG/DL — HIGH (ref 70–99)
GLUCOSE SERPL-MCNC: 123 MG/DL — HIGH (ref 70–99)
HAV IGM SER-ACNC: SIGNIFICANT CHANGE UP
HBV CORE IGM SER-ACNC: SIGNIFICANT CHANGE UP
HBV SURFACE AG SER-ACNC: SIGNIFICANT CHANGE UP
HCT VFR BLD CALC: 37.3 % — LOW (ref 39–50)
HCV AB S/CO SERPL IA: 0.06 S/CO — SIGNIFICANT CHANGE UP (ref 0–0.99)
HCV AB SERPL-IMP: SIGNIFICANT CHANGE UP
HGB BLD-MCNC: 11.6 G/DL — LOW (ref 13–17)
IMM GRANULOCYTES NFR BLD AUTO: 0.3 % — SIGNIFICANT CHANGE UP (ref 0–0.9)
INR BLD: 1.15 RATIO — SIGNIFICANT CHANGE UP (ref 0.88–1.16)
LYMPHOCYTES # BLD AUTO: 1.4 K/UL — SIGNIFICANT CHANGE UP (ref 1–3.3)
LYMPHOCYTES # BLD AUTO: 35 % — SIGNIFICANT CHANGE UP (ref 13–44)
MAGNESIUM SERPL-MCNC: 2.1 MG/DL — SIGNIFICANT CHANGE UP (ref 1.6–2.6)
MCHC RBC-ENTMCNC: 29 PG — SIGNIFICANT CHANGE UP (ref 27–34)
MCHC RBC-ENTMCNC: 31.1 GM/DL — LOW (ref 32–36)
MCV RBC AUTO: 93.3 FL — SIGNIFICANT CHANGE UP (ref 80–100)
MONOCYTES # BLD AUTO: 0.39 K/UL — SIGNIFICANT CHANGE UP (ref 0–0.9)
MONOCYTES NFR BLD AUTO: 9.8 % — SIGNIFICANT CHANGE UP (ref 2–14)
NEUTROPHILS # BLD AUTO: 2.02 K/UL — SIGNIFICANT CHANGE UP (ref 1.8–7.4)
NEUTROPHILS NFR BLD AUTO: 50.3 % — SIGNIFICANT CHANGE UP (ref 43–77)
NRBC # BLD: 0 /100 WBCS — SIGNIFICANT CHANGE UP (ref 0–0)
PHOSPHATE SERPL-MCNC: 3.8 MG/DL — SIGNIFICANT CHANGE UP (ref 2.5–4.5)
PLATELET # BLD AUTO: 207 K/UL — SIGNIFICANT CHANGE UP (ref 150–400)
POTASSIUM SERPL-MCNC: 4.5 MMOL/L — SIGNIFICANT CHANGE UP (ref 3.5–5.3)
POTASSIUM SERPL-SCNC: 4.5 MMOL/L — SIGNIFICANT CHANGE UP (ref 3.5–5.3)
PROT SERPL-MCNC: 6.4 G/DL — SIGNIFICANT CHANGE UP (ref 6–8.3)
PROTHROM AB SERPL-ACNC: 13.2 SEC — SIGNIFICANT CHANGE UP (ref 10.5–13.4)
RBC # BLD: 4 M/UL — LOW (ref 4.2–5.8)
RBC # FLD: 13.8 % — SIGNIFICANT CHANGE UP (ref 10.3–14.5)
SODIUM SERPL-SCNC: 141 MMOL/L — SIGNIFICANT CHANGE UP (ref 135–145)
WBC # BLD: 4 K/UL — SIGNIFICANT CHANGE UP (ref 3.8–10.5)
WBC # FLD AUTO: 4 K/UL — SIGNIFICANT CHANGE UP (ref 3.8–10.5)

## 2022-12-07 PROCEDURE — 99233 SBSQ HOSP IP/OBS HIGH 50: CPT | Mod: GC

## 2022-12-07 PROCEDURE — 99232 SBSQ HOSP IP/OBS MODERATE 35: CPT | Mod: GC

## 2022-12-07 RX ADMIN — HEPARIN SODIUM 2000 UNIT(S)/HR: 5000 INJECTION INTRAVENOUS; SUBCUTANEOUS at 08:37

## 2022-12-07 RX ADMIN — Medication 81 MILLIGRAM(S): at 12:25

## 2022-12-07 RX ADMIN — Medication 100 MILLIGRAM(S): at 05:12

## 2022-12-07 RX ADMIN — ATORVASTATIN CALCIUM 80 MILLIGRAM(S): 80 TABLET, FILM COATED ORAL at 21:37

## 2022-12-07 RX ADMIN — Medication 6 UNIT(S): at 08:33

## 2022-12-07 RX ADMIN — Medication 6 UNIT(S): at 17:53

## 2022-12-07 RX ADMIN — INSULIN GLARGINE 17 UNIT(S): 100 INJECTION, SOLUTION SUBCUTANEOUS at 21:37

## 2022-12-07 RX ADMIN — HEPARIN SODIUM 2000 UNIT(S)/HR: 5000 INJECTION INTRAVENOUS; SUBCUTANEOUS at 22:38

## 2022-12-07 RX ADMIN — Medication 6 UNIT(S): at 12:00

## 2022-12-07 RX ADMIN — Medication 1: at 08:32

## 2022-12-07 NOTE — PROGRESS NOTE ADULT - PROBLEM SELECTOR PLAN 1
Orthopnea, elevated proBNP, pulmonary edema suggestive of ADHF  -TTE showed HFrEF (EF 39%)  -on room air  - Card recd IV Lasix 20 standing (held 12/6)  -C/w Toprol  -holding ACE-I i/s/o TATI --> consider switching to ARB/ARNI this admission once renal function improves   - add on SGLT2i for GDMT  -cardiology consulted for evaluation of new onset HF  -Angiogram Monday 12/5 deferred by Cards until further nephro input  - Tc-99m-PYP scan for amyloidosis- strongly suggestive  - Kappa/Thom ratio wnl   - Cardio still waiting for downtrending Cr prior to cath Orthopnea, elevated proBNP, pulmonary edema suggestive of ADHF  -TTE showed HFrEF (EF 39%)  -on room air  - Card recd IV Lasix 20 dosed daily (held today)  -C/w Toprol  -holding ACE-I i/s/o TATI --> consider switching to ARB/ARNI this admission once renal function improves   - add on SGLT2i for GDMT  -cardiology consulted for evaluation of new onset HF  -Angiogram Monday 12/5 deferred by Cards until further nephro input  - Tc-99m-PYP scan for amyloidosis- strongly suggestive  - Kappa/Thom ratio wnl   - plan for cardiac cath 12/8 with downtrending Cr   [ ] f/u genetic testing for amyloidosis

## 2022-12-07 NOTE — PROGRESS NOTE ADULT - ATTENDING COMMENTS
72M with T2DM on insulin, HTN, HLD here with possible seizures 2/2 hypoglycemia likely from misunderstanding of insulin regimen, complicated by rhabdomyolysis and with worsening exercise tolerance secondary to possible acute heart failure.     # ADHF  Worsening pitting edema w/ worsening exercise tolerance and paroxysmal nocturnal dyspnea suggestive of heart failure, with elevated BMP  - echo with LVSD, EF 39%. already on ACE (held for tati), can transition to entresto when TATI resolves and after cath; creatinine improved to 1.97 today. likely may be around his baseline level. will continue to monitor for now.   - strict I&Os  - daily standing weights  - cardiology consult - plan for cath; held currently due to kidney function; creatinine improved this morning; possible plan for cath tomorrow 11/8   - on IV lasix 20mg daily for diuresis; cardio note appreciated, will hold off on diuresis again today    - dyspnea symptomatically improved; denies any further episodes of SOB   - amyloid scan done, strongly suggestive of amyloidosis; genetic test sent as per cardiology recs     # Aflutter  - heparin gtt (CHADsVASC at least 4)  - can be discharged on doac if insurance covers  - cards consult.    # Rhabdo  Mildly elevated CK i/s/o possible seizures 2/2 hypoglycemia  - Cr improved with 500cc in ED + gentle hydration 12/1  - currently downtrended      # TATI on CKD  Elevated Cr i/s/o rhabdo, need to find out baseline (baseline Cr 2 per pcp)  - likely at baseline currently   - Avoid nephrotoxic agents  - Trend Cr daily  - nephro following     # T2DM  On basal/bolus 80BID/45qac per endocrinologist office. However, pt is confused about his exact regimen.  - weight base dose for now given he may not be taking his insulin correctly and he has been hypoglycemic at home  - monitor FS qac qhs   - basal/bolus 17/6 with adequate control  - counseled on carbohydrate control  - consult dietician for education on consistent carb diets at home  - a1c 9.8, lipid panel normal   - better controlled on current insulin regimen     # Cardiac Amyloidosis  - s/p amyloid scan; strongly suggestive of amyloidosis   - cardio f/u     #Microscopic hematuria   - patient with microscopic hematuria noted on UA  - renal US showing some bladder wall thickening  - patient aware of findings; f/u urology outpatient     Rest of plan as above. Discussed with HS. 72M with T2DM on insulin, HTN, HLD here with possible seizures 2/2 hypoglycemia likely from misunderstanding of insulin regimen, complicated by rhabdomyolysis and with worsening exercise tolerance secondary to possible acute heart failure.     # ADHF  Worsening pitting edema w/ worsening exercise tolerance and paroxysmal nocturnal dyspnea suggestive of heart failure, with elevated BMP  - echo with LVSD, EF 39%. already on ACE (held for tati), can transition to entresto when TATI resolves and after cath; creatinine improved to 1.97 today. likely may be around his baseline level. will continue to monitor for now.   - strict I&Os  - daily standing weights  - cardiology consult - plan for cath; held currently due to kidney function; creatinine improved this morning; possible plan for cath tomorrow 11/8   - on IV lasix 20mg daily for diuresis; cardio note appreciated, will hold off on diuresis again today    - dyspnea symptomatically improved; denies any further episodes of SOB   - amyloid scan done, strongly suggestive of amyloidosis; genetic test sent as per cardiology recs     # Aflutter  - heparin gtt (CHADsVASC at least 4)  - can be discharged on doac if insurance covers  - cards consult.    # Rhabdo  Mildly elevated CK i/s/o possible seizures 2/2 hypoglycemia  - Cr improved with 500cc in ED + gentle hydration 12/1  - currently downtrended      # TATI on CKD  Elevated Cr i/s/o rhabdo, need to find out baseline (baseline Cr 2 per pcp)  - likely at baseline currently   - Avoid nephrotoxic agents  - Trend Cr daily  - nephro following; send off workup including C3, C4, DANDY, dsDNA, ANCA, HBSAg, HCV Ab, anti-GBM    # T2DM  On basal/bolus 80BID/45qac per endocrinologist office. However, pt is confused about his exact regimen.  - weight base dose for now given he may not be taking his insulin correctly and he has been hypoglycemic at home  - monitor FS qac qhs   - basal/bolus 17/6 with adequate control  - counseled on carbohydrate control  - consult dietician for education on consistent carb diets at home  - a1c 9.8, lipid panel normal   - better controlled on current insulin regimen     # Cardiac Amyloidosis  - s/p amyloid scan; strongly suggestive of amyloidosis   - cardio f/u; genetic lab sent off to lab as per cardio recs     #Microscopic hematuria   - patient with microscopic hematuria noted on UA  - renal US showing some bladder wall thickening  - patient aware of findings; urology eval      Rest of plan as above. Discussed with HS. 72M with T2DM on insulin, HTN, HLD here with possible seizures 2/2 hypoglycemia likely from misunderstanding of insulin regimen, complicated by rhabdomyolysis and with worsening exercise tolerance secondary to possible acute heart failure.     # ADHF  Worsening pitting edema w/ worsening exercise tolerance and paroxysmal nocturnal dyspnea suggestive of heart failure, with elevated BMP  - echo with LVSD, EF 39%. already on ACE (held for tati), can transition to entresto when TATI resolves and after cath; creatinine improved to 1.97 today. likely may be around his baseline level. will continue to monitor for now.   - strict I&Os  - daily standing weights  - cardiology consult - plan for cath; held currently due to kidney function; creatinine improved this morning; possible plan for cath tomorrow 11/8   - on IV lasix 20mg daily for diuresis; cardio note appreciated, will hold off on diuresis again today    - dyspnea symptomatically improved; denies any further episodes of SOB   - amyloid scan done, strongly suggestive of amyloidosis; genetic test sent as per cardiology recs.    # Aflutter  - heparin gtt (CHADsVASC at least 4)  - can be discharged on doac if insurance covers  - cards consult.    # Rhabdo  Mildly elevated CK i/s/o possible seizures 2/2 hypoglycemia  - Cr improved with 500cc in ED + gentle hydration 12/1  - currently downtrended      # TATI on CKD  Elevated Cr i/s/o rhabdo, need to find out baseline (baseline Cr 2 per pcp)  - likely at baseline currently   - Avoid nephrotoxic agents  - Trend Cr daily  - nephro following; send off workup including C3, C4, DANDY, dsDNA, ANCA, HBSAg, HCV Ab, anti-GBM    # T2DM  On basal/bolus 80BID/45qac per endocrinologist office. However, pt is confused about his exact regimen.  - weight base dose for now given he may not be taking his insulin correctly and he has been hypoglycemic at home  - monitor FS qac qhs   - basal/bolus 17/6 with adequate control  - counseled on carbohydrate control  - consult dietician for education on consistent carb diets at home  - a1c 9.8, lipid panel normal   - better controlled on current insulin regimen     # Cardiac Amyloidosis  - s/p amyloid scan; strongly suggestive of amyloidosis   - cardio f/u; genetic lab sent off to lab as per cardio recs     #Microscopic hematuria   - patient with microscopic hematuria noted on UA  - renal US showing some bladder wall thickening  - patient aware of findings; urology eval      Rest of plan as above. Discussed with HS.

## 2022-12-07 NOTE — PROGRESS NOTE ADULT - ASSESSMENT
72M PMHx IDDM (novolog and levemir), HTN, HLD p/w night time seizure like activity with episodes x2-3/last 2 weeks a/w hypoglycemia (Bs 30s-50s) as well as orthopnea and GUILLEN. CK initially 5173. Renal consulted for TATI with plans for angiogram    Impression: resolving TATI on CKD iso diuresis, CHF, and recent Rhabdo    Plan not yet discussed with attending Dr. Campos    #TATI on CKD  Cr Baseline confirmed w/ pcp Dr. May Antonio, 2.01 on august 4th of this year  Cr was 2.4 in 2018 iso of nephrolithiasis. Allscripts reviewed without more lab information.  Cr here: Min 1.69, max 2.13  Cr now downtrending 1.97 <- 2.13 <- 2.06 <- 2.07 <- 2.09 up from prior 1.89<-1.69<-2.01  Urine osmol 633, TP 69, Cr 178, TP/CR 0.4, urea 1112  FEUrea 36.8 % suggests intrinsic renal disease iso known CKD  UA + Blood with 71 RBCs; repeat UA +117 RBCs  Renal US: Nonobstructing left renal calculus. 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 Current lab values c/w stage 3 and similar to baseline Cr CKD.  Risk of Contrast induced rise in creatinine elevated in pt with CKD, DM, age >60, HTN  -Avoid nephrotoxic agents, dose meds per eGFR  -Continue to Trend BMPs  -If getting angiogram and If not fluid overloaded and okay from cardiac standpoint, would recommend fluids (1cc/kg/hr) 6 hours before and after angiogram. If not okay for IVF, would recommend to continue holding diuresis on day of procedure     #MicroHematuria  with proteinuria   per pt has had issue before and told f/u urology  UA with RBCx2 (71 -->117)  Renal US: Nonobstructing left renal calculus. Bladder wall thickening   -Urology eval  -Check C3, C4, DANDY, dsDNA, ANCA, HBSAg, HCV Ab, anti-GBM, SIFE, and urine cytology  72M PMHx IDDM (novolog and levemir), HTN, HLD p/w night time seizure like activity with episodes x2-3/last 2 weeks a/w hypoglycemia (Bs 30s-50s) as well as orthopnea and GUILLEN. CK initially 5173. Renal consulted for TATI with plans for angiogram    Impression: resolving TATI on CKD iso diuresis, CHF, and recent Rhabdo    Plan discussed with attending Dr. Campos    #TATI on CKD  Cr Baseline confirmed w/ pcp Dr. May Antonio, 2.01 on august 4th of this year  Cr was 2.4 in 2018 iso of nephrolithiasis. Allscripts reviewed without more lab information.  Cr here: Min 1.69, max 2.13  Cr now downtrending 1.97 <- 2.13 <- 2.06 <- 2.07 <- 2.09 up from prior 1.89<-1.69<-2.01 though numbers below baseline may be iso fluid overload  Urine osmol 633, TP 69, Cr 178, TP/CR 0.4, urea 1112  FEUrea 36.8 % suggests intrinsic renal disease iso known CKD  UA + Blood with 71 RBCs; repeat UA +117 RBCs  Renal US: Nonobstructing left renal calculus. 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 Current lab values c/w stage 3 and similar to baseline Cr CKD.  Risk of Contrast induced rise in creatinine elevated in pt with CKD, DM, age >60, HTN  -Avoid nephrotoxic agents, dose meds per eGFR  -Continue to Trend BMPs  -If getting angiogram and If euvolemic on day of study and okay from cardiac standpoint, would recommend fluids (1cc/kg/hr) 6 hours before and after angiogram. If fluid overloaded, would recommend to continue holding diuresis on day of procedure     #MicroHematuria  with proteinuria   per pt has had issue before and told f/u urology  UA with RBCx2 (71 -->117)  Renal US: Nonobstructing left renal calculus. Bladder wall thickening   -Urology eval  -Check C3, C4, DANDY, dsDNA, ANCA, HBSAg, HCV Ab, anti-GBM, SIFE, and urine cytology

## 2022-12-07 NOTE — PROGRESS NOTE ADULT - ASSESSMENT
73 yo male with a PMH of DM, HTN, HLD & hypoglycemic seizures. Presented to the ED with dyspnea and orthopnea and was admitted with heart failure exacerbation.    IMP:  1. New onset CHFrEF  2. ?New onset AF   - CXR 11/30: Cardiac size is within normal limits. Trace right sided pleural effusion  - TTE 12/1: EF 39%, Calcified aortic valve. The non and the left cusp appear functionally fused by calcification. Peak transaortic valve gradient equals 9 mm Hg, estimated aortic valve area equals 2.3 sqcm. Moderate  global left ventricular systolic dysfunction.  - No prior cath   - Troponin 96 --> 91   - BNP 3355  - Initial EKG and telemetry: AF/Aflutter, HRs 90s-100      Recommendations:   - Heparin drip for AC given Atrial Fibrillation   - Continue home metoprolol succinate 100mg   - Continue to hold diuresis net negative 2400ml thus far, Cr improving   - Strict I/Os and weights   - Will consider additional GDMT with improvement in kidney function   - Tc-99m-PYP to assess for TTR CA (based on LV morphology on TTE): Cardiac amyloid Imaging study is  strongly suggestive of transthyretin cardiac amyloidosis.  - Case discussed with Interventional, angiogram once renal function improves, per nephrology, pt given diagnosis of CKD stage III, in the setting of DM and nephrolithiasis. Cr improving  - Lavender top blood sample requested for further amyloid genetics blood testing     Silvia Orr MD  Cardiology Fellow     Recommendations are preliminary until cosigned by attending    73 yo male with a PMH of DM, HTN, HLD & hypoglycemic seizures. Presented to the ED with dyspnea and orthopnea and was admitted with heart failure exacerbation.    IMP:  1. New onset CHFrEF  2. ?New onset AF   - CXR 11/30: Cardiac size is within normal limits. Trace right sided pleural effusion  - TTE 12/1: EF 39%, Calcified aortic valve. The non and the left cusp appear functionally fused by calcification. Peak transaortic valve gradient equals 9 mm Hg, estimated aortic valve area equals 2.3 sqcm. Moderate  global left ventricular systolic dysfunction.  - No prior cath   - Troponin 96 --> 91   - BNP 3355  - Initial EKG and telemetry: AF/Aflutter, HRs 90s-100      Recommendations:   - Heparin drip for AC given Atrial Fibrillation   - Continue home metoprolol succinate 100mg   - Continue to hold diuresis net negative 2400ml thus far, Cr improving   - Strict I/Os and weights   - Will consider additional GDMT with improvement in kidney function   - Tc-99m-PYP to assess for TTR CA (based on LV morphology on TTE): Cardiac amyloid Imaging study is  strongly suggestive of transthyretin cardiac amyloidosis.  - Case discussed with Interventional, angiogram once renal function improves, per nephrology, pt given diagnosis of CKD stage III, in the setting of DM and nephrolithiasis. Cr improving  -Blood sample for further amyloid genetics blood testing obtained     Silvia Orr MD  Cardiology Fellow     Recommendations are preliminary until cosigned by attending    73 yo male with a PMH of DM, HTN, HLD & hypoglycemic seizures. Presented to the ED with dyspnea and orthopnea and was admitted with heart failure exacerbation.    IMP:  1. New onset CHFrEF  2. ?New onset AF   - CXR 11/30: Cardiac size is within normal limits. Trace right sided pleural effusion  - TTE 12/1: EF 39%, Calcified aortic valve. The non and the left cusp appear functionally fused by calcification. Peak transaortic valve gradient equals 9 mm Hg, estimated aortic valve area equals 2.3 sqcm. Moderate  global left ventricular systolic dysfunction.  - No prior cath   - Troponin 96 --> 91   - BNP 3355  - Initial EKG and telemetry: AF/Aflutter, HRs 90s-100      Recommendations:   - Heparin drip for AC given Atrial Fibrillation   - Continue home metoprolol succinate 100mg   - Continue to hold diuresis net negative 2400ml thus far, Cr improving   - Strict I/Os and weights   - Will consider additional GDMT with improvement in kidney function   - Tc-99m-PYP to assess for TTR CA (based on LV morphology on TTE): Cardiac amyloid Imaging study is  strongly suggestive of transthyretin cardiac amyloidosis.  - Case discussed with Interventional, angiogram once renal function improves, per nephrology, pt given diagnosis of CKD stage III, in the setting of DM and nephrolithiasis. Cr improving and baseline ~2  - Plan for angiogram on 12/8 if Cr remains stable   -Blood sample for further amyloid genetics blood testing obtained     Silvia Orr MD  Cardiology Fellow     Recommendations are preliminary until cosigned by attending    73 yo male with a PMH of DM, HTN, HLD & hypoglycemic seizures.   Presented to the ED with dyspnea and orthopnea and was admitted with heart failure exacerbation.      IMP:  1. New onset CHFrEF  2. ?New onset AF   - CXR 11/30: Cardiac size is within normal limits. Trace right sided pleural effusion  - TTE 12/1: EF 39%, Calcified aortic valve. The non and the left cusp appear functionally fused by calcification. Peak transaortic valve gradient equals 9 mm Hg, estimated aortic valve area equals 2.3 sqcm. Moderate  global left ventricular systolic dysfunction.  - No prior cath   - Troponin 96 --> 91   - BNP 3355  - Initial EKG and telemetry: AF/Aflutter, HRs 90s-100    Recommendations:   - Heparin drip for A/C given Atrial Fibrillation   - Continue home metoprolol succinate 100mg   - Continue to hold diuresis net negative 2400ml thus far, Cr improving   - Strict I/Os and weights   - Will consider additional GDMT with improvement in kidney function   - Tc-99m-PYP to assess for TTR CA (based on LV morphology on TTE): Cardiac amyloid Imaging study is  strongly suggestive of transthyretin cardiac amyloidosis.  - Case discussed with interventional -  angiogram once renal function improves, per nephrology, pt given diagnosis of CKD stage III, in the setting of DM and nephrolithiasis. Cr improving and baseline ~2.    - Will plan for angiogram on 12/8 if Cr remains stable   - Blood sample for further amyloid genetics blood testing obtained       Silvia Orr MD  Cardiology Fellow     Plan discussed with cardiology fellow.  Patient seen and examined.  Hx., exam and labs as above.  I agree with the assessment and recommendations, which I have reviewed and edited where appropriate.  Parvez Bustamante M.D.  Cardiology Attending, Consult Service    For Cardiology consults and questions, all Cardiology service information can be found 24/7 on [email or web address] - use password: [email or web address] to log in.

## 2022-12-07 NOTE — PROGRESS NOTE ADULT - SUBJECTIVE AND OBJECTIVE BOX
Overnight Events: None    Review Of Systems: No chest pain, shortness of breath, or palpitations            Current Meds:  aspirin  chewable 81 milliGRAM(s) Oral daily  atorvastatin 80 milliGRAM(s) Oral at bedtime  dextrose 5%. 1000 milliLiter(s) IV Continuous <Continuous>  dextrose 5%. 1000 milliLiter(s) IV Continuous <Continuous>  dextrose 50% Injectable 25 Gram(s) IV Push once  dextrose 50% Injectable 12.5 Gram(s) IV Push once  dextrose 50% Injectable 25 Gram(s) IV Push once  dextrose Oral Gel 15 Gram(s) Oral once PRN  glucagon  Injectable 1 milliGRAM(s) IntraMuscular once  heparin  Infusion.  Unit(s)/Hr IV Continuous <Continuous>  insulin glargine Injectable (LANTUS) 17 Unit(s) SubCutaneous at bedtime  insulin lispro (ADMELOG) corrective regimen sliding scale   SubCutaneous three times a day before meals  insulin lispro Injectable (ADMELOG) 6 Unit(s) SubCutaneous three times a day before meals  metoprolol succinate  milliGRAM(s) Oral daily      Vitals:  T(F): 97.7 (12-07), Max: 97.9 (12-06)  HR: 71 (12-07) (71 - 102)  BP: 148/87 (12-07) (119/68 - 148/87)  RR: 18 (12-07)  SpO2: 97% (12-07)  I&O's Summary    06 Dec 2022 07:01  -  07 Dec 2022 07:00  --------------------------------------------------------  IN: 720 mL / OUT: 1500 mL / NET: -780 mL        Physical Exam:      Appearance: No acute distress; well appearing  Eyes: pink conjunctiva  HEENT: Normal oral mucosa  Cardiovascular: Irregular rhythm, normal rate, normal S1, S2, no murmurs, rubs, or gallops; 1-2+ edema, no JVD   Respiratory: Clear to auscultation bilaterally  Gastrointestinal: soft, non-tender, non-distended   Musculoskeletal: No clubbing; no joint deformity   Neurologic: Normal speech, no facial asymmetry  Psychiatry: AAOx3, mood & affect appropriate  Skin: No rashes, ecchymoses, or cyanosis of exposed skin                           11.6   4.00  )-----------( 207      ( 07 Dec 2022 05:43 )             37.3     12-07    141  |  108  |  33<H>  ----------------------------<  123<H>  4.5   |  23  |  1.97<H>    Ca    9.4      07 Dec 2022 05:45  Phos  3.8     12-07  Mg     2.1     12-07    TPro  6.4  /  Alb  3.7  /  TBili  0.2  /  DBili  x   /  AST  25  /  ALT  35  /  AlkPhos  39<L>  12-07    PT/INR - ( 07 Dec 2022 05:44 )   PT: 13.2 sec;   INR: 1.15 ratio         PTT - ( 07 Dec 2022 05:44 )  PTT:95.2 sec  CARDIAC MARKERS ( 06 Dec 2022 06:12 )  x     / x     / x     / 252 U/L / x     / x      CARDIAC MARKERS ( 05 Dec 2022 04:34 )  x     / x     / x     / 398 U/L / x     / x      CARDIAC MARKERS ( 04 Dec 2022 05:50 )  x     / x     / x     / 781 U/L / x     / x      CARDIAC MARKERS ( 03 Dec 2022 07:07 )  x     / x     / x     / 1277 U/L / x     / x      CARDIAC MARKERS ( 02 Dec 2022 05:42 )  x     / x     / x     / 1921 U/L / x     / x          Serum Pro-Brain Natriuretic Peptide: 3355 pg/mL (11-30 @ 22:14)          Interpretation of Telemetry: AF average HR of 90, brief episodes of 120-130, PVCs          Overnight Events: None    No chest pain, shortness of breath, or palpitations              Current Meds:  aspirin  chewable 81 milliGRAM(s) Oral daily  atorvastatin 80 milliGRAM(s) Oral at bedtime  dextrose 5%. 1000 milliLiter(s) IV Continuous <Continuous>  dextrose 5%. 1000 milliLiter(s) IV Continuous <Continuous>  dextrose 50% Injectable 25 Gram(s) IV Push once  dextrose 50% Injectable 12.5 Gram(s) IV Push once  dextrose 50% Injectable 25 Gram(s) IV Push once  dextrose Oral Gel 15 Gram(s) Oral once PRN  glucagon  Injectable 1 milliGRAM(s) IntraMuscular once  heparin  Infusion.  Unit(s)/Hr IV Continuous <Continuous>  insulin glargine Injectable (LANTUS) 17 Unit(s) SubCutaneous at bedtime  insulin lispro (ADMELOG) corrective regimen sliding scale   SubCutaneous three times a day before meals  insulin lispro Injectable (ADMELOG) 6 Unit(s) SubCutaneous three times a day before meals  metoprolol succinate  milliGRAM(s) Oral daily    ROS:  Negative    PMH:  Unchanged       Vitals:  T(F): 97.7 (12-07), Max: 97.9 (12-06)  HR: 71 (12-07) (71 - 102)  BP: 148/87 (12-07) (119/68 - 148/87)  RR: 18 (12-07)  SpO2: 97% (12-07)    Physical Exam:  Appearance: No acute distress; well appearing  Eyes: pink conjunctiva  HEENT: Normal oral mucosa  Cardiovascular: Irregular rhythm, normal rate, normal S1, S2, no murmurs, rubs, or gallops; 1-2+ edema, no JVD   Respiratory: Clear to auscultation bilaterally  Gastrointestinal: soft, non-tender, non-distended   Musculoskeletal: No clubbing; no joint deformity   Neurologic: Normal speech, no facial asymmetry  Psychiatry: AAOx3, mood & affect appropriate  Skin: No rashes, ecchymoses, or cyanosis of exposed skin         I&O's Summary  06 Dec 2022 07:01  -  07 Dec 2022 07:00  --------------------------------------------------------  IN: 720 mL / OUT: 1500 mL / NET: -780 mL      LABS:                      11.6   4.00  )-----------( 207      ( 07 Dec 2022 05:43 )             37.3     12-07  141  |  108  |  33<H>  ----------------------------<  123<H>  4.5   |  23  |  1.97<H>    Ca    9.4      07 Dec 2022 05:45  Phos  3.8     12-07  Mg     2.1     12-07    TPro  6.4  /  Alb  3.7  /  TBili  0.2  /  DBili  x   /  AST  25  /  ALT  35  /  AlkPhos  39<L>  12-07    PT/INR - ( 07 Dec 2022 05:44 )   PT: 13.2 sec;   INR: 1.15 ratio    PTT - ( 07 Dec 2022 05:44 )  PTT:95.2 sec    CARDIAC MARKERS ( 06 Dec 2022 06:12 )  x     / x     / x     / 252 U/L / x     / x      CARDIAC MARKERS ( 05 Dec 2022 04:34 )  x     / x     / x     / 398 U/L / x     / x      CARDIAC MARKERS ( 04 Dec 2022 05:50 )  x     / x     / x     / 781 U/L / x     / x      CARDIAC MARKERS ( 03 Dec 2022 07:07 )  x     / x     / x     / 1277 U/L / x     / x      CARDIAC MARKERS ( 02 Dec 2022 05:42 )  x     / x     / x     / 1921 U/L / x     / x        Serum Pro-Brain Natriuretic Peptide: 3355 pg/mL (11-30 @ 22:14)      Interpretation of Telemetry: AF average HR of 90, brief episodes of 120-130, PVCs

## 2022-12-07 NOTE — PROGRESS NOTE ADULT - ATTENDING COMMENTS
Stage 3 CKD: Pt. 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 Pt. made aware that he is at increased risk of developing contrast associated TATI after receiving IV contrast during cardiac cath.  Scr at baseline, and noted to have improving volume status. Recommend to hold lasix on the day of cardiac catheterization.   Monitor BMP. Strict I/Os. Avoid nephrotoxins. Dose meds as per eGFR.     Microhematuria: reports chronic microhematuria., with bladder wall thickening.   Pt. also noted to have mild proteinuria.   Check C3, C4, DANDY, dsDNA, ANCA, HBSAg, HCV Ab, anti-GBM, SIFE.  Check urine cytology.  Will benefit from Urology evaluation.

## 2022-12-07 NOTE — PROGRESS NOTE ADULT - ASSESSMENT
73 y/o M PMH IDDM, HTN, HLD p/w SOB and night time "seizures" with associated hypoglycemia. SOB with associated elevated proBNP, orthopnea, and pleural effusion found to have ADHF (EF 39%) and poor insulin use at home. Currently pending cath by cardiology for new onset HF.

## 2022-12-07 NOTE — MEDICAL STUDENT PROGRESS NOTE(EDUCATION) - NS MD HP STUD ASPLAN ASSES FT
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
Patient is a 72 year old Male with PMHx of improperly controlled insulin-dependent T2DM, HTN, HLD, who presented with complaint of nocturnal "seizures"   in the setting of hypoglycemia, exertional dyspnea with orthopnea and PND. Found to have CHFrEF with EF=39% , TATI downtrending in setting of confirmed  Stage 3b CKD.

## 2022-12-07 NOTE — MEDICAL STUDENT PROGRESS NOTE(EDUCATION) - NS MD HP STUD ASPLAN PLAN FT
Problem 1: Acute decompensated heart failure  - Patient p/w history of orthopnea, PND, new onset exertional dyspnea, subjective weight gain in the past 1 month, with labs revealing elevated pro-BNP, all suggesting ADHF  -Echocardiogram (TTE) revealed HFrEF (EF=39%), severe concentric LV remodeling, moderate global LV systolic dysfunction, calcified aortic valve, in addition to borderline pulmonary HTN  -saturating >96% on room air  - Continue home metoprolol succinate 100mg   - on IV lasix 20mg daily for diuresis  - consider transition to Entresto when TAIT resolves and after cath  - Hold diuresis today; pt approaching euvolemia and already net negative 1600ml thus far.  - Strict I/Os and daily weights   - As per Cardiology, plan for coronary angiogram tomorrow since renal function has been improving; hold lasix today and tomorrow before procedure       Problem 2: Atrial fibrillation  - newly diagnosed AFib, mostly rate controlled  - as per tele nurse, HR 90s-120s overnight, no new PVC runs  -[id number] score 4 (4.8% stroke risk; start prophylactic anticoagulation)  - c/w metoprolol succinate 100mg   - can discharge on doac if covered by insurance    Problem 3: TATI on CKD  - Initial insult likely secondary to recent rhabdomyolysis which is resolving or CHF exacerbation  - CK significantly downtrending  - Cr 1.97 today, downtrending; trend daily  - CKD stage 3b as per patient;  ***As per PCP office, history of renal function is as follows: Oct 2020 BUN 22 Cr 1.71, Jan 2021 BUN 25 Cr 1.72, April 2021 BUN 27 Cr 1.73, Dec 2021 BUN 28 Cr 1.64, Mar 2022 BUN 34 Cr 1.84, Aug 2022 BUN 30 Cr 2.01  -Avoid nephrotoxic agents, dose meds per eGFR  -Continue to Trend BMPs  -As per Nephrology: If getting angiogram and If not fluid overloaded and okay from cardiac standpoint, would recommend fluids (1cc/kg/hr) 6 hours before and after angiogram. If not okay for IVF, would recommend holding diuresis on day of procedure.  - Nephrology send off workup including C3, C4, DANDY, dsDNA, ANCA, HBSAg, HCV Ab, anti-GBM, recommended Urology eval.    Problem 4: Insulin-dependent T2DM   - no further episodes of seizure-like activity while in the hospital  - His home regimen of insulin was Novolog (54 units) with meals and Levemir (80 units) qD as per his Endocrinologist's recommendation  - According to the patient's history, it is very likely that he has been improperly administering his medications while at home which would cause him to become hypoglycemic  - Weight based dose while in the hospital, starting on 0.3mg/kg/day, basal/bolus 17units at bedtime/6 units before meals  - Adjust insulin as needed based on sliding scale requirements  - Monitor fingerstick qhs  - HgA1c 9.8%, lipid panel w/n/l  - Consider adding SGLT2i in the setting of likely heart failure for more optimal GDMT    Problem 5: Cardiac Amyloidosis  - s/p amyloid Imaging study;  strongly suggestive of transthyretin cardiac amyloidosis. -Cardiology sending out amyloid genetic testing    Problem 6: Microscopic hematuria  - UA + Blood with 71 RBCs on admission; repeat UA + Blood with 117 RBCs  - Renal US showed nonobstructing left renal calculus and bladder wall thickening which can be seen in the setting of cystitis but does not correlate with UA  - Nephrology recommend Urology evaluation since may need cystoscopy r/o bladder CA    Problem 7: Transaminitis  - Mildly elevated LFTs on admission without history of liver disease, improving  - Initial insult likely secondary to rhabdomyolysis/muscle damage (cardiac or skeletal)  - continue to monitor    Problem 8: Elevated troponin  - Peak of 96 with increased CKMB; trend no longer needed  - likely secondary to type II NSTEMI    Problem 9: HTN  - Blood pressure has been well controlled since admission  - hold ACEi in the setting of TATI and CKD  - Consider switching amlodipine to other GDMT (e.g. SGLT2i)  - appreciate Cardiology recommendations    Problem 10: HLD  - patient reports no change in statin dosage over the past several years  - lipid panel w/n/l  - continue with atorvastatin 80mg    Problem 11: Preventative measures  - DVT prophylaxis with subcutaneous heparin  - diabetic diet

## 2022-12-07 NOTE — PROGRESS NOTE ADULT - SUBJECTIVE AND OBJECTIVE BOX
INTERVAL HPI/OVERNIGHT EVENTS:    SUBJECTIVE: Patient seen and examined at bedside.         OBJECTIVE:    VITAL SIGNS:  ICU Vital Signs Last 24 Hrs  T(C): 36.5 (07 Dec 2022 04:47), Max: 36.6 (06 Dec 2022 20:48)  T(F): 97.7 (07 Dec 2022 04:47), Max: 97.9 (06 Dec 2022 20:48)  HR: 71 (07 Dec 2022 04:47) (71 - 102)  BP: 148/87 (07 Dec 2022 04:47) (119/68 - 148/87)  BP(mean): --  ABP: --  ABP(mean): --  RR: 18 (07 Dec 2022 04:47) (18 - 18)  SpO2: 97% (07 Dec 2022 04:47) (97% - 98%)    O2 Parameters below as of 07 Dec 2022 04:47  Patient On (Oxygen Delivery Method): room air            Plateau pressure:   P/F ratio:     - @ 07:01  -   @ 07:00  --------------------------------------------------------  IN: 720 mL / OUT: 1500 mL / NET: -780 mL      CAPILLARY BLOOD GLUCOSE      POCT Blood Glucose.: 224 mg/dL (06 Dec 2022 21:00)    ECG:    PHYSICAL EXAM:    General: NAD  HEENT: clear conjunctiva  Neck: supple  Respiratory: CTA b/l  Cardiovascular: +S1/S2; RRR  Abdomen: soft, NT/ND; +BS x4  Extremities: 2+ peripheral pulses b/l; no LE edema  Skin: normal color and turgor; no rash  Neurological:    MEDICATIONS:  MEDICATIONS  (STANDING):  aspirin  chewable 81 milliGRAM(s) Oral daily  atorvastatin 80 milliGRAM(s) Oral at bedtime  dextrose 5%. 1000 milliLiter(s) (100 mL/Hr) IV Continuous <Continuous>  dextrose 5%. 1000 milliLiter(s) (50 mL/Hr) IV Continuous <Continuous>  dextrose 50% Injectable 25 Gram(s) IV Push once  dextrose 50% Injectable 12.5 Gram(s) IV Push once  dextrose 50% Injectable 25 Gram(s) IV Push once  glucagon  Injectable 1 milliGRAM(s) IntraMuscular once  heparin  Infusion.  Unit(s)/Hr (20 mL/Hr) IV Continuous <Continuous>  insulin glargine Injectable (LANTUS) 17 Unit(s) SubCutaneous at bedtime  insulin lispro (ADMELOG) corrective regimen sliding scale   SubCutaneous three times a day before meals  insulin lispro Injectable (ADMELOG) 6 Unit(s) SubCutaneous three times a day before meals  metoprolol succinate  milliGRAM(s) Oral daily    MEDICATIONS  (PRN):  dextrose Oral Gel 15 Gram(s) Oral once PRN Blood Glucose LESS THAN 70 milliGRAM(s)/deciliter      LABS:                        11.6   4.00  )-----------( 207      ( 07 Dec 2022 05:43 )             37.3         141  |  108  |  33<H>  ----------------------------<  123<H>  4.5   |  23  |  1.97<H>    Ca    9.4      07 Dec 2022 05:45  Phos  3.8       Mg     2.1         TPro  6.4  /  Alb  3.7  /  TBili  0.2  /  DBili  x   /  AST  25  /  ALT  35  /  AlkPhos  39<L>      PT/INR - ( 07 Dec 2022 05:44 )   PT: 13.2 sec;   INR: 1.15 ratio         PTT - ( 07 Dec 2022 05:44 )  PTT:95.2 sec  Urinalysis Basic - ( 05 Dec 2022 16:18 )    Color: Yellow / Appearance: Clear / S.021 / pH: x  Gluc: x / Ketone: Negative  / Bili: Negative / Urobili: Negative   Blood: x / Protein: 100 / Nitrite: Negative   Leuk Esterase: Negative / RBC: 117 /hpf / WBC 1 /HPF   Sq Epi: x / Non Sq Epi: 0 /hpf / Bacteria: Negative        RADIOLOGY & ADDITIONAL TESTS: Reviewed.     INTERVAL HPI/OVERNIGHT EVENTS:    SUBJECTIVE: Patient seen and examined at bedside. No acute overnight events.     OBJECTIVE:    VITAL SIGNS:  ICU Vital Signs Last 24 Hrs  T(C): 36.5 (07 Dec 2022 04:47), Max: 36.6 (06 Dec 2022 20:48)  T(F): 97.7 (07 Dec 2022 04:47), Max: 97.9 (06 Dec 2022 20:48)  HR: 71 (07 Dec 2022 04:47) (71 - 102)  BP: 148/87 (07 Dec 2022 04:47) (119/68 - 148/87)  BP(mean): --  ABP: --  ABP(mean): --  RR: 18 (07 Dec 2022 04:47) (18 - 18)  SpO2: 97% (07 Dec 2022 04:47) (97% - 98%)    O2 Parameters below as of 07 Dec 2022 04:47  Patient On (Oxygen Delivery Method): room air            Plateau pressure:   P/F ratio:     - @ 07:01  -   @ 07:00  --------------------------------------------------------  IN: 720 mL / OUT: 1500 mL / NET: -780 mL      CAPILLARY BLOOD GLUCOSE      POCT Blood Glucose.: 224 mg/dL (06 Dec 2022 21:00)    ECG:    PHYSICAL EXAM:    General: NAD  HEENT: clear conjunctiva  Neck: supple  Respiratory: CTA b/l  Cardiovascular: +S1/S2; RRR  Abdomen: soft, NT/ND; +BS x4  Extremities: 2+ peripheral pulses b/l; no LE edema  Skin: normal color and turgor; no rash  Neurological:    MEDICATIONS:  MEDICATIONS  (STANDING):  aspirin  chewable 81 milliGRAM(s) Oral daily  atorvastatin 80 milliGRAM(s) Oral at bedtime  dextrose 5%. 1000 milliLiter(s) (100 mL/Hr) IV Continuous <Continuous>  dextrose 5%. 1000 milliLiter(s) (50 mL/Hr) IV Continuous <Continuous>  dextrose 50% Injectable 25 Gram(s) IV Push once  dextrose 50% Injectable 12.5 Gram(s) IV Push once  dextrose 50% Injectable 25 Gram(s) IV Push once  glucagon  Injectable 1 milliGRAM(s) IntraMuscular once  heparin  Infusion.  Unit(s)/Hr (20 mL/Hr) IV Continuous <Continuous>  insulin glargine Injectable (LANTUS) 17 Unit(s) SubCutaneous at bedtime  insulin lispro (ADMELOG) corrective regimen sliding scale   SubCutaneous three times a day before meals  insulin lispro Injectable (ADMELOG) 6 Unit(s) SubCutaneous three times a day before meals  metoprolol succinate  milliGRAM(s) Oral daily    MEDICATIONS  (PRN):  dextrose Oral Gel 15 Gram(s) Oral once PRN Blood Glucose LESS THAN 70 milliGRAM(s)/deciliter      LABS:                        11.6   4.00  )-----------( 207      ( 07 Dec 2022 05:43 )             37.3         141  |  108  |  33<H>  ----------------------------<  123<H>  4.5   |  23  |  1.97<H>    Ca    9.4      07 Dec 2022 05:45  Phos  3.8       Mg     2.1         TPro  6.4  /  Alb  3.7  /  TBili  0.2  /  DBili  x   /  AST  25  /  ALT  35  /  AlkPhos  39<L>  12    PT/INR - ( 07 Dec 2022 05:44 )   PT: 13.2 sec;   INR: 1.15 ratio         PTT - ( 07 Dec 2022 05:44 )  PTT:95.2 sec  Urinalysis Basic - ( 05 Dec 2022 16:18 )    Color: Yellow / Appearance: Clear / S.021 / pH: x  Gluc: x / Ketone: Negative  / Bili: Negative / Urobili: Negative   Blood: x / Protein: 100 / Nitrite: Negative   Leuk Esterase: Negative / RBC: 117 /hpf / WBC 1 /HPF   Sq Epi: x / Non Sq Epi: 0 /hpf / Bacteria: Negative        RADIOLOGY & ADDITIONAL TESTS: Reviewed.     INTERVAL HPI/OVERNIGHT EVENTS:    SUBJECTIVE: Patient seen and examined at bedside. No acute overnight events. Patient planned for Angiogram tomorrow by Cardiology. Patient denies cp, sob, palpitations.     OBJECTIVE:    VITAL SIGNS:  ICU Vital Signs Last 24 Hrs  T(C): 36.5 (07 Dec 2022 04:47), Max: 36.6 (06 Dec 2022 20:48)  T(F): 97.7 (07 Dec 2022 04:47), Max: 97.9 (06 Dec 2022 20:48)  HR: 71 (07 Dec 2022 04:47) (71 - 102)  BP: 148/87 (07 Dec 2022 04:47) (119/68 - 148/87)  BP(mean): --  ABP: --  ABP(mean): --  RR: 18 (07 Dec 2022 04:47) (18 - 18)  SpO2: 97% (07 Dec 2022 04:47) (97% - 98%)    O2 Parameters below as of 07 Dec 2022 04:47  Patient On (Oxygen Delivery Method): room air    Plateau pressure:   P/F ratio:     12-06 @ 07:01  -  12-07 @ 07:00  --------------------------------------------------------  IN: 720 mL / OUT: 1500 mL / NET: -780 mL      CAPILLARY BLOOD GLUCOSE      POCT Blood Glucose.: 224 mg/dL (06 Dec 2022 21:00)    PHYSICAL EXAM:    General: NAD  HEENT: clear conjunctiva  Neck: supple  Respiratory: CTA b/l  Cardiovascular: +S1/S2; IRR  Abdomen: soft, NT/ND; +BS x4  Extremities: 2+ peripheral pulses b/l; 2+ LE edema  Skin: normal color and turgor; no rash  Neurological: AOx3    MEDICATIONS:  MEDICATIONS  (STANDING):  aspirin  chewable 81 milliGRAM(s) Oral daily  atorvastatin 80 milliGRAM(s) Oral at bedtime  dextrose 5%. 1000 milliLiter(s) (100 mL/Hr) IV Continuous <Continuous>  dextrose 5%. 1000 milliLiter(s) (50 mL/Hr) IV Continuous <Continuous>  dextrose 50% Injectable 25 Gram(s) IV Push once  dextrose 50% Injectable 12.5 Gram(s) IV Push once  dextrose 50% Injectable 25 Gram(s) IV Push once  glucagon  Injectable 1 milliGRAM(s) IntraMuscular once  heparin  Infusion.  Unit(s)/Hr (20 mL/Hr) IV Continuous <Continuous>  insulin glargine Injectable (LANTUS) 17 Unit(s) SubCutaneous at bedtime  insulin lispro (ADMELOG) corrective regimen sliding scale   SubCutaneous three times a day before meals  insulin lispro Injectable (ADMELOG) 6 Unit(s) SubCutaneous three times a day before meals  metoprolol succinate  milliGRAM(s) Oral daily    MEDICATIONS  (PRN):  dextrose Oral Gel 15 Gram(s) Oral once PRN Blood Glucose LESS THAN 70 milliGRAM(s)/deciliter      LABS:                        11.6   4.00  )-----------( 207      ( 07 Dec 2022 05:43 )             37.3     12-    141  |  108  |  33<H>  ----------------------------<  123<H>  4.5   |  23  |  1.97<H>    Ca    9.4      07 Dec 2022 05:45  Phos  3.8     12-  Mg     2.1     12-    TPro  6.4  /  Alb  3.7  /  TBili  0.2  /  DBili  x   /  AST  25  /  ALT  35  /  AlkPhos  39<L>  12-07    PT/INR - ( 07 Dec 2022 05:44 )   PT: 13.2 sec;   INR: 1.15 ratio         PTT - ( 07 Dec 2022 05:44 )  PTT:95.2 sec  Urinalysis Basic - ( 05 Dec 2022 16:18 )    Color: Yellow / Appearance: Clear / S.021 / pH: x  Gluc: x / Ketone: Negative  / Bili: Negative / Urobili: Negative   Blood: x / Protein: 100 / Nitrite: Negative   Leuk Esterase: Negative / RBC: 117 /hpf / WBC 1 /HPF   Sq Epi: x / Non Sq Epi: 0 /hpf / Bacteria: Negative        RADIOLOGY & ADDITIONAL TESTS: Reviewed.

## 2022-12-07 NOTE — PROGRESS NOTE ADULT - PROBLEM SELECTOR PLAN 3
Elevated CK and + Blood in urine c/f rhabdomyolysis i/s/o hypoglycemic seizures  -Cr 2.02 on presentation; unclear baseline as PCP said he has CKD3, and had Cr>2 on lab imaging in past  -CK significantly downtrending  - repeat UA+Microscopy with significant RBCs 117  - Nephro: TATI on CKD likely 2/2 recent rhabdomyolysis (resolving), CHF, or diuretics   - Nephro recs- give fluids 6 hrs before, 6 hrs after @ 1 cc/kg/hr if planned for angio and euvolemic, and hold diuretics on day of angiogram  - Kidney/Bladder U/S with nonobstructing L renal calculus and bladder wall thickening  [ ] f/u Nephro recs  [ ] f/u Urology consult Elevated CK and + Blood in urine c/f rhabdomyolysis i/s/o hypoglycemic seizures  -Cr 2.02 on presentation; unclear baseline as PCP said he has CKD3, and had Cr>2 on lab imaging in past  -CK significantly downtrending  - repeat UA+Microscopy with significant RBCs 117  - Nephro: TATI on CKD likely 2/2 recent rhabdomyolysis (resolving), CHF, or diuretics   - Nephro recs- give fluids 6 hrs before, 6 hrs after @ 1 cc/kg/hr if planned for angio and euvolemic, and hold diuretics on day of angiogram  - Kidney/Bladder U/S with nonobstructing L renal calculus and bladder wall thickening  [ ] f/u C3, C4, dsDNA, anti-GBM, DANDY, ANCA, hepatitis panel  [ ] f/u Urology consult  - Holding diuretics on day of cath

## 2022-12-07 NOTE — MEDICAL STUDENT PROGRESS NOTE(EDUCATION) - SUBJECTIVE AND OBJECTIVE BOX
PROGRESS NOTE:   Authored by Chaz Bonilla, MS3    Patient is a 72y old  Male who presents with a chief complaint of Hypoglycemia Episodes and Orthopnea with exertional SOB (07 Dec 2022 09:35). 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     Patient was seen and examined at bedside. He states that he had an uneventful night; he endorses having rested well but not sleeping great, is eating well, having regular bowel movements and urinating with no issues. He denies any new pain or sx of dizziness/lightheadedness, chest pain, palpitations, SOB, chest tightness, nausea, vomiting, abdominal pain.    SUBJECTIVE / OVERNIGHT EVENTS: no acute events overnight      MEDICATIONS  (STANDING):  aspirin  chewable 81 milliGRAM(s) Oral daily  atorvastatin 80 milliGRAM(s) Oral at bedtime  dextrose 5%. 1000 milliLiter(s) (100 mL/Hr) IV Continuous <Continuous>  dextrose 5%. 1000 milliLiter(s) (50 mL/Hr) IV Continuous <Continuous>  dextrose 50% Injectable 25 Gram(s) IV Push once  dextrose 50% Injectable 12.5 Gram(s) IV Push once  dextrose 50% Injectable 25 Gram(s) IV Push once  glucagon  Injectable 1 milliGRAM(s) IntraMuscular once  heparin  Infusion.  Unit(s)/Hr (20 mL/Hr) IV Continuous <Continuous>  insulin glargine Injectable (LANTUS) 17 Unit(s) SubCutaneous at bedtime  insulin lispro (ADMELOG) corrective regimen sliding scale   SubCutaneous three times a day before meals  insulin lispro Injectable (ADMELOG) 6 Unit(s) SubCutaneous three times a day before meals  metoprolol succinate  milliGRAM(s) Oral daily    MEDICATIONS  (PRN):  dextrose Oral Gel 15 Gram(s) Oral once PRN Blood Glucose LESS THAN 70 milliGRAM(s)/deciliter      CAPILLARY BLOOD GLUCOSE      POCT Blood Glucose.: 137 mg/dL (07 Dec 2022 11:33)  POCT Blood Glucose.: 160 mg/dL (07 Dec 2022 08:06)  POCT Blood Glucose.: 224 mg/dL (06 Dec 2022 21:00)  POCT Blood Glucose.: 94 mg/dL (06 Dec 2022 17:06)    I&O's Summary    06 Dec 2022 07:01  -  07 Dec 2022 07:00  --------------------------------------------------------  IN: 960 mL / OUT: 1500 mL / NET: -540 mL    07 Dec 2022 07:01  -  07 Dec 2022 15:14  --------------------------------------------------------  IN: 720 mL / OUT: 500 mL / NET: 220 mL        PHYSICAL EXAM:  Vital Signs Last 24 Hrs  T(C): 36.4 (07 Dec 2022 11:19), Max: 36.6 (06 Dec 2022 20:48)  T(F): 97.6 (07 Dec 2022 11:19), Max: 97.9 (06 Dec 2022 20:48)  HR: 105 (07 Dec 2022 11:19) (71 - 105)  BP: 140/85 (07 Dec 2022 11:19) (124/70 - 148/87)  BP(mean): --  RR: 18 (07 Dec 2022 11:19) (18 - 18)  SpO2: 100% (07 Dec 2022 11:19) (97% - 100%)    Parameters above as of 07 Dec 2022 11:19  Patient On (Oxygen Delivery Method): room air        GENERAL: No acute distress, well-developed, sitting in the chair reading comfortably  HEAD:  Atraumatic, Normocephalic  EYES: EOMI, PERRLA, conjunctiva and sclera clear  NECK: Supple, no lymphadenopathy, no JVD  CHEST/LUNG: CTAB; No wheezes, rales, or rhonchi  HEART: normal rate, irregular rhythm; normal S1,S2, No murmurs, rubs, or gallops  ABDOMEN: Soft/firm, non-tender, protuberant; normoactive bowel sounds in all 4 quadrants, no organomegaly  EXTREMITIES:  2+ peripheral pulses b/l, No clubbing, no cyanosis; 1+ pitting edema to the knees b/l, left slightly > right  NEUROLOGY: A&O x 3, no focal deficits; CN II-XII grossly intact  SKIN: No rashes or lesions    LABS:                        11.6   4.00  )-----------( 207      ( 07 Dec 2022 05:43 )             37.3         141  |  108  |  33<H>  ----------------------------<  123<H>  4.5   |  23  |  1.97<H>    Ca    9.4      07 Dec 2022 05:45  Phos  3.8       Mg     2.1         TPro  6.4  /  Alb  3.7  /  TBili  0.2  /  DBili  x   /  AST  25  /  ALT  35  /  AlkPhos  39<L>      PT/INR - ( 07 Dec 2022 05:44 )   PT: 13.2 sec;   INR: 1.15 ratio         PTT - ( 07 Dec 2022 05:44 )  PTT:95.2 sec  CARDIAC MARKERS ( 06 Dec 2022 06:12 )  x     / x     / 252 U/L / x     / x          Urinalysis Basic - ( 05 Dec 2022 16:18 )    Color: Yellow / Appearance: Clear / S.021 / pH: x  Gluc: x / Ketone: Negative  / Bili: Negative / Urobili: Negative   Blood: x / Protein: 100 / Nitrite: Negative   Leuk Esterase: Negative / RBC: 117 /hpf / WBC 1 /HPF   Sq Epi: x / Non Sq Epi: 0 /hpf / Bacteria: Negative

## 2022-12-08 LAB
ALBUMIN SERPL ELPH-MCNC: 3.6 G/DL — SIGNIFICANT CHANGE UP (ref 3.3–5)
ALP SERPL-CCNC: 39 U/L — LOW (ref 40–120)
ALT FLD-CCNC: 34 U/L — SIGNIFICANT CHANGE UP (ref 10–45)
ANA TITR SER: NEGATIVE — SIGNIFICANT CHANGE UP
ANION GAP SERPL CALC-SCNC: 10 MMOL/L — SIGNIFICANT CHANGE UP (ref 5–17)
APTT BLD: 99.8 SEC — HIGH (ref 27.5–35.5)
AST SERPL-CCNC: 23 U/L — SIGNIFICANT CHANGE UP (ref 10–40)
AUTO DIFF PNL BLD: NEGATIVE — SIGNIFICANT CHANGE UP
BASOPHILS # BLD AUTO: 0.04 K/UL — SIGNIFICANT CHANGE UP (ref 0–0.2)
BASOPHILS NFR BLD AUTO: 1 % — SIGNIFICANT CHANGE UP (ref 0–2)
BILIRUB SERPL-MCNC: 0.2 MG/DL — SIGNIFICANT CHANGE UP (ref 0.2–1.2)
BLD GP AB SCN SERPL QL: NEGATIVE — SIGNIFICANT CHANGE UP
BUN SERPL-MCNC: 24 MG/DL — HIGH (ref 7–23)
C-ANCA SER-ACNC: NEGATIVE — SIGNIFICANT CHANGE UP
CALCIUM SERPL-MCNC: 9.4 MG/DL — SIGNIFICANT CHANGE UP (ref 8.4–10.5)
CHLORIDE SERPL-SCNC: 107 MMOL/L — SIGNIFICANT CHANGE UP (ref 96–108)
CO2 SERPL-SCNC: 23 MMOL/L — SIGNIFICANT CHANGE UP (ref 22–31)
CREAT SERPL-MCNC: 1.81 MG/DL — HIGH (ref 0.5–1.3)
DSDNA AB SER-ACNC: <12 IU/ML — SIGNIFICANT CHANGE UP
EGFR: 40 ML/MIN/1.73M2 — LOW
EOSINOPHIL # BLD AUTO: 0.12 K/UL — SIGNIFICANT CHANGE UP (ref 0–0.5)
EOSINOPHIL NFR BLD AUTO: 3.1 % — SIGNIFICANT CHANGE UP (ref 0–6)
GBM IGG SER-ACNC: <0.2 — SIGNIFICANT CHANGE UP (ref 0–0.9)
GLUCOSE BLDC GLUCOMTR-MCNC: 126 MG/DL — HIGH (ref 70–99)
GLUCOSE BLDC GLUCOMTR-MCNC: 170 MG/DL — HIGH (ref 70–99)
GLUCOSE BLDC GLUCOMTR-MCNC: 256 MG/DL — HIGH (ref 70–99)
GLUCOSE BLDC GLUCOMTR-MCNC: 96 MG/DL — SIGNIFICANT CHANGE UP (ref 70–99)
GLUCOSE SERPL-MCNC: 116 MG/DL — HIGH (ref 70–99)
HCT VFR BLD CALC: 35.9 % — LOW (ref 39–50)
HGB BLD-MCNC: 11.3 G/DL — LOW (ref 13–17)
IMM GRANULOCYTES NFR BLD AUTO: 0.5 % — SIGNIFICANT CHANGE UP (ref 0–0.9)
INR BLD: 1.18 RATIO — HIGH (ref 0.88–1.16)
LYMPHOCYTES # BLD AUTO: 1.22 K/UL — SIGNIFICANT CHANGE UP (ref 1–3.3)
LYMPHOCYTES # BLD AUTO: 31.8 % — SIGNIFICANT CHANGE UP (ref 13–44)
M PROTEIN 24H UR ELPH-MRATE: SIGNIFICANT CHANGE UP
MAGNESIUM SERPL-MCNC: 2.1 MG/DL — SIGNIFICANT CHANGE UP (ref 1.6–2.6)
MCHC RBC-ENTMCNC: 29.3 PG — SIGNIFICANT CHANGE UP (ref 27–34)
MCHC RBC-ENTMCNC: 31.5 GM/DL — LOW (ref 32–36)
MCV RBC AUTO: 93 FL — SIGNIFICANT CHANGE UP (ref 80–100)
MONOCYTES # BLD AUTO: 0.37 K/UL — SIGNIFICANT CHANGE UP (ref 0–0.9)
MONOCYTES NFR BLD AUTO: 9.6 % — SIGNIFICANT CHANGE UP (ref 2–14)
MPO AB + PR3 PNL SER: SIGNIFICANT CHANGE UP
NEUTROPHILS # BLD AUTO: 2.07 K/UL — SIGNIFICANT CHANGE UP (ref 1.8–7.4)
NEUTROPHILS NFR BLD AUTO: 54 % — SIGNIFICANT CHANGE UP (ref 43–77)
NRBC # BLD: 0 /100 WBCS — SIGNIFICANT CHANGE UP (ref 0–0)
P-ANCA SER-ACNC: NEGATIVE — SIGNIFICANT CHANGE UP
PHOSPHATE SERPL-MCNC: 4.2 MG/DL — SIGNIFICANT CHANGE UP (ref 2.5–4.5)
PLATELET # BLD AUTO: 198 K/UL — SIGNIFICANT CHANGE UP (ref 150–400)
POTASSIUM SERPL-MCNC: 4.7 MMOL/L — SIGNIFICANT CHANGE UP (ref 3.5–5.3)
POTASSIUM SERPL-SCNC: 4.7 MMOL/L — SIGNIFICANT CHANGE UP (ref 3.5–5.3)
PROT SERPL-MCNC: 6.2 G/DL — SIGNIFICANT CHANGE UP (ref 6–8.3)
PROTHROM AB SERPL-ACNC: 13.7 SEC — HIGH (ref 10.5–13.4)
RBC # BLD: 3.86 M/UL — LOW (ref 4.2–5.8)
RBC # FLD: 14 % — SIGNIFICANT CHANGE UP (ref 10.3–14.5)
RH IG SCN BLD-IMP: POSITIVE — SIGNIFICANT CHANGE UP
SARS-COV-2 RNA SPEC QL NAA+PROBE: SIGNIFICANT CHANGE UP
SODIUM SERPL-SCNC: 140 MMOL/L — SIGNIFICANT CHANGE UP (ref 135–145)
WBC # BLD: 3.84 K/UL — SIGNIFICANT CHANGE UP (ref 3.8–10.5)
WBC # FLD AUTO: 3.84 K/UL — SIGNIFICANT CHANGE UP (ref 3.8–10.5)

## 2022-12-08 PROCEDURE — 93458 L HRT ARTERY/VENTRICLE ANGIO: CPT | Mod: 26

## 2022-12-08 PROCEDURE — 99233 SBSQ HOSP IP/OBS HIGH 50: CPT | Mod: GC

## 2022-12-08 PROCEDURE — 99152 MOD SED SAME PHYS/QHP 5/>YRS: CPT

## 2022-12-08 PROCEDURE — 99232 SBSQ HOSP IP/OBS MODERATE 35: CPT | Mod: GC

## 2022-12-08 PROCEDURE — 93010 ELECTROCARDIOGRAM REPORT: CPT

## 2022-12-08 RX ORDER — INSULIN LISPRO 100/ML
VIAL (ML) SUBCUTANEOUS AT BEDTIME
Refills: 0 | Status: DISCONTINUED | OUTPATIENT
Start: 2022-12-08 | End: 2022-12-10

## 2022-12-08 RX ORDER — HEPARIN SODIUM 5000 [USP'U]/ML
INJECTION INTRAVENOUS; SUBCUTANEOUS
Qty: 25000 | Refills: 0 | Status: DISCONTINUED | OUTPATIENT
Start: 2022-12-08 | End: 2022-12-09

## 2022-12-08 RX ORDER — HEPARIN SODIUM 5000 [USP'U]/ML
INJECTION INTRAVENOUS; SUBCUTANEOUS
Qty: 25000 | Refills: 0 | Status: DISCONTINUED | OUTPATIENT
Start: 2022-12-08 | End: 2022-12-08

## 2022-12-08 RX ORDER — HEPARIN SODIUM 5000 [USP'U]/ML
9000 INJECTION INTRAVENOUS; SUBCUTANEOUS EVERY 6 HOURS
Refills: 0 | Status: DISCONTINUED | OUTPATIENT
Start: 2022-12-08 | End: 2022-12-09

## 2022-12-08 RX ORDER — HEPARIN SODIUM 5000 [USP'U]/ML
4500 INJECTION INTRAVENOUS; SUBCUTANEOUS EVERY 6 HOURS
Refills: 0 | Status: DISCONTINUED | OUTPATIENT
Start: 2022-12-08 | End: 2022-12-09

## 2022-12-08 RX ADMIN — INSULIN GLARGINE 17 UNIT(S): 100 INJECTION, SOLUTION SUBCUTANEOUS at 21:47

## 2022-12-08 RX ADMIN — Medication 6 UNIT(S): at 12:09

## 2022-12-08 RX ADMIN — Medication 1: at 22:08

## 2022-12-08 RX ADMIN — ATORVASTATIN CALCIUM 80 MILLIGRAM(S): 80 TABLET, FILM COATED ORAL at 21:47

## 2022-12-08 RX ADMIN — Medication 100 MILLIGRAM(S): at 05:07

## 2022-12-08 RX ADMIN — Medication 6 UNIT(S): at 18:01

## 2022-12-08 RX ADMIN — HEPARIN SODIUM 2000 UNIT(S)/HR: 5000 INJECTION INTRAVENOUS; SUBCUTANEOUS at 23:03

## 2022-12-08 RX ADMIN — Medication 1: at 12:08

## 2022-12-08 RX ADMIN — Medication 81 MILLIGRAM(S): at 12:09

## 2022-12-08 RX ADMIN — HEPARIN SODIUM 2000 UNIT(S)/HR: 5000 INJECTION INTRAVENOUS; SUBCUTANEOUS at 09:14

## 2022-12-08 RX ADMIN — Medication 6 UNIT(S): at 09:13

## 2022-12-08 NOTE — PROGRESS NOTE ADULT - SUBJECTIVE AND OBJECTIVE BOX
INTERVAL HPI/OVERNIGHT EVENTS:    SUBJECTIVE: Patient seen and examined at bedside.         OBJECTIVE:    VITAL SIGNS:  ICU Vital Signs Last 24 Hrs  T(C): 36.7 (08 Dec 2022 04:26), Max: 36.7 (08 Dec 2022 04:26)  T(F): 98.1 (08 Dec 2022 04:26), Max: 98.1 (08 Dec 2022 04:26)  HR: 71 (08 Dec 2022 04:26) (71 - 105)  BP: 117/76 (08 Dec 2022 04:26) (117/76 - 140/85)  BP(mean): --  ABP: --  ABP(mean): --  RR: 16 (08 Dec 2022 04:26) (16 - 20)  SpO2: 95% (08 Dec 2022 04:26) (95% - 100%)    O2 Parameters below as of 08 Dec 2022 04:26  Patient On (Oxygen Delivery Method): room air            Plateau pressure:   P/F ratio:     12-07 @ 07:01  -  12-08 @ 07:00  --------------------------------------------------------  IN: 1080 mL / OUT: 2310 mL / NET: -1230 mL      CAPILLARY BLOOD GLUCOSE      POCT Blood Glucose.: 166 mg/dL (07 Dec 2022 21:35)    ECG:    PHYSICAL EXAM:    General: NAD  HEENT: clear conjunctiva  Neck: supple  Respiratory: CTA b/l  Cardiovascular: +S1/S2; RRR  Abdomen: soft, NT/ND; +BS x4  Extremities: 2+ peripheral pulses b/l; no LE edema  Skin: normal color and turgor; no rash  Neurological:    MEDICATIONS:  MEDICATIONS  (STANDING):  aspirin  chewable 81 milliGRAM(s) Oral daily  atorvastatin 80 milliGRAM(s) Oral at bedtime  dextrose 5%. 1000 milliLiter(s) (100 mL/Hr) IV Continuous <Continuous>  dextrose 5%. 1000 milliLiter(s) (50 mL/Hr) IV Continuous <Continuous>  dextrose 50% Injectable 25 Gram(s) IV Push once  dextrose 50% Injectable 12.5 Gram(s) IV Push once  dextrose 50% Injectable 25 Gram(s) IV Push once  glucagon  Injectable 1 milliGRAM(s) IntraMuscular once  heparin  Infusion.  Unit(s)/Hr (20 mL/Hr) IV Continuous <Continuous>  insulin glargine Injectable (LANTUS) 17 Unit(s) SubCutaneous at bedtime  insulin lispro (ADMELOG) corrective regimen sliding scale   SubCutaneous three times a day before meals  insulin lispro Injectable (ADMELOG) 6 Unit(s) SubCutaneous three times a day before meals  metoprolol succinate  milliGRAM(s) Oral daily    MEDICATIONS  (PRN):  dextrose Oral Gel 15 Gram(s) Oral once PRN Blood Glucose LESS THAN 70 milliGRAM(s)/deciliter      LABS:                        11.3   3.84  )-----------( 198      ( 08 Dec 2022 06:08 )             35.9     12-08    140  |  107  |  24<H>  ----------------------------<  116<H>  4.7   |  23  |  1.81<H>    Ca    9.4      08 Dec 2022 06:08  Phos  4.2     12-08  Mg     2.1     12-08    TPro  6.2  /  Alb  3.6  /  TBili  0.2  /  DBili  x   /  AST  23  /  ALT  34  /  AlkPhos  39<L>  12-08    PT/INR - ( 08 Dec 2022 06:10 )   PT: 13.7 sec;   INR: 1.18 ratio         PTT - ( 08 Dec 2022 06:10 )  PTT:99.8 sec      RADIOLOGY & ADDITIONAL TESTS: Reviewed.     INTERVAL HPI/OVERNIGHT EVENTS:    SUBJECTIVE: Patient seen and examined at bedside. No acute overnight events. Patient feels well in AM, and denies cp, sob. Patient still making significant amount of urine.     OBJECTIVE:    VITAL SIGNS:  ICU Vital Signs Last 24 Hrs  T(C): 36.7 (08 Dec 2022 04:26), Max: 36.7 (08 Dec 2022 04:26)  T(F): 98.1 (08 Dec 2022 04:26), Max: 98.1 (08 Dec 2022 04:26)  HR: 71 (08 Dec 2022 04:26) (71 - 105)  BP: 117/76 (08 Dec 2022 04:26) (117/76 - 140/85)  BP(mean): --  ABP: --  ABP(mean): --  RR: 16 (08 Dec 2022 04:26) (16 - 20)  SpO2: 95% (08 Dec 2022 04:26) (95% - 100%)    O2 Parameters below as of 08 Dec 2022 04:26  Patient On (Oxygen Delivery Method): room air      Plateau pressure:   P/F ratio:     12-07 @ 07:01  -  12-08 @ 07:00  --------------------------------------------------------  IN: 1080 mL / OUT: 2310 mL / NET: -1230 mL      CAPILLARY BLOOD GLUCOSE      POCT Blood Glucose.: 166 mg/dL (07 Dec 2022 21:35)      PHYSICAL EXAM:    General: NAD  HEENT: clear conjunctiva  Neck: supple  Respiratory: CTA b/l  Cardiovascular: +S1/S2; RRR  Abdomen: soft, NT/ND; +BS x4  Extremities: 2+ peripheral pulses b/l; 2+ lower extremity edema  Skin: normal color and turgor; no rash  Neurological: AOX3    MEDICATIONS:  MEDICATIONS  (STANDING):  aspirin  chewable 81 milliGRAM(s) Oral daily  atorvastatin 80 milliGRAM(s) Oral at bedtime  dextrose 5%. 1000 milliLiter(s) (100 mL/Hr) IV Continuous <Continuous>  dextrose 5%. 1000 milliLiter(s) (50 mL/Hr) IV Continuous <Continuous>  dextrose 50% Injectable 25 Gram(s) IV Push once  dextrose 50% Injectable 12.5 Gram(s) IV Push once  dextrose 50% Injectable 25 Gram(s) IV Push once  glucagon  Injectable 1 milliGRAM(s) IntraMuscular once  heparin  Infusion.  Unit(s)/Hr (20 mL/Hr) IV Continuous <Continuous>  insulin glargine Injectable (LANTUS) 17 Unit(s) SubCutaneous at bedtime  insulin lispro (ADMELOG) corrective regimen sliding scale   SubCutaneous three times a day before meals  insulin lispro Injectable (ADMELOG) 6 Unit(s) SubCutaneous three times a day before meals  metoprolol succinate  milliGRAM(s) Oral daily    MEDICATIONS  (PRN):  dextrose Oral Gel 15 Gram(s) Oral once PRN Blood Glucose LESS THAN 70 milliGRAM(s)/deciliter      LABS:                        11.3   3.84  )-----------( 198      ( 08 Dec 2022 06:08 )             35.9     12-08    140  |  107  |  24<H>  ----------------------------<  116<H>  4.7   |  23  |  1.81<H>    Ca    9.4      08 Dec 2022 06:08  Phos  4.2     12-08  Mg     2.1     12-08    TPro  6.2  /  Alb  3.6  /  TBili  0.2  /  DBili  x   /  AST  23  /  ALT  34  /  AlkPhos  39<L>  12-08    PT/INR - ( 08 Dec 2022 06:10 )   PT: 13.7 sec;   INR: 1.18 ratio         PTT - ( 08 Dec 2022 06:10 )  PTT:99.8 sec      RADIOLOGY & ADDITIONAL TESTS: Reviewed.

## 2022-12-08 NOTE — PROGRESS NOTE ADULT - SUBJECTIVE AND OBJECTIVE BOX
[facility] DIVISION OF KIDNEY DISEASES AND HYPERTENSION   FOLLOW UP NOTE    --------------------------------------------------------------------------------  Chief Complaint:    24 hour events/subjective: Pt. was seen and examined today. No acute overnight events. Pt notes might be going for angiogram today. Denies orthopnea, PND, GUILLEN, dysuria, gross hematuria.      PAST HISTORY  --------------------------------------------------------------------------------  No significant changes to PMH, PSH, FHx, SHx, unless otherwise noted    ALLERGIES & MEDICATIONS  --------------------------------------------------------------------------------  Allergies    No Known Allergies    Intolerances      Standing Inpatient Medications  aspirin  chewable 81 milliGRAM(s) Oral daily  atorvastatin 80 milliGRAM(s) Oral at bedtime  dextrose 5%. 1000 milliLiter(s) IV Continuous <Continuous>  dextrose 5%. 1000 milliLiter(s) IV Continuous <Continuous>  dextrose 50% Injectable 25 Gram(s) IV Push once  dextrose 50% Injectable 12.5 Gram(s) IV Push once  dextrose 50% Injectable 25 Gram(s) IV Push once  glucagon  Injectable 1 milliGRAM(s) IntraMuscular once  heparin  Infusion.  Unit(s)/Hr IV Continuous <Continuous>  insulin glargine Injectable (LANTUS) 17 Unit(s) SubCutaneous at bedtime  insulin lispro (ADMELOG) corrective regimen sliding scale   SubCutaneous three times a day before meals  insulin lispro Injectable (ADMELOG) 6 Unit(s) SubCutaneous three times a day before meals  metoprolol succinate  milliGRAM(s) Oral daily    PRN Inpatient Medications  dextrose Oral Gel 15 Gram(s) Oral once PRN      REVIEW OF SYSTEMS  --------------------------------------------------------------------------------  Gen: No fevers/chills  Head/Eyes/Ears: No HA   Respiratory: No dyspnea, cough  CV: No chest pain  : No dysuria, hematuria  MSK: unchanged edema  Skin: No rashes    All other systems were reviewed and are negative, except as noted.    VITALS/PHYSICAL EXAM  --------------------------------------------------------------------------------  T(C): 36.7 (12-08-22 @ 04:26), Max: 36.7 (12-08-22 @ 04:26)  HR: 71 (12-08-22 @ 04:26) (71 - 105)  BP: 117/76 (12-08-22 @ 04:26) (117/76 - 140/85)  RR: 16 (12-08-22 @ 04:26) (16 - 20)  SpO2: 95% (12-08-22 @ 04:26) (95% - 100%)  Wt(kg): --        12-07-22 @ 07:01  -  12-08-22 @ 07:00  --------------------------------------------------------  IN: 1080 mL / OUT: 2310 mL / NET: -1230 mL        Physical Exam:  	Gen: NAD  	HEENT: Anicteric, EOMI  	Pulm: CTA B/L  	CV: S1S2+  	Ext: b/l LE edema 2+ to knees  	Neuro: Awake, alert, oriented x3, BASURTO, EOMI  	Skin: Warm and dry      LABS/STUDIES  --------------------------------------------------------------------------------              11.3   3.84  >-----------<  198      [12-08-22 @ 06:08]              35.9     140  |  107  |  24  ----------------------------<  116      [12-08-22 @ 06:08]  4.7   |  23  |  1.81        Ca     9.4     [12-08-22 @ 06:08]      Mg     2.1     [12-08-22 @ 06:08]      Phos  4.2     [12-08-22 @ 06:08]    TPro  6.2  /  Alb  3.6  /  TBili  0.2  /  DBili  x   /  AST  23  /  ALT  34  /  AlkPhos  39  [12-08-22 @ 06:08]    PT/INR: PT 13.7 , INR 1.18       [12-08-22 @ 06:10]  PTT: 99.8       [12-08-22 @ 06:10]      Creatinine Trend:  SCr 1.81 [12-08 @ 06:08]  SCr 1.97 [12-07 @ 05:45]  SCr 2.13 [12-06 @ 06:12]  SCr 2.06 [12-05 @ 04:34]  SCr 2.07 [12-04 @ 05:50]    Urinalysis - [12-05-22 @ 16:18]      Color Yellow / Appearance Clear / SG 1.021 / pH 6.0      Gluc Negative / Ketone Negative  / Bili Negative / Urobili Negative       Blood Large / Protein 100 / Leuk Est Negative / Nitrite Negative       / WBC 1 / Hyaline 1 / Gran  / Sq Epi  / Non Sq Epi 0 / Bacteria Negative    Urine Creatinine 178      [12-05-22 @ 16:33]  Urine Protein 69      [12-05-22 @ 16:33]  Urine Urea Nitrogen 1112      [12-05-22 @ 16:32]  Urine Osmolality 633      [12-05-22 @ 16:33]    HBsAg Nonreact      [12-07-22 @ 09:44]  HCV 0.06, Nonreact      [12-07-22 @ 09:44]    C3 Complement 158      [12-07-22 @ 09:44]  C4 Complement 58      [12-07-22 @ 09:44]  Free Light Chains: kappa 4.11, lambda 2.75, ratio = 1.49      [12-04 @ 05:51]

## 2022-12-08 NOTE — PROGRESS NOTE ADULT - ASSESSMENT
73 yo male with a PMH of DM, HTN, HLD & hypoglycemic seizures. Presented to the ED with dyspnea and orthopnea and was admitted with heart failure exacerbation.      IMP:  1. New onset CHFrEF  2. ?New onset AF   - CXR 11/30: Cardiac size is within normal limits. Trace right sided pleural effusion  - TTE 12/1: EF 39%, Calcified aortic valve. The non and the left cusp appear functionally fused by calcification. Peak transaortic valve gradient equals 9 mm Hg, estimated aortic valve area equals 2.3 sqcm. Moderate  global left ventricular systolic dysfunction.  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  -Per nephrology, pt given diagnosis of CKD stage III, in the setting of DM and nephrolithiasis. Cr improving and baseline ~2.    - Will discuss plan for angiogram on 12/8 with interventional, Cr stable and improved   - Blood sample for further amyloid genetics blood testing obtained     Silvia Orr MD  Cardiology Fellow     Recommendations are preliminary until cosigned by attending    71 yo male with a PMH of DM, HTN, HLD & hypoglycemic seizures. Presented to the ED with dyspnea and orthopnea and was admitted with heart failure exacerbation.      IMP:  1. New onset CHFrEF  2. ?New onset AF   - CXR 11/30: Cardiac size is within normal limits. Trace right sided pleural effusion  - TTE 12/1: EF 39%, Calcified aortic valve. The non and the left cusp appear functionally fused by calcification. Peak transaortic valve gradient equals 9 mm Hg, estimated aortic valve area equals 2.3 sqcm. Moderate  global left ventricular systolic dysfunction.  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  - Per nephrology, pt given diagnosis of CKD stage III, in the setting of DM and nephrolithiasis. Cr improving and baseline ~2.    - Will discuss plan for angiogram on 12/8 with interventional, Cr stable and improved   - Blood sample for further amyloid genetics blood testing obtained     Silvia Orr MD  Cardiology Fellow     Plan discussed with cardiology fellow.  Patient seen and examined.  Hx., exam and labs as above.  I agree with the assessment and recommendations, which I have reviewed and edited where appropriate.  Parvez Bustamante M.D.  Cardiology Attending, Consult Service    For Cardiology consults and questions, all Cardiology service information can be found 24/7 on [email or web address] - use password: [email or web address] to log in.

## 2022-12-08 NOTE — PROGRESS NOTE ADULT - PROBLEM SELECTOR PLAN 1
Orthopnea, elevated proBNP, pulmonary edema suggestive of ADHF  -TTE showed HFrEF (EF 39%)  -on room air  - Card recd IV Lasix 20 dosed daily (held today)  -C/w Toprol  -holding ACE-I i/s/o TATI --> consider switching to ARB/ARNI this admission once renal function improves   - add on SGLT2i for GDMT  -cardiology consulted for evaluation of new onset HF  -Angiogram Monday 12/5 deferred by Cards until further nephro input  - Tc-99m-PYP scan for amyloidosis- strongly suggestive  - Kappa/Thom ratio wnl   - plan for cardiac cath 12/8 with downtrending Cr   [ ] f/u genetic testing for amyloidosis Orthopnea, elevated proBNP, pulmonary edema suggestive of ADHF  -TTE showed HFrEF (EF 39%)  -on room air  - Card recd IV Lasix 20 dosed daily (held today)  -C/w Toprol  -holding ACE-I i/s/o TATI --> consider switching to ARB/ARNI this admission once renal function improves   - add on SGLT2i for GDMT  -cardiology consulted for evaluation of new onset HF  -Angiogram Monday 12/5 deferred by Cards until further nephro input  - Tc-99m-PYP scan for amyloidosis- strongly suggestive  - Kappa/Thom ratio wnl   [ ] f/u Cardiac Cath 12/8   [ ] f/u genetic testing for amyloidosis

## 2022-12-08 NOTE — PROGRESS NOTE ADULT - ATTENDING COMMENTS
2M PMHx IDDM (novolog and levemir), HTN, HLD p/w night time seizure like activity with episodes x2-3/last 2 weeks a/w hypoglycemia (Bs 30s-50s) as well as orthopnea and GUILLEN. CK initially 5173. Renal consulted for TATI with plans for angiogram.  No complaints today  1.  ARF on CKD3--DM chronci +/- cardiac amyloidosis.  Volume optimization with strong suspicion of acute cardiorenal component.  Contrast protection as outlined based on optimization of volume status (can check BNP, lung POCUS)  2,  Proteinuria--w/u largely unrevealing.  DM, amyloid leading contenders.   3.  HYpoglycemia--likely related to LONG ACTING insulin (glargine may not be best choice vs detemir, NPH) in setting of 1.  Review with endocrine agent, dosingI glargine maintained may be safer to give am rather than late pm    discussed with med team  Ken Holden MD  contact me on TEAMS

## 2022-12-08 NOTE — PROGRESS NOTE ADULT - PROBLEM SELECTOR PLAN 3
Elevated CK and + Blood in urine c/f rhabdomyolysis i/s/o hypoglycemic seizures  -Cr 2.02 on presentation; unclear baseline as PCP said he has CKD3, and had Cr>2 on lab imaging in past  -CK significantly downtrending  - repeat UA+Microscopy with significant RBCs 117  - Nephro: TATI on CKD likely 2/2 recent rhabdomyolysis (resolving), CHF, or diuretics   - Nephro recs- give fluids 6 hrs before, 6 hrs after @ 1 cc/kg/hr if planned for angio and euvolemic, and hold diuretics on day of angiogram  - Kidney/Bladder U/S with nonobstructing L renal calculus and bladder wall thickening  [ ] f/u C3, C4, dsDNA, anti-GBM, DANDY, ANCA, hepatitis panel  [ ] f/u Urology consult  - Holding diuretics on day of cath Elevated CK and + Blood in urine c/f rhabdomyolysis i/s/o hypoglycemic seizures  -Cr 2.02 on presentation; unclear baseline as PCP said he has CKD3, and had Cr>2 on lab imaging in past  -CK significantly downtrending  - repeat UA+Microscopy with significant RBCs 117  - Nephro: TATI on CKD likely 2/2 recent rhabdomyolysis (resolving), CHF, or diuretics   - Nephro recs- give fluids 6 hrs before, 6 hrs after @ 1 cc/kg/hr if planned for angio and euvolemic, and hold diuretics on day of angiogram  - Kidney/Bladder U/S with nonobstructing L renal calculus and bladder wall thickening  [ ] f/u C3 mildly elevated, C4 mildly elevated, dsDNA, anti-GBM, DANDY, ANCA, hepatitis panel- negative  - urology f/u OP for cystoscopy in setting of microhematuria, bladder wall thickening   - Holding diuretics on day of cath Elevated CK and + Blood in urine c/f rhabdomyolysis i/s/o hypoglycemic seizures  -Cr 2.02 on presentation; unclear baseline as PCP said he has CKD3, and had Cr>2 on lab imaging in past  -CK significantly downtrending  - repeat UA+Microscopy with significant RBCs 117  - Nephro: TATI on CKD likely 2/2 recent rhabdomyolysis (resolving), CHF, or diuretics   - Nephro recs- give fluids 6 hrs before, 6 hrs after @ 1 cc/kg/hr if planned for angio and euvolemic, and hold diuretics on day of angiogram  - Kidney/Bladder U/S with nonobstructing L renal calculus and bladder wall thickening  [ ] f/u C3 mildly elevated, C4 mildly elevated, dsDNA, anti-GBM, DANDY, ANCA, hepatitis panel- negative  - urology f/u OP for cystoscopy in setting of microhematuria, bladder wall thickening   - Holding diuretics on day of cath  - Cr downtrending

## 2022-12-08 NOTE — PROGRESS NOTE ADULT - ASSESSMENT
72M PMHx IDDM (novolog and levemir), HTN, HLD p/w night time seizure like activity with episodes x2-3/last 2 weeks a/w hypoglycemia (Bs 30s-50s) as well as orthopnea and GUILLEN. CK initially 5173. Renal consulted for TATI with plans for angiogram    Impression: resolving TATI on CKD iso diuresis, CHF, and recent Rhabdo    Plan not yet discussed with attending Dr. Campos    #TATI on CKD  Cr Baseline confirmed w/ pcp Dr. May Antonio, 2.01 on august 4th of this year  Cr was 2.4 in 2018 iso of nephrolithiasis. Allscripts reviewed without more lab information.  Cr here: Min 1.69, max 2.13  Cr now downtrending 1.81<-1.97 <- 2.13 <- 2.06 <- 2.07 <- 2.09 <-1.89<-1.69<-2.01 though numbers below baseline may be iso fluid overload  Urine osmol 633, TP 69, Cr 178, TP/CR 0.4, urea 1112  UA + Blood with 71 RBCs; repeat UA +117 RBCs  Renal US: Nonobstructing left renal calculus. 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 Current lab values c/w stage 3 and similar to baseline Cr CKD.  Risk of Contrast induced rise in creatinine elevated in pt with CKD, DM, age >60, HTN  -Avoid nephrotoxic agents, dose meds per eGFR  -Continue to Trend BMPs  -If getting angiogram and If euvolemic on day of study and okay from cardiac standpoint, would recommend fluids (1cc/kg/hr) 6 hours before and after angiogram. If fluid overloaded, would recommend to continue holding diuresis on day of procedure     #MicroHematuria  with proteinuria, prior skin cancer s/p radiation, former smoker, 72male  per pt has had issue before and told f/u urology  UA with RBCx2 (71 -->117)  Renal US: Nonobstructing left renal calculus. Bladder wall thickening   Hepatitis panel negative for infection, hepatitis c nonreactive, C3 158, C4 56 both mildly elevated, SIFE ratio wnl  -Urology eval, can be outpt, pt aware and states will make appointment after scheduled f/u with PCP  -F/U DANDY, dsDNA, ANCA, anti-GBM, and urine cytology  72M PMHx IDDM (novolog and levemir), HTN, HLD p/w night time seizure like activity with episodes x2-3/last 2 weeks a/w hypoglycemia (Bs 30s-50s) as well as orthopnea and GUILLEN. CK initially 5173. Renal consulted for TATI with plans for angiogram    Impression: resolving TATI on CKD iso diuresis, CHF, and recent Rhabdo    Plan discussed with attending Dr. Holden     #TATI on CKD  Cr Baseline confirmed w/ pcp Dr. May Antonio, 2.01 on august 4th of this year  Cr was 2.4 in 2018 iso of nephrolithiasis. Allscripts reviewed without more lab information.  Cr here: Min 1.69, max 2.13  Cr now downtrending 1.81<-1.97 <- 2.13 <- 2.06 <- 2.07 <- 2.09 <-1.89<-1.69<-2.01 though numbers below baseline may be iso fluid overload  Urine osmol 633, TP 69, Cr 178, TP/CR 0.4, urea 1112  UA + Blood with 71 RBCs; repeat UA +117 RBCs  Renal US: Nonobstructing left renal calculus. 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 Current lab values c/w stage 3 and similar to baseline Cr CKD.  Risk of Contrast induced rise in creatinine elevated in pt with CKD, DM, age >60, HTN  -Avoid nephrotoxic agents, dose meds per eGFR  -Continue to Trend BMPs  -If getting angiogram and If euvolemic on day of study and okay from cardiac standpoint, would recommend fluids (1cc/kg/hr) 6 hours before and after angiogram. If fluid overloaded, would recommend to continue holding diuresis on day of procedure. Can repeat proBNP or POCUS to assess fluid status    #MicroHematuria  with proteinuria, prior skin cancer s/p radiation, former smoker, 72male  per pt has had issue before and told f/u urology  UA with RBCx2 (71 -->117)  Renal US: Nonobstructing left renal calculus. Bladder wall thickening   Hepatitis panel negative for infection, hepatitis c nonreactive, C3 158, C4 56 both mildly elevated, SIFE ratio wnl, c-anca, p-anca, atypical ANCA all negative,   -Urology eval, can be outpt. pt aware and states will make appointment after scheduled f/u with PCP  -F/U DANDY, dsDNA, , anti-GBM, and urine cytology

## 2022-12-08 NOTE — PROGRESS NOTE ADULT - PROBLEM SELECTOR PLAN 2
New A fib, rate mostly contollred  CHADSVASC score of 4  -c/w Hep gtt as per Cards  -C/w BB for rate control New A fib, rate mostly contollred  CHADSVASC score of 4  -c/w Hep gtt as per Cards  -C/w BB for rate control  - plan to dc on DOAC (with test script sent to pharmacy)

## 2022-12-08 NOTE — PROGRESS NOTE ADULT - PROBLEM SELECTOR PLAN 9
DVT PPx: Heparin gtt  Diet: Consistent carb no snack  Dispo: Medically active DVT PPx: Heparin gtt, hold prior to cath  Diet: Consistent carb no snack  Dispo: Medically active

## 2022-12-08 NOTE — PROGRESS NOTE ADULT - SUBJECTIVE AND OBJECTIVE BOX
Overnight Events: None     Review Of Systems: No chest pain, shortness of breath, or palpitations            Current Meds:  aspirin  chewable 81 milliGRAM(s) Oral daily  atorvastatin 80 milliGRAM(s) Oral at bedtime  dextrose 5%. 1000 milliLiter(s) IV Continuous <Continuous>  dextrose 5%. 1000 milliLiter(s) IV Continuous <Continuous>  dextrose 50% Injectable 25 Gram(s) IV Push once  dextrose 50% Injectable 12.5 Gram(s) IV Push once  dextrose 50% Injectable 25 Gram(s) IV Push once  dextrose Oral Gel 15 Gram(s) Oral once PRN  glucagon  Injectable 1 milliGRAM(s) IntraMuscular once  heparin  Infusion.  Unit(s)/Hr IV Continuous <Continuous>  insulin glargine Injectable (LANTUS) 17 Unit(s) SubCutaneous at bedtime  insulin lispro (ADMELOG) corrective regimen sliding scale   SubCutaneous three times a day before meals  insulin lispro Injectable (ADMELOG) 6 Unit(s) SubCutaneous three times a day before meals  metoprolol succinate  milliGRAM(s) Oral daily      Vitals:  T(F): 98.1 (12-08), Max: 98.1 (12-08)  HR: 71 (12-08) (71 - 105)  BP: 117/76 (12-08) (117/76 - 140/85)  RR: 16 (12-08)  SpO2: 95% (12-08)  I&O's Summary    07 Dec 2022 07:01  -  08 Dec 2022 07:00  --------------------------------------------------------  IN: 1080 mL / OUT: 2310 mL / NET: -1230 mL        Physical Exam:    Appearance: No acute distress; well appearing  Eyes: pink conjunctiva  HEENT: Normal oral mucosa  Cardiovascular: Irregular rhythm, normal rate, normal S1, S2, no murmurs, rubs, or gallops; 1-2+ edema, no JVD   Respiratory: Clear to auscultation bilaterally  Gastrointestinal: soft, non-tender, non-distended   Musculoskeletal: No clubbing; no joint deformity   Neurologic: Normal speech, no facial asymmetry  Psychiatry: AAOx3, mood & affect appropriate  Skin: No rashes, ecchymoses, or cyanosis of exposed skin                             11.3   3.84  )-----------( 198      ( 08 Dec 2022 06:08 )             35.9     12-08    140  |  107  |  24<H>  ----------------------------<  116<H>  4.7   |  23  |  1.81<H>    Ca    9.4      08 Dec 2022 06:08  Phos  4.2     12-08  Mg     2.1     12-08    TPro  6.2  /  Alb  3.6  /  TBili  0.2  /  DBili  x   /  AST  23  /  ALT  34  /  AlkPhos  39<L>  12-08    PT/INR - ( 08 Dec 2022 06:10 )   PT: 13.7 sec;   INR: 1.18 ratio         PTT - ( 08 Dec 2022 06:10 )  PTT:99.8 sec  CARDIAC MARKERS ( 06 Dec 2022 06:12 )  x     / x     / x     / 252 U/L / x     / x      CARDIAC MARKERS ( 05 Dec 2022 04:34 )  x     / x     / x     / 398 U/L / x     / x      CARDIAC MARKERS ( 04 Dec 2022 05:50 )  x     / x     / x     / 781 U/L / x     / x      CARDIAC MARKERS ( 03 Dec 2022 07:07 )  x     / x     / x     / 1277 U/L / x     / x      CARDIAC MARKERS ( 02 Dec 2022 05:42 )  x     / x     / x     / 1921 U/L / x     / x            Interpretation of Telemetry:  AF 90s-100s         Overnight Events: None     No chest pain, shortness of breath, or palpitations            Current Meds:  aspirin  chewable 81 milliGRAM(s) Oral daily  atorvastatin 80 milliGRAM(s) Oral at bedtime  dextrose 5%. 1000 milliLiter(s) IV Continuous <Continuous>  dextrose 5%. 1000 milliLiter(s) IV Continuous <Continuous>  dextrose 50% Injectable 25 Gram(s) IV Push once  dextrose 50% Injectable 12.5 Gram(s) IV Push once  dextrose 50% Injectable 25 Gram(s) IV Push once  dextrose Oral Gel 15 Gram(s) Oral once PRN  glucagon  Injectable 1 milliGRAM(s) IntraMuscular once  heparin  Infusion.  Unit(s)/Hr IV Continuous <Continuous>  insulin glargine Injectable (LANTUS) 17 Unit(s) SubCutaneous at bedtime  insulin lispro (ADMELOG) corrective regimen sliding scale   SubCutaneous three times a day before meals  insulin lispro Injectable (ADMELOG) 6 Unit(s) SubCutaneous three times a day before meals  metoprolol succinate  milliGRAM(s) Oral daily    ROS:  Negative    PMH:  Unchanged     Vitals:  T(F): 98.1 (12-08), Max: 98.1 (12-08)  HR: 71 (12-08) (71 - 105)  BP: 117/76 (12-08) (117/76 - 140/85)  RR: 16 (12-08)  SpO2: 95% (12-08)    Physical Exam:  Appearance: No acute distress; well appearing  Eyes: pink conjunctiva  HEENT: Normal oral mucosa  Cardiovascular: Irregular rhythm, normal rate, normal S1, S2, no murmurs, rubs, or gallops; 1-2+ edema, no JVD   Respiratory: Clear to auscultation bilaterally  Gastrointestinal: soft, non-tender, non-distended   Musculoskeletal: No clubbing; no joint deformity   Neurologic: Normal speech, no facial asymmetry  Psychiatry: AAOx3, mood & affect appropriate  Skin: No rashes, ecchymoses, or cyanosis of exposed skin     I&O's Summary  07 Dec 2022 07:01  -  08 Dec 2022 07:00  --------------------------------------------------------  IN: 1080 mL / OUT: 2310 mL / NET: -1230 mL      LABS:                      11.3   3.84  )-----------( 198      ( 08 Dec 2022 06:08 )             35.9     12-08  140  |  107  |  24<H>  ----------------------------<  116<H>  4.7   |  23  |  1.81<H>    Ca    9.4      08 Dec 2022 06:08  Phos  4.2     12-08  Mg     2.1     12-08    TPro  6.2  /  Alb  3.6  /  TBili  0.2  /  DBili  x   /  AST  23  /  ALT  34  /  AlkPhos  39<L>  12-08    PT/INR - ( 08 Dec 2022 06:10 )   PT: 13.7 sec;   INR: 1.18 ratio    PTT - ( 08 Dec 2022 06:10 )  PTT:99.8 sec    CARDIAC MARKERS ( 06 Dec 2022 06:12 )  x     / x     / x     / 252 U/L / x     / x      CARDIAC MARKERS ( 05 Dec 2022 04:34 )  x     / x     / x     / 398 U/L / x     / x      CARDIAC MARKERS ( 04 Dec 2022 05:50 )  x     / x     / x     / 781 U/L / x     / x      CARDIAC MARKERS ( 03 Dec 2022 07:07 )  x     / x     / x     / 1277 U/L / x     / x      CARDIAC MARKERS ( 02 Dec 2022 05:42 )  x     / x     / x     / 1921 U/L / x     / x          Interpretation of Telemetry:  AF 90s-100s

## 2022-12-08 NOTE — PROGRESS NOTE ADULT - ATTENDING COMMENTS
72M with T2DM on insulin, HTN, HLD here with possible seizures 2/2 hypoglycemia likely from misunderstanding of insulin regimen, complicated by rhabdomyolysis and with worsening exercise tolerance secondary to possible acute heart failure.     # ADHF  Worsening pitting edema w/ worsening exercise tolerance and paroxysmal nocturnal dyspnea suggestive of heart failure, with elevated BMP  - echo with LVSD, EF 39%. already on ACE (held for tati), can transition to entresto when TATI resolves and after cath; creatinine improved to 1.97 today. likely may be around his baseline level. will continue to monitor for now.   - strict I&Os  - daily standing weights  - cardiology consult - plan for cath; held currently due to kidney function; creatinine improved this morning; plan for cath today 11/8   - on IV lasix 20mg daily for diuresis; cardio note appreciated, will hold off on diuresis again today; last dose 12/5   - dyspnea symptomatically improved; denies any further episodes of SOB   - amyloid scan done, strongly suggestive of amyloidosis; genetic test sent as per cardiology recs     # Aflutter  - heparin gtt (CHADsVASC at least 4)  - can be discharged on doac if insurance covers  - cards consult.    # Rhabdo  Mildly elevated CK i/s/o possible seizures 2/2 hypoglycemia  - Cr improved with 500cc in ED + gentle hydration 12/1  - currently downtrended      # TATI on CKD  Elevated Cr i/s/o rhabdo, need to find out baseline (baseline Cr 2 per pcp)  - likely at baseline currently   - Avoid nephrotoxic agents  - Trend Cr daily  - nephro following; sent off workup including C3, C4, DANDY, dsDNA, ANCA, HBSAg, HCV Ab, anti-GBM    # T2DM  On basal/bolus 80BID/45qac per endocrinologist office. However, pt is confused about his exact regimen.  - weight base dose for now given he may not be taking his insulin correctly and he has been hypoglycemic at home  - monitor FS qac qhs   - basal/bolus 17/6 with adequate control  - counseled on carbohydrate control  - consult dietician for education on consistent carb diets at home  - a1c 9.8, lipid panel normal   - better controlled on current insulin regimen     # Cardiac Amyloidosis  - s/p amyloid scan; strongly suggestive of amyloidosis   - cardio f/u; genetic lab sent off to lab as per cardio recs     #Microscopic hematuria   - patient with microscopic hematuria noted on UA  - renal US showing some bladder wall thickening  - patient aware of findings; urology f/u outpatient     Rest of plan as above. Discussed with HS.

## 2022-12-09 LAB
ANION GAP SERPL CALC-SCNC: 12 MMOL/L — SIGNIFICANT CHANGE UP (ref 5–17)
APTT BLD: 42.3 SEC — HIGH (ref 27.5–35.5)
APTT BLD: 63.8 SEC — HIGH (ref 27.5–35.5)
BUN SERPL-MCNC: 24 MG/DL — HIGH (ref 7–23)
CALCIUM SERPL-MCNC: 9.7 MG/DL — SIGNIFICANT CHANGE UP (ref 8.4–10.5)
CHLORIDE SERPL-SCNC: 106 MMOL/L — SIGNIFICANT CHANGE UP (ref 96–108)
CO2 SERPL-SCNC: 22 MMOL/L — SIGNIFICANT CHANGE UP (ref 22–31)
CREAT SERPL-MCNC: 1.96 MG/DL — HIGH (ref 0.5–1.3)
EGFR: 36 ML/MIN/1.73M2 — LOW
GLUCOSE BLDC GLUCOMTR-MCNC: 110 MG/DL — HIGH (ref 70–99)
GLUCOSE BLDC GLUCOMTR-MCNC: 152 MG/DL — HIGH (ref 70–99)
GLUCOSE BLDC GLUCOMTR-MCNC: 167 MG/DL — HIGH (ref 70–99)
GLUCOSE BLDC GLUCOMTR-MCNC: 180 MG/DL — HIGH (ref 70–99)
GLUCOSE SERPL-MCNC: 106 MG/DL — HIGH (ref 70–99)
HCT VFR BLD CALC: 36.5 % — LOW (ref 39–50)
HGB BLD-MCNC: 11.5 G/DL — LOW (ref 13–17)
INR BLD: 1.15 RATIO — SIGNIFICANT CHANGE UP (ref 0.88–1.16)
MAGNESIUM SERPL-MCNC: 2.1 MG/DL — SIGNIFICANT CHANGE UP (ref 1.6–2.6)
MCHC RBC-ENTMCNC: 29.6 PG — SIGNIFICANT CHANGE UP (ref 27–34)
MCHC RBC-ENTMCNC: 31.5 GM/DL — LOW (ref 32–36)
MCV RBC AUTO: 94.1 FL — SIGNIFICANT CHANGE UP (ref 80–100)
NRBC # BLD: 0 /100 WBCS — SIGNIFICANT CHANGE UP (ref 0–0)
NT-PROBNP SERPL-SCNC: 2591 PG/ML — HIGH (ref 0–300)
PHOSPHATE SERPL-MCNC: 4.1 MG/DL — SIGNIFICANT CHANGE UP (ref 2.5–4.5)
PLATELET # BLD AUTO: 171 K/UL — SIGNIFICANT CHANGE UP (ref 150–400)
POTASSIUM SERPL-MCNC: 5.2 MMOL/L — SIGNIFICANT CHANGE UP (ref 3.5–5.3)
POTASSIUM SERPL-SCNC: 5.2 MMOL/L — SIGNIFICANT CHANGE UP (ref 3.5–5.3)
PROTHROM AB SERPL-ACNC: 13.2 SEC — SIGNIFICANT CHANGE UP (ref 10.5–13.4)
RBC # BLD: 3.88 M/UL — LOW (ref 4.2–5.8)
RBC # FLD: 13.9 % — SIGNIFICANT CHANGE UP (ref 10.3–14.5)
SODIUM SERPL-SCNC: 140 MMOL/L — SIGNIFICANT CHANGE UP (ref 135–145)
WBC # BLD: 4.43 K/UL — SIGNIFICANT CHANGE UP (ref 3.8–10.5)
WBC # FLD AUTO: 4.43 K/UL — SIGNIFICANT CHANGE UP (ref 3.8–10.5)

## 2022-12-09 PROCEDURE — 99232 SBSQ HOSP IP/OBS MODERATE 35: CPT | Mod: GC

## 2022-12-09 RX ORDER — INSULIN DETEMIR 100/ML (3)
17 INSULIN PEN (ML) SUBCUTANEOUS EVERY 24 HOURS
Refills: 0 | Status: DISCONTINUED | OUTPATIENT
Start: 2022-12-10 | End: 2022-12-10

## 2022-12-09 RX ORDER — APIXABAN 2.5 MG/1
1 TABLET, FILM COATED ORAL
Qty: 60 | Refills: 0
Start: 2022-12-09 | End: 2023-01-07

## 2022-12-09 RX ORDER — APIXABAN 2.5 MG/1
1 TABLET, FILM COATED ORAL
Qty: 14 | Refills: 0
Start: 2022-12-09 | End: 2022-12-15

## 2022-12-09 RX ORDER — APIXABAN 2.5 MG/1
5 TABLET, FILM COATED ORAL
Refills: 0 | Status: DISCONTINUED | OUTPATIENT
Start: 2022-12-09 | End: 2022-12-10

## 2022-12-09 RX ADMIN — Medication 100 MILLIGRAM(S): at 05:09

## 2022-12-09 RX ADMIN — Medication 6 UNIT(S): at 08:24

## 2022-12-09 RX ADMIN — Medication 1: at 08:23

## 2022-12-09 RX ADMIN — ATORVASTATIN CALCIUM 80 MILLIGRAM(S): 80 TABLET, FILM COATED ORAL at 21:51

## 2022-12-09 RX ADMIN — Medication 6 UNIT(S): at 17:01

## 2022-12-09 RX ADMIN — HEPARIN SODIUM 2000 UNIT(S)/HR: 5000 INJECTION INTRAVENOUS; SUBCUTANEOUS at 08:24

## 2022-12-09 RX ADMIN — Medication 1: at 11:50

## 2022-12-09 RX ADMIN — APIXABAN 5 MILLIGRAM(S): 2.5 TABLET, FILM COATED ORAL at 21:51

## 2022-12-09 RX ADMIN — Medication 81 MILLIGRAM(S): at 11:51

## 2022-12-09 RX ADMIN — Medication 6 UNIT(S): at 11:50

## 2022-12-09 NOTE — PROGRESS NOTE ADULT - ATTENDING COMMENTS
2M PMHx IDDM (novolog and levemir), HTN, HLD p/w night time seizure like activity with episodes x2-3/last 2 weeks a/w hypoglycemia (Bs 30s-50s) as well as orthopnea and GUILLEN. CK initially 5173. Renal consulted for TATI with plans for angiogram.  No complaints today post angiogram  1.  ARF on CKD3--DM chronci +/- cardiac amyloidosis.  Volume optimization with strong suspicion of acute cardiorenal component.  Contrast protection as outlined based on optimization of volume status (can check BNP, lung POCUS), and minimal dye.    2,  Proteinuria--w/u largely unrevealing.  DM, amyloid leading contenders.   3.  HYpoglycemia--likely related to LONG ACTING insulin (glargine may not be best choice vs detemir, NPH) in setting of 1.  Review with endocrine agent, dosing glargine maintained may be safer to give am rather than late pm.  Resolved today.  Trend    discussed with med team  Ken Holden MD  contact me on TEAMS

## 2022-12-09 NOTE — PROGRESS NOTE ADULT - SUBJECTIVE AND OBJECTIVE BOX
INTERVAL HPI/OVERNIGHT EVENTS:    SUBJECTIVE: Patient seen and examined at bedside.         OBJECTIVE:    VITAL SIGNS:  ICU Vital Signs Last 24 Hrs  T(C): 36.7 (09 Dec 2022 04:24), Max: 37 (08 Dec 2022 19:54)  T(F): 98 (09 Dec 2022 04:24), Max: 98.6 (08 Dec 2022 19:54)  HR: 108 (09 Dec 2022 04:24) (106 - 126)  BP: 141/91 (09 Dec 2022 04:24) (117/88 - 144/92)  BP(mean): --  ABP: --  ABP(mean): --  RR: 16 (09 Dec 2022 04:24) (16 - 18)  SpO2: 96% (09 Dec 2022 04:24) (93% - 98%)    O2 Parameters below as of 09 Dec 2022 04:24  Patient On (Oxygen Delivery Method): room air            Plateau pressure:   P/F ratio:     12-08 @ 07:01  -  12-09 @ 07:00  --------------------------------------------------------  IN: 1240 mL / OUT: 680 mL / NET: 560 mL      CAPILLARY BLOOD GLUCOSE      POCT Blood Glucose.: 256 mg/dL (08 Dec 2022 21:46)    ECG:    PHYSICAL EXAM:    General: NAD  HEENT: clear conjunctiva  Neck: supple  Respiratory: CTA b/l  Cardiovascular: +S1/S2; RRR  Abdomen: soft, NT/ND; +BS x4  Extremities: 2+ peripheral pulses b/l; no LE edema  Skin: normal color and turgor; no rash  Neurological:    MEDICATIONS:  MEDICATIONS  (STANDING):  aspirin  chewable 81 milliGRAM(s) Oral daily  atorvastatin 80 milliGRAM(s) Oral at bedtime  dextrose 5%. 1000 milliLiter(s) (100 mL/Hr) IV Continuous <Continuous>  dextrose 5%. 1000 milliLiter(s) (50 mL/Hr) IV Continuous <Continuous>  dextrose 50% Injectable 25 Gram(s) IV Push once  dextrose 50% Injectable 12.5 Gram(s) IV Push once  dextrose 50% Injectable 25 Gram(s) IV Push once  glucagon  Injectable 1 milliGRAM(s) IntraMuscular once  heparin  Infusion.  Unit(s)/Hr (20 mL/Hr) IV Continuous <Continuous>  insulin glargine Injectable (LANTUS) 17 Unit(s) SubCutaneous at bedtime  insulin lispro (ADMELOG) corrective regimen sliding scale   SubCutaneous at bedtime  insulin lispro (ADMELOG) corrective regimen sliding scale   SubCutaneous three times a day before meals  insulin lispro Injectable (ADMELOG) 6 Unit(s) SubCutaneous three times a day before meals  metoprolol succinate  milliGRAM(s) Oral daily    MEDICATIONS  (PRN):  dextrose Oral Gel 15 Gram(s) Oral once PRN Blood Glucose LESS THAN 70 milliGRAM(s)/deciliter  heparin   Injectable 9000 Unit(s) IV Push every 6 hours PRN For aPTT less than 40  heparin   Injectable 4500 Unit(s) IV Push every 6 hours PRN For aPTT between 40 - 57      LABS:                        11.5   4.43  )-----------( 171      ( 09 Dec 2022 05:44 )             36.5     12-09    140  |  106  |  24<H>  ----------------------------<  106<H>  5.2   |  22  |  1.96<H>    Ca    9.7      09 Dec 2022 05:44  Phos  4.1     12-09  Mg     2.1     12-09    TPro  6.2  /  Alb  3.6  /  TBili  0.2  /  DBili  x   /  AST  23  /  ALT  34  /  AlkPhos  39<L>  12-08    PT/INR - ( 09 Dec 2022 05:44 )   PT: 13.2 sec;   INR: 1.15 ratio         PTT - ( 09 Dec 2022 05:44 )  PTT:63.8 sec      RADIOLOGY & ADDITIONAL TESTS: Reviewed.     INTERVAL HPI/OVERNIGHT EVENTS:    SUBJECTIVE: Patient seen and examined at bedside.     OBJECTIVE:    VITAL SIGNS:  ICU Vital Signs Last 24 Hrs  T(C): 36.7 (09 Dec 2022 04:24), Max: 37 (08 Dec 2022 19:54)  T(F): 98 (09 Dec 2022 04:24), Max: 98.6 (08 Dec 2022 19:54)  HR: 108 (09 Dec 2022 04:24) (106 - 126)  BP: 141/91 (09 Dec 2022 04:24) (117/88 - 144/92)  BP(mean): --  ABP: --  ABP(mean): --  RR: 16 (09 Dec 2022 04:24) (16 - 18)  SpO2: 96% (09 Dec 2022 04:24) (93% - 98%)    O2 Parameters below as of 09 Dec 2022 04:24  Patient On (Oxygen Delivery Method): room air            Plateau pressure:   P/F ratio:     12-08 @ 07:01  -  12-09 @ 07:00  --------------------------------------------------------  IN: 1240 mL / OUT: 680 mL / NET: 560 mL      CAPILLARY BLOOD GLUCOSE      POCT Blood Glucose.: 256 mg/dL (08 Dec 2022 21:46)    ECG:    PHYSICAL EXAM:    General: NAD  HEENT: clear conjunctiva  Neck: supple  Respiratory: CTA b/l  Cardiovascular: +S1/S2; RRR  Abdomen: soft, NT/ND; +BS x4  Extremities: 2+ peripheral pulses b/l; 2+ lower extremity edema  Skin: normal color and turgor; no rash  Neurological: AOX3    MEDICATIONS:  MEDICATIONS  (STANDING):  aspirin  chewable 81 milliGRAM(s) Oral daily  atorvastatin 80 milliGRAM(s) Oral at bedtime  dextrose 5%. 1000 milliLiter(s) (100 mL/Hr) IV Continuous <Continuous>  dextrose 5%. 1000 milliLiter(s) (50 mL/Hr) IV Continuous <Continuous>  dextrose 50% Injectable 25 Gram(s) IV Push once  dextrose 50% Injectable 12.5 Gram(s) IV Push once  dextrose 50% Injectable 25 Gram(s) IV Push once  glucagon  Injectable 1 milliGRAM(s) IntraMuscular once  heparin  Infusion.  Unit(s)/Hr (20 mL/Hr) IV Continuous <Continuous>  insulin glargine Injectable (LANTUS) 17 Unit(s) SubCutaneous at bedtime  insulin lispro (ADMELOG) corrective regimen sliding scale   SubCutaneous at bedtime  insulin lispro (ADMELOG) corrective regimen sliding scale   SubCutaneous three times a day before meals  insulin lispro Injectable (ADMELOG) 6 Unit(s) SubCutaneous three times a day before meals  metoprolol succinate  milliGRAM(s) Oral daily    MEDICATIONS  (PRN):  dextrose Oral Gel 15 Gram(s) Oral once PRN Blood Glucose LESS THAN 70 milliGRAM(s)/deciliter  heparin   Injectable 9000 Unit(s) IV Push every 6 hours PRN For aPTT less than 40  heparin   Injectable 4500 Unit(s) IV Push every 6 hours PRN For aPTT between 40 - 57      LABS:                        11.5   4.43  )-----------( 171      ( 09 Dec 2022 05:44 )             36.5     12-09    140  |  106  |  24<H>  ----------------------------<  106<H>  5.2   |  22  |  1.96<H>    Ca    9.7      09 Dec 2022 05:44  Phos  4.1     12-09  Mg     2.1     12-09    TPro  6.2  /  Alb  3.6  /  TBili  0.2  /  DBili  x   /  AST  23  /  ALT  34  /  AlkPhos  39<L>  12-08    PT/INR - ( 09 Dec 2022 05:44 )   PT: 13.2 sec;   INR: 1.15 ratio         PTT - ( 09 Dec 2022 05:44 )  PTT:63.8 sec      RADIOLOGY & ADDITIONAL TESTS: Reviewed.

## 2022-12-09 NOTE — PROGRESS NOTE ADULT - ASSESSMENT
72M PMHx IDDM (novolog and levemir), HTN, HLD p/w night time seizure like activity with episodes x2-3/last 2 weeks a/w hypoglycemia (Bs 30s-50s) as well as orthopnea and GUILLEN. CK initially 5173. Renal consulted for TATI with plans for angiogram    Impression: resolving TATI on CKD iso diuresis, CHF, and recent Rhabdo    Plan not yet discussed with attending Nephrologist Dr. Holden     #TATI on CKD  Cr Baseline confirmed w/ pcp Dr. May Antonio, 2.01 on august 4th of this year  Cr was 2.4 in 2018 iso of nephrolithiasis. Allscripts reviewed without more lab information.  Cr here: Min 1.69, max 2.13  Cr now downtrending 1.96<-Contrast/Cath--1.81<-1.97 <- 2.13 <- 2.06 <- 2.07 <- 2.09 <-1.89<-1.69<-2.01 though earlier numbers below baseline may be iso fluid overload  Urine osmol 633, TP 69, Cr 178, TP/CR 0.4, urea 1112  UA + Blood with 71 RBCs; repeat UA +117 RBCs  Renal US: Nonobstructing left renal calculus. 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 Current lab values c/w stage 3 and similar to baseline Cr CKD.  Risk of Contrast induced rise in creatinine elevated in pt with CKD, DM, age >60, HTN  -Avoid nephrotoxic agents, dose meds per eGFR  -Continue to Trend BMPs, contrast associated azotemia can be delayed    #MicroHematuria  with proteinuria, prior mycosis fungoides s/p full body radiation, former smoker, occupational exposures x many years, 72male  per pt has had issue before and told f/u urology  UA with RBCx2 (71 -->117)  Renal US: Nonobstructing left renal calculus. Bladder wall thickening   Hepatitis panel negative for infection, hepatitis c nonreactive, C3 158, C4 56 both mildly elevated, SIFE ratio wnl, c-anca, p-anca, atypical ANCA all negative, dsDNA neg, DANDY neg, anti GBM neg  -Urology eval, can be outpt. pt aware and states will make appointment after scheduled f/u with PCP  -Send urine cytology  72M PMHx IDDM (novolog and levemir), HTN, HLD p/w night time seizure like activity with episodes x2-3/last 2 weeks a/w hypoglycemia (Bs 30s-50s) as well as orthopnea and GUILLEN. CK initially 5173. Renal consulted for TATI with plans for angiogram    Impression: resolving TATI on CKD iso diuresis, CHF, and recent Rhabdo    Plan discussed with attending Nephrologist Dr. Holden     #TATI on CKD  Cr Baseline confirmed w/ pcp Dr. May Antonio, 2.01 on august 4th of this year  Cr was 2.4 in 2018 iso of nephrolithiasis. Allscripts reviewed without more lab information.  Cr here: Min 1.69, max 2.13  Cr now downtrending 1.96<-Contrast/Cath--1.81<-1.97 <- 2.13 <- 2.06 <- 2.07 <- 2.09 <-1.89<-1.69<-2.01 though earlier numbers below baseline may be iso fluid overload  Urine osmol 633, TP 69, Cr 178, TP/CR 0.4, urea 1112  UA + Blood with 71 RBCs; repeat UA +117 RBCs  Renal US: Nonobstructing left renal calculus. 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 Current lab values c/w stage 3 and similar to baseline Cr CKD.  Risk of Contrast induced rise in creatinine elevated in pt with CKD, DM, age >60, HTN  -Avoid nephrotoxic agents, dose meds per eGFR  -Continue to Trend BMPs, contrast associated azotemia can be delayed.  -Check cystatin C    #MicroHematuria  with proteinuria, prior mycosis fungoides s/p full body radiation, former smoker, occupational exposures x many years, 72male  per pt has had issue before and told f/u urology  UA with RBCx2 (71 -->117)  Renal US: Nonobstructing left renal calculus. Bladder wall thickening   Hepatitis panel negative for infection, hepatitis c nonreactive, C3 158, C4 56 both mildly elevated, SIFE ratio wnl, c-anca, p-anca, atypical ANCA all negative, dsDNA neg, DANDY neg, anti GBM neg  -Urology eval, can be outpt. pt aware and states will make appointment after scheduled f/u with PCP  -Send urine cytology

## 2022-12-09 NOTE — PROGRESS NOTE ADULT - PROBLEM SELECTOR PLAN 3
Elevated CK and + Blood in urine c/f rhabdomyolysis i/s/o hypoglycemic seizures  -Cr 2.02 on presentation; unclear baseline as PCP said he has CKD3, and had Cr>2 on lab imaging in past  -CK significantly downtrending  - repeat UA+Microscopy with significant RBCs 117  - Nephro: TATI on CKD likely 2/2 recent rhabdomyolysis (resolving), CHF, or diuretics   - Nephro recs- give fluids 6 hrs before, 6 hrs after @ 1 cc/kg/hr if planned for angio and euvolemic, and hold diuretics on day of angiogram  - Kidney/Bladder U/S with nonobstructing L renal calculus and bladder wall thickening  [ ] f/u C3 mildly elevated, C4 mildly elevated, dsDNA, anti-GBM, DANDY, ANCA, hepatitis panel- negative  - urology f/u OP for cystoscopy in setting of microhematuria, bladder wall thickening   - Holding diuretics on day of cath  - Cr downtrending Elevated CK and + Blood in urine c/f rhabdomyolysis i/s/o hypoglycemic seizures  -Cr 2.02 on presentation; unclear baseline as PCP said he has CKD3, and had Cr>2 on lab imaging in past  -CK significantly downtrending  - repeat UA+Microscopy with significant RBCs 117  - Nephro: TATI on CKD likely 2/2 recent rhabdomyolysis (resolving), CHF, or diuretics   - Nephro recs- give fluids 6 hrs before, 6 hrs after @ 1 cc/kg/hr if planned for angio and euvolemic, and hold diuretics on day of angiogram  - Kidney/Bladder U/S with nonobstructing L renal calculus and bladder wall thickening  [ ] f/u C3 mildly elevated, C4 mildly elevated, dsDNA, anti-GBM, DANDY, ANCA, hepatitis panel- negative  - urology f/u OP for cystoscopy in setting of microhematuria, bladder wall thickening   - Holding diuretics on day of cath  - Cr downtrending  NEW  CTM cr for contrast induced nephropathy

## 2022-12-09 NOTE — PROGRESS NOTE ADULT - PROBLEM SELECTOR PLAN 2
New A fib, rate mostly contollred  CHADSVASC score of 4  -c/w Hep gtt as per Cards  -C/w BB for rate control  - plan to dc on DOAC (with test script sent to pharmacy) New A fib, rate mostly contollred  CHADSVASC score of 4  -c/w Hep gtt as per Cards  -C/w BB for rate control  NEW  Hep gtt dc'd  Eliquis script sent to pharmacy (covered by ins)

## 2022-12-09 NOTE — PROGRESS NOTE ADULT - PROBLEM SELECTOR PLAN 1
Orthopnea, elevated proBNP, pulmonary edema suggestive of ADHF  -TTE showed HFrEF (EF 39%)  -on room air  - Card recd IV Lasix 20 dosed daily (held today)  -C/w Toprol  -holding ACE-I i/s/o TATI --> consider switching to ARB/ARNI this admission once renal function improves   - add on SGLT2i for GDMT  -cardiology consulted for evaluation of new onset HF  -Angiogram Monday 12/5 deferred by Cards until further nephro input  - Tc-99m-PYP scan for amyloidosis- strongly suggestive  - Kappa/Thom ratio wnl   [ ] f/u Cardiac Cath 12/8   [ ] f/u genetic testing for amyloidosis Orthopnea, elevated proBNP, pulmonary edema suggestive of ADHF  -TTE showed HFrEF (EF 39%)  -on room air  - Card recd IV Lasix 20 dosed daily (held today)  -C/w Toprol  -holding ACE-I i/s/o TATI --> consider switching to ARB/ARNI this admission once renal function improves   - add on SGLT2i for GDMT  -cardiology consulted for evaluation of new onset HF  -Angiogram Monday 12/5 deferred by Cards until further nephro input  - Tc-99m-PYP scan for amyloidosis- strongly suggestive  - Kappa/Thom ratio wnl   NEW  [ ] f/u Cardiac Cath 12/8- non-obstructive mild-moderate CAD   [ ] f/u genetic testing for amyloidosis  - Aspirin, statin, BB

## 2022-12-09 NOTE — PROGRESS NOTE ADULT - ATTENDING COMMENTS
72M with T2DM on insulin, HTN, HLD here with possible seizures 2/2 hypoglycemia likely from misunderstanding of insulin regimen, complicated by rhabdomyolysis and with worsening exercise tolerance secondary to possible acute heart failure.     # ADHF  Worsening pitting edema w/ worsening exercise tolerance and paroxysmal nocturnal dyspnea suggestive of heart failure, with elevated BMP  - echo with LVSD, EF 39%. already on ACE (held for tati). cards recs appreciated; likely due to amyloidosis, would not benefit from GDMT. Can hold off on ACEi/ARB. Continue ASA, statin, BB    - strict I&Os  - daily standing weights  - cardiology consult - s/p cath 12/8, nonobstructive mild to moderate CAD   - on IV lasix 20mg daily for diuresis; was held for planned cath; last dose 12/5. will plan to start PO lasix   - dyspnea symptomatically improved; denies any further episodes of SOB   - amyloid scan done, strongly suggestive of amyloidosis; genetic test sent as per cardiology recs; can follow up with cardio, Dr. Ernst Hernadez or Dr. Brenden Brandon as outpatient for further management     # Aflutter  - heparin gtt (CHADsVASC at least 4)  - plan to transition to eliquis tonight, will send to pharmacy to see if covered by insurance   - cards consult appreciated     # Rhabdo  Mildly elevated CK i/s/o possible seizures 2/2 hypoglycemia  - Cr improved with 500cc in ED + gentle hydration 12/1  - currently downtrended      # TATI on CKD  Elevated Cr i/s/o rhabdo, (baseline Cr 2 per pcp)  - likely at baseline currently   - Avoid nephrotoxic agents  - Trend Cr daily  - nephro following; sent off workup including C3, C4, DANDY, dsDNA, ANCA, HBSAg, HCV Ab, anti-GBM; all negative so far     # T2DM  On basal/bolus 80BID/45qac per endocrinologist office. However, pt is confused about his exact regimen.  - weight base dose for now given he may not be taking his insulin correctly and he has been hypoglycemic at home  - monitor FS qac qhs   - basal/bolus 17/6 with adequate control  - counseled on carbohydrate control  - consult dietician for education on consistent carb diets at home  - a1c 9.8, lipid panel normal   - better controlled on current insulin regimen; likely will plan to DC patient home on current regimen being given in hospital     # Cardiac Amyloidosis  - s/p amyloid scan; strongly suggestive of amyloidosis   - cardio f/u; genetic lab sent off to lab as per cardio recs; can follow up with cardio, Dr. Ernst Hernadez or Dr. Brenden Brandon as outpatient for further management     #Microscopic hematuria   - patient with microscopic hematuria noted on UA  - renal US showing some bladder wall thickening  - patient aware of findings; urology f/u outpatient    Rest of plan as above. Discussed with HS.    Likely DC home in AM if remains stable and Eliquis covered by insurance. 72M with T2DM on insulin, HTN, HLD here with possible seizures 2/2 hypoglycemia likely from misunderstanding of insulin regimen, complicated by rhabdomyolysis and with worsening exercise tolerance secondary to possible acute heart failure. .    # ADHF  Worsening pitting edema w/ worsening exercise tolerance and paroxysmal nocturnal dyspnea suggestive of heart failure, with elevated BMP  - echo with LVSD, EF 39%. already on ACE (held for tati). cards recs appreciated; likely due to amyloidosis, would not benefit from GDMT. Can hold off on ACEi/ARB. Continue ASA, statin, BB    - strict I&Os  - daily standing weights  - cardiology consult - s/p cath 12/8, nonobstructive mild to moderate CAD   - on IV lasix 20mg daily for diuresis; was held for planned cath; last dose 12/5. will plan to start PO lasix   - dyspnea symptomatically improved; denies any further episodes of SOB   - amyloid scan done, strongly suggestive of amyloidosis; genetic test sent as per cardiology recs; can follow up with cardio, Dr. Ernst Hernadez or Dr. Brenden Brandon as outpatient for further management     # Aflutter  - heparin gtt (CHADsVASC at least 4)  - plan to transition to eliquis tonight, will send to pharmacy to see if covered by insurance   - cards consult appreciated     # Rhabdo  Mildly elevated CK i/s/o possible seizures 2/2 hypoglycemia  - Cr improved with 500cc in ED + gentle hydration 12/1  - currently downtrended      # TATI on CKD  Elevated Cr i/s/o rhabdo, (baseline Cr 2 per pcp)  - likely at baseline currently   - Avoid nephrotoxic agents  - Trend Cr daily  - nephro following; sent off workup including C3, C4, DANDY, dsDNA, ANCA, HBSAg, HCV Ab, anti-GBM; all negative so far     # T2DM  On basal/bolus 80BID/45qac per endocrinologist office. However, pt is confused about his exact regimen.  - weight base dose for now given he may not be taking his insulin correctly and he has been hypoglycemic at home  - monitor FS qac qhs   - basal/bolus 17/6 with adequate control  - counseled on carbohydrate control  - consult dietician for education on consistent carb diets at home  - a1c 9.8, lipid panel normal   - better controlled on current insulin regimen; likely will plan to DC patient home on current regimen being given in hospital     # Cardiac Amyloidosis  - s/p amyloid scan; strongly suggestive of amyloidosis   - cardio f/u; genetic lab sent off to lab as per cardio recs; can follow up with cardio, Dr. Ernst Hernadez or Dr. Brenden Brandon as outpatient for further management     #Microscopic hematuria   - patient with microscopic hematuria noted on UA  - renal US showing some bladder wall thickening  - patient aware of findings; urology f/u outpatient    Rest of plan as above. Discussed with HS.    Likely DC home in AM if remains stable and Eliquis covered by insurance.

## 2022-12-09 NOTE — PROGRESS NOTE ADULT - SUBJECTIVE AND OBJECTIVE BOX
[facility] DIVISION OF KIDNEY DISEASES AND HYPERTENSION   FOLLOW UP NOTE    --------------------------------------------------------------------------------  Chief Complaint:    24 hour events/subjective: Pt. was seen and examined today. Cath yesterday, NICM. Awaiting full report for amount of contrast given. Denies symptoms. Denies orthopnea, PND, GUILLEN/SOB, Dysuria, gross hematuria      PAST HISTORY  --------------------------------------------------------------------------------  No significant changes to PMH, PSH, FHx, SHx, unless otherwise noted    ALLERGIES & MEDICATIONS  --------------------------------------------------------------------------------  Allergies    No Known Allergies    Intolerances      Standing Inpatient Medications  aspirin  chewable 81 milliGRAM(s) Oral daily  atorvastatin 80 milliGRAM(s) Oral at bedtime  dextrose 5%. 1000 milliLiter(s) IV Continuous <Continuous>  dextrose 5%. 1000 milliLiter(s) IV Continuous <Continuous>  dextrose 50% Injectable 25 Gram(s) IV Push once  dextrose 50% Injectable 12.5 Gram(s) IV Push once  dextrose 50% Injectable 25 Gram(s) IV Push once  glucagon  Injectable 1 milliGRAM(s) IntraMuscular once  heparin  Infusion.  Unit(s)/Hr IV Continuous <Continuous>  insulin glargine Injectable (LANTUS) 17 Unit(s) SubCutaneous at bedtime  insulin lispro (ADMELOG) corrective regimen sliding scale   SubCutaneous at bedtime  insulin lispro (ADMELOG) corrective regimen sliding scale   SubCutaneous three times a day before meals  insulin lispro Injectable (ADMELOG) 6 Unit(s) SubCutaneous three times a day before meals  metoprolol succinate  milliGRAM(s) Oral daily    PRN Inpatient Medications  dextrose Oral Gel 15 Gram(s) Oral once PRN  heparin   Injectable 9000 Unit(s) IV Push every 6 hours PRN  heparin   Injectable 4500 Unit(s) IV Push every 6 hours PRN      REVIEW OF SYSTEMS  --------------------------------------------------------------------------------  Gen: No fevers/chills  Head/Eyes/Ears: No HA   Respiratory: No dyspnea, cough  CV: No chest pain  GI: No abdominal pain, diarrhea  : No dysuria, hematuria  MSK:edema unchanged  Skin: No rashes  Heme: No easy bruising or bleeding    All other systems were reviewed and are negative, except as noted.    VITALS/PHYSICAL EXAM  --------------------------------------------------------------------------------  T(C): 36.7 (12-09-22 @ 08:05), Max: 37 (12-08-22 @ 19:54)  HR: 70 (12-09-22 @ 08:05) (70 - 126)  BP: 115/70 (12-09-22 @ 08:05) (115/70 - 144/92)  RR: 18 (12-09-22 @ 08:05) (16 - 18)  SpO2: 98% (12-09-22 @ 08:05) (93% - 98%)  Wt(kg): --  Height (cm): 183 (12-08-22 @ 13:34)  Weight (kg): 111 (12-08-22 @ 13:34)  BMI (kg/m2): 33.1 (12-08-22 @ 13:34)  BSA (m2): 2.32 (12-08-22 @ 13:34)      12-08-22 @ 07:01  -  12-09-22 @ 07:00  --------------------------------------------------------  IN: 1240 mL / OUT: 680 mL / NET: 560 mL    12-09-22 @ 07:01  -  12-09-22 @ 11:16  --------------------------------------------------------  IN: 240 mL / OUT: 0 mL / NET: 240 mL        Physical Exam:  	Gen: NAD  	HEENT: Anicteric  	Pulm: CTA B/L  	CV: S1S2+  	Ext: LE edema B/L 2+ to knees  	Neuro: Awake alert oriented x 3  	Skin: Warm and dry        LABS/STUDIES  --------------------------------------------------------------------------------              11.5   4.43  >-----------<  171      [12-09-22 @ 05:44]              36.5     140  |  106  |  24  ----------------------------<  106      [12-09-22 @ 05:44]  5.2   |  22  |  1.96        Ca     9.7     [12-09-22 @ 05:44]      Mg     2.1     [12-09-22 @ 05:44]      Phos  4.1     [12-09-22 @ 05:44]    TPro  6.2  /  Alb  3.6  /  TBili  0.2  /  DBili  x   /  AST  23  /  ALT  34  /  AlkPhos  39  [12-08-22 @ 06:08]    PT/INR: PT 13.2 , INR 1.15       [12-09-22 @ 05:44]  PTT: 63.8       [12-09-22 @ 05:44]      Creatinine Trend:  SCr 1.96 [12-09 @ 05:44]  SCr 1.81 [12-08 @ 06:08]  SCr 1.97 [12-07 @ 05:45]  SCr 2.13 [12-06 @ 06:12]  SCr 2.06 [12-05 @ 04:34]    Urinalysis - [12-05-22 @ 16:18]      Color Yellow / Appearance Clear / SG 1.021 / pH 6.0      Gluc Negative / Ketone Negative  / Bili Negative / Urobili Negative       Blood Large / Protein 100 / Leuk Est Negative / Nitrite Negative       / WBC 1 / Hyaline 1 / Gran  / Sq Epi  / Non Sq Epi 0 / Bacteria Negative    Urine Creatinine 178      [12-05-22 @ 16:33]  Urine Protein 69      [12-05-22 @ 16:33]  Urine Urea Nitrogen 1112      [12-05-22 @ 16:32]  Urine Osmolality 633      [12-05-22 @ 16:33]    HBsAg Nonreact      [12-07-22 @ 09:44]  HCV 0.06, Nonreact      [12-07-22 @ 09:44]    DANDY: titer Negative, pattern --      [12-07-22 @ 09:44]  dsDNA <12      [12-07-22 @ 09:44]  C3 Complement 158      [12-07-22 @ 09:44]  C4 Complement 58      [12-07-22 @ 09:44]  ANCA: cANCA Negative, pANCA Negative, atypical ANCA Negative      [12-07-22 @ 09:44]  anti-GBM <0.2      [12-07-22 @ 09:44]  Free Light Chains: kappa 4.11, lambda 2.75, ratio = 1.49      [12-04 @ 05:51]

## 2022-12-09 NOTE — PROGRESS NOTE ADULT - PROBLEM SELECTOR PLAN 4
Per endocrinologist, patient on 80 BID levemir and 45 TID novolog with meals  Unclear compliance per patient history; concern for over medication resulting in hypoglycemic episodes  -started with weight base dosing for insulin (0.3 mg/kg/day) (17 units at bedtime, 6 units premeal)  -low dose ISS (only required 1 additional unit yesterday)  -adjust Insulin as needed based on sliding scale requirements  - Insulin education on discharge Per endocrinologist, patient on 80 BID levemir and 45 TID novolog with meals  Unclear compliance per patient history; concern for over medication resulting in hypoglycemic episodes  -started with weight base dosing for insulin (0.3 mg/kg/day) (17 units at bedtime, 6 units premeal)  -low dose ISS (only required 1 additional unit yesterday)  -adjust Insulin as needed based on sliding scale requirements  - Insulin education on discharge  NEW  - changed glargine to detemir per nephro note

## 2022-12-09 NOTE — PROGRESS NOTE ADULT - PROBLEM SELECTOR PLAN 9
DVT PPx: Heparin gtt, hold prior to cath  Diet: Consistent carb no snack  Dispo: Medically active DVT PPx: eliquis   Diet: Consistent carb no snack  Dispo: Medically active

## 2022-12-10 ENCOUNTER — TRANSCRIPTION ENCOUNTER (OUTPATIENT)
Age: 72
End: 2022-12-10

## 2022-12-10 VITALS
TEMPERATURE: 98 F | RESPIRATION RATE: 16 BRPM | OXYGEN SATURATION: 94 % | HEART RATE: 124 BPM | DIASTOLIC BLOOD PRESSURE: 79 MMHG | SYSTOLIC BLOOD PRESSURE: 117 MMHG

## 2022-12-10 LAB
ANION GAP SERPL CALC-SCNC: 11 MMOL/L — SIGNIFICANT CHANGE UP (ref 5–17)
APTT BLD: 29.8 SEC — SIGNIFICANT CHANGE UP (ref 27.5–35.5)
BASOPHILS # BLD AUTO: 0.02 K/UL — SIGNIFICANT CHANGE UP (ref 0–0.2)
BASOPHILS NFR BLD AUTO: 0.5 % — SIGNIFICANT CHANGE UP (ref 0–2)
BUN SERPL-MCNC: 24 MG/DL — HIGH (ref 7–23)
CALCIUM SERPL-MCNC: 9.5 MG/DL — SIGNIFICANT CHANGE UP (ref 8.4–10.5)
CHLORIDE SERPL-SCNC: 105 MMOL/L — SIGNIFICANT CHANGE UP (ref 96–108)
CO2 SERPL-SCNC: 24 MMOL/L — SIGNIFICANT CHANGE UP (ref 22–31)
CREAT SERPL-MCNC: 1.83 MG/DL — HIGH (ref 0.5–1.3)
CYSTATIN C SERPL-MCNC: 1.36 MG/L — SIGNIFICANT CHANGE UP (ref 0.82–1.52)
EGFR: 39 ML/MIN/1.73M2 — LOW
EOSINOPHIL # BLD AUTO: 0.12 K/UL — SIGNIFICANT CHANGE UP (ref 0–0.5)
EOSINOPHIL NFR BLD AUTO: 2.8 % — SIGNIFICANT CHANGE UP (ref 0–6)
GFR/BSA.PRED SERPLBLD CYS-BASED-ARV: 50 ML/MIN/1.73M2 — LOW
GLUCOSE BLDC GLUCOMTR-MCNC: 143 MG/DL — HIGH (ref 70–99)
GLUCOSE BLDC GLUCOMTR-MCNC: 260 MG/DL — HIGH (ref 70–99)
GLUCOSE BLDC GLUCOMTR-MCNC: 318 MG/DL — HIGH (ref 70–99)
GLUCOSE SERPL-MCNC: 149 MG/DL — HIGH (ref 70–99)
HCT VFR BLD CALC: 37 % — LOW (ref 39–50)
HGB BLD-MCNC: 11.6 G/DL — LOW (ref 13–17)
IMM GRANULOCYTES NFR BLD AUTO: 0.2 % — SIGNIFICANT CHANGE UP (ref 0–0.9)
INR BLD: 1.22 RATIO — HIGH (ref 0.88–1.16)
LYMPHOCYTES # BLD AUTO: 1.11 K/UL — SIGNIFICANT CHANGE UP (ref 1–3.3)
LYMPHOCYTES # BLD AUTO: 26.1 % — SIGNIFICANT CHANGE UP (ref 13–44)
MAGNESIUM SERPL-MCNC: 2 MG/DL — SIGNIFICANT CHANGE UP (ref 1.6–2.6)
MCHC RBC-ENTMCNC: 29.4 PG — SIGNIFICANT CHANGE UP (ref 27–34)
MCHC RBC-ENTMCNC: 31.4 GM/DL — LOW (ref 32–36)
MCV RBC AUTO: 93.9 FL — SIGNIFICANT CHANGE UP (ref 80–100)
MONOCYTES # BLD AUTO: 0.43 K/UL — SIGNIFICANT CHANGE UP (ref 0–0.9)
MONOCYTES NFR BLD AUTO: 10.1 % — SIGNIFICANT CHANGE UP (ref 2–14)
NEUTROPHILS # BLD AUTO: 2.56 K/UL — SIGNIFICANT CHANGE UP (ref 1.8–7.4)
NEUTROPHILS NFR BLD AUTO: 60.3 % — SIGNIFICANT CHANGE UP (ref 43–77)
NRBC # BLD: 0 /100 WBCS — SIGNIFICANT CHANGE UP (ref 0–0)
PHOSPHATE SERPL-MCNC: 4 MG/DL — SIGNIFICANT CHANGE UP (ref 2.5–4.5)
PLATELET # BLD AUTO: 172 K/UL — SIGNIFICANT CHANGE UP (ref 150–400)
POTASSIUM SERPL-MCNC: 4.8 MMOL/L — SIGNIFICANT CHANGE UP (ref 3.5–5.3)
POTASSIUM SERPL-SCNC: 4.8 MMOL/L — SIGNIFICANT CHANGE UP (ref 3.5–5.3)
PROTHROM AB SERPL-ACNC: 14.2 SEC — HIGH (ref 10.5–13.4)
RBC # BLD: 3.94 M/UL — LOW (ref 4.2–5.8)
RBC # FLD: 13.8 % — SIGNIFICANT CHANGE UP (ref 10.3–14.5)
SODIUM SERPL-SCNC: 140 MMOL/L — SIGNIFICANT CHANGE UP (ref 135–145)
WBC # BLD: 4.25 K/UL — SIGNIFICANT CHANGE UP (ref 3.8–10.5)
WBC # FLD AUTO: 4.25 K/UL — SIGNIFICANT CHANGE UP (ref 3.8–10.5)

## 2022-12-10 PROCEDURE — 85018 HEMOGLOBIN: CPT

## 2022-12-10 PROCEDURE — 84540 ASSAY OF URINE/UREA-N: CPT

## 2022-12-10 PROCEDURE — 85014 HEMATOCRIT: CPT

## 2022-12-10 PROCEDURE — 86325 OTHER IMMUNOELECTROPHORESIS: CPT

## 2022-12-10 PROCEDURE — 87637 SARSCOV2&INF A&B&RSV AMP PRB: CPT

## 2022-12-10 PROCEDURE — 83521 IG LIGHT CHAINS FREE EACH: CPT

## 2022-12-10 PROCEDURE — 86850 RBC ANTIBODY SCREEN: CPT

## 2022-12-10 PROCEDURE — C1769: CPT

## 2022-12-10 PROCEDURE — 85610 PROTHROMBIN TIME: CPT

## 2022-12-10 PROCEDURE — 83516 IMMUNOASSAY NONANTIBODY: CPT

## 2022-12-10 PROCEDURE — 84295 ASSAY OF SERUM SODIUM: CPT

## 2022-12-10 PROCEDURE — 82553 CREATINE MB FRACTION: CPT

## 2022-12-10 PROCEDURE — 86160 COMPLEMENT ANTIGEN: CPT

## 2022-12-10 PROCEDURE — 84156 ASSAY OF PROTEIN URINE: CPT

## 2022-12-10 PROCEDURE — 80053 COMPREHEN METABOLIC PANEL: CPT

## 2022-12-10 PROCEDURE — 73564 X-RAY EXAM KNEE 4 OR MORE: CPT

## 2022-12-10 PROCEDURE — 70450 CT HEAD/BRAIN W/O DYE: CPT | Mod: MA

## 2022-12-10 PROCEDURE — 84100 ASSAY OF PHOSPHORUS: CPT

## 2022-12-10 PROCEDURE — C8929: CPT

## 2022-12-10 PROCEDURE — 71046 X-RAY EXAM CHEST 2 VIEWS: CPT

## 2022-12-10 PROCEDURE — U0003: CPT

## 2022-12-10 PROCEDURE — 82570 ASSAY OF URINE CREATININE: CPT

## 2022-12-10 PROCEDURE — 81001 URINALYSIS AUTO W/SCOPE: CPT

## 2022-12-10 PROCEDURE — 80048 BASIC METABOLIC PNL TOTAL CA: CPT

## 2022-12-10 PROCEDURE — 78830 RP LOCLZJ TUM SPECT W/CT 1: CPT

## 2022-12-10 PROCEDURE — 83880 ASSAY OF NATRIURETIC PEPTIDE: CPT

## 2022-12-10 PROCEDURE — C1887: CPT

## 2022-12-10 PROCEDURE — A9538: CPT

## 2022-12-10 PROCEDURE — 86038 ANTINUCLEAR ANTIBODIES: CPT

## 2022-12-10 PROCEDURE — 85730 THROMBOPLASTIN TIME PARTIAL: CPT

## 2022-12-10 PROCEDURE — 82803 BLOOD GASES ANY COMBINATION: CPT

## 2022-12-10 PROCEDURE — 84484 ASSAY OF TROPONIN QUANT: CPT

## 2022-12-10 PROCEDURE — 80061 LIPID PANEL: CPT

## 2022-12-10 PROCEDURE — 99239 HOSP IP/OBS DSCHRG MGMT >30: CPT | Mod: GC

## 2022-12-10 PROCEDURE — 83036 HEMOGLOBIN GLYCOSYLATED A1C: CPT

## 2022-12-10 PROCEDURE — U0005: CPT

## 2022-12-10 PROCEDURE — 76770 US EXAM ABDO BACK WALL COMP: CPT

## 2022-12-10 PROCEDURE — 93458 L HRT ARTERY/VENTRICLE ANGIO: CPT

## 2022-12-10 PROCEDURE — 84443 ASSAY THYROID STIM HORMONE: CPT

## 2022-12-10 PROCEDURE — 86901 BLOOD TYPING SEROLOGIC RH(D): CPT

## 2022-12-10 PROCEDURE — 84132 ASSAY OF SERUM POTASSIUM: CPT

## 2022-12-10 PROCEDURE — C1894: CPT

## 2022-12-10 PROCEDURE — 83605 ASSAY OF LACTIC ACID: CPT

## 2022-12-10 PROCEDURE — 83735 ASSAY OF MAGNESIUM: CPT

## 2022-12-10 PROCEDURE — 82550 ASSAY OF CK (CPK): CPT

## 2022-12-10 PROCEDURE — 36415 COLL VENOUS BLD VENIPUNCTURE: CPT

## 2022-12-10 PROCEDURE — 86803 HEPATITIS C AB TEST: CPT

## 2022-12-10 PROCEDURE — 83935 ASSAY OF URINE OSMOLALITY: CPT

## 2022-12-10 PROCEDURE — 85025 COMPLETE CBC W/AUTO DIFF WBC: CPT

## 2022-12-10 PROCEDURE — 80074 ACUTE HEPATITIS PANEL: CPT

## 2022-12-10 PROCEDURE — 85027 COMPLETE CBC AUTOMATED: CPT

## 2022-12-10 PROCEDURE — 82435 ASSAY OF BLOOD CHLORIDE: CPT

## 2022-12-10 PROCEDURE — 82962 GLUCOSE BLOOD TEST: CPT

## 2022-12-10 PROCEDURE — 93005 ELECTROCARDIOGRAM TRACING: CPT

## 2022-12-10 PROCEDURE — 82610 CYSTATIN C: CPT

## 2022-12-10 PROCEDURE — 86900 BLOOD TYPING SEROLOGIC ABO: CPT

## 2022-12-10 PROCEDURE — 82330 ASSAY OF CALCIUM: CPT

## 2022-12-10 PROCEDURE — 99285 EMERGENCY DEPT VISIT HI MDM: CPT | Mod: 25

## 2022-12-10 PROCEDURE — 82947 ASSAY GLUCOSE BLOOD QUANT: CPT

## 2022-12-10 PROCEDURE — 86036 ANCA SCREEN EACH ANTIBODY: CPT

## 2022-12-10 PROCEDURE — 86225 DNA ANTIBODY NATIVE: CPT

## 2022-12-10 RX ORDER — METOPROLOL TARTRATE 50 MG
1 TABLET ORAL
Qty: 30 | Refills: 0
Start: 2022-12-10 | End: 2023-01-08

## 2022-12-10 RX ORDER — AMLODIPINE BESYLATE AND BENAZEPRIL HYDROCHLORIDE 10; 20 MG/1; MG/1
1 CAPSULE ORAL
Qty: 0 | Refills: 0 | DISCHARGE

## 2022-12-10 RX ORDER — INSULIN GLARGINE 100 [IU]/ML
17 INJECTION, SOLUTION SUBCUTANEOUS
Qty: 6 | Refills: 0
Start: 2022-12-10 | End: 2023-01-08

## 2022-12-10 RX ORDER — METOPROLOL TARTRATE 50 MG
1 TABLET ORAL
Qty: 0 | Refills: 0 | DISCHARGE

## 2022-12-10 RX ORDER — ATORVASTATIN CALCIUM 80 MG/1
1 TABLET, FILM COATED ORAL
Qty: 0 | Refills: 0 | DISCHARGE

## 2022-12-10 RX ORDER — INSULIN ASPART 100 [IU]/ML
6 INJECTION, SOLUTION SUBCUTANEOUS
Qty: 6 | Refills: 0
Start: 2022-12-10 | End: 2023-01-08

## 2022-12-10 RX ORDER — METFORMIN HYDROCHLORIDE 850 MG/1
1 TABLET ORAL
Qty: 0 | Refills: 0 | DISCHARGE

## 2022-12-10 RX ORDER — ISOPROPYL ALCOHOL, BENZOCAINE .7; .06 ML/ML; ML/ML
1 SWAB TOPICAL
Qty: 100 | Refills: 1
Start: 2022-12-10 | End: 2023-01-28

## 2022-12-10 RX ORDER — INSULIN DETEMIR 100/ML (3)
80 INSULIN PEN (ML) SUBCUTANEOUS
Qty: 0 | Refills: 0 | DISCHARGE

## 2022-12-10 RX ORDER — ASPIRIN/CALCIUM CARB/MAGNESIUM 324 MG
1 TABLET ORAL
Qty: 30 | Refills: 0
Start: 2022-12-10 | End: 2023-01-08

## 2022-12-10 RX ORDER — FUROSEMIDE 40 MG
1 TABLET ORAL
Qty: 30 | Refills: 0
Start: 2022-12-10 | End: 2023-01-08

## 2022-12-10 RX ORDER — ATORVASTATIN CALCIUM 80 MG/1
1 TABLET, FILM COATED ORAL
Qty: 30 | Refills: 0
Start: 2022-12-10 | End: 2023-01-08

## 2022-12-10 RX ORDER — INSULIN ASPART 100 [IU]/ML
45 INJECTION, SOLUTION SUBCUTANEOUS
Qty: 0 | Refills: 0 | DISCHARGE

## 2022-12-10 RX ORDER — APIXABAN 2.5 MG/1
1 TABLET, FILM COATED ORAL
Qty: 60 | Refills: 0
Start: 2022-12-10 | End: 2023-01-08

## 2022-12-10 RX ORDER — ASPIRIN/CALCIUM CARB/MAGNESIUM 324 MG
1 TABLET ORAL
Qty: 0 | Refills: 0 | DISCHARGE

## 2022-12-10 RX ADMIN — Medication 81 MILLIGRAM(S): at 12:10

## 2022-12-10 RX ADMIN — Medication 100 MILLIGRAM(S): at 09:08

## 2022-12-10 RX ADMIN — Medication 6 UNIT(S): at 11:54

## 2022-12-10 RX ADMIN — Medication 4: at 11:54

## 2022-12-10 RX ADMIN — Medication 17 UNIT(S): at 09:48

## 2022-12-10 RX ADMIN — Medication 6 UNIT(S): at 09:01

## 2022-12-10 RX ADMIN — APIXABAN 5 MILLIGRAM(S): 2.5 TABLET, FILM COATED ORAL at 09:08

## 2022-12-10 NOTE — DISCHARGE NOTE NURSING/CASE MANAGEMENT/SOCIAL WORK - NSDCPEFALRISK_GEN_ALL_CORE
For information on Fall & Injury Prevention, visit: [email or web address] OR  [email or web address] OR  https://www.cdc.gov/steadi/patient.html

## 2022-12-10 NOTE — PROGRESS NOTE ADULT - PROBLEM SELECTOR PLAN 1
Orthopnea, elevated proBNP, pulmonary edema suggestive of ADHF  -TTE showed HFrEF (EF 39%)  -on room air  - Card recd IV Lasix 20 dosed daily (held today)  -C/w Toprol  -holding ACE-I i/s/o TATI --> consider switching to ARB/ARNI this admission once renal function improves   - add on SGLT2i for GDMT  -cardiology consulted for evaluation of new onset HF  -Angiogram Monday 12/5 deferred by Cards until further nephro input  - Tc-99m-PYP scan for amyloidosis- strongly suggestive  - Kappa/Thom ratio wnl   NEW  [ ] f/u Cardiac Cath 12/8- non-obstructive mild-moderate CAD   [ ] f/u genetic testing for amyloidosis  - Aspirin, statin, BB Orthopnea, elevated proBNP, pulmonary edema suggestive of ADHF  -TTE showed HFrEF (EF 39%)  -on room air  - Card recd IV Lasix 20 dosed daily (held today)  -C/w Toprol  -holding ACE-I i/s/o TATI --> consider switching to ARB/ARNI this admission once renal function improves   - add on SGLT2i for GDMT  -cardiology consulted for evaluation of new onset HF  -Angiogram Monday 12/5 deferred by Cards until further nephro input  - Tc-99m-PYP scan for amyloidosis- strongly suggestive  - Kappa/Thom ratio wnl   NEW  [ ] f/u Cardiac Cath 12/8- non-obstructive mild-moderate CAD   [ ] f/u genetic testing for amyloidosis  - Aspirin, statin, BB, Lasix on discharge

## 2022-12-10 NOTE — PROGRESS NOTE ADULT - PROBLEM SELECTOR PLAN 2
New A fib, rate mostly contollred  CHADSVASC score of 4  -c/w Hep gtt as per Cards  -C/w BB for rate control  NEW  Hep gtt dc'd  Eliquis script sent to pharmacy (covered by ins)

## 2022-12-10 NOTE — DISCHARGE NOTE NURSING/CASE MANAGEMENT/SOCIAL WORK - NSDCPEEMAIL_GEN_ALL_CORE
[facility] for Tobacco Control email [email or web address]

## 2022-12-10 NOTE — PROGRESS NOTE ADULT - REASON FOR ADMISSION
Hypoglycemia Episodes and Orthopnea with exertional SOB

## 2022-12-10 NOTE — PROGRESS NOTE ADULT - PROBLEM SELECTOR PLAN 4
Per endocrinologist, patient on 80 BID levemir and 45 TID novolog with meals  Unclear compliance per patient history; concern for over medication resulting in hypoglycemic episodes  -started with weight base dosing for insulin (0.3 mg/kg/day) (17 units at bedtime, 6 units premeal)  -low dose ISS (only required 1 additional unit yesterday)  -adjust Insulin as needed based on sliding scale requirements  - Insulin education on discharge  NEW  - changed glargine to detemir per nephro note Per endocrinologist, patient on 80 BID levemir and 45 TID novolog with meals  Unclear compliance per patient history; concern for over medication resulting in hypoglycemic episodes  -started with weight base dosing for insulin (0.3 mg/kg/day) (17 units at bedtime, 6 units premeal)  -low dose ISS (only required 1 additional unit yesterday)  -adjust Insulin as needed based on sliding scale requirements  - Insulin education on discharge  NEW  - discharged on glargine and admelog

## 2022-12-10 NOTE — PROGRESS NOTE ADULT - SUBJECTIVE AND OBJECTIVE BOX
INTERVAL HPI/OVERNIGHT EVENTS:    SUBJECTIVE: Patient seen and examined at bedside.         OBJECTIVE:    VITAL SIGNS:  ICU Vital Signs Last 24 Hrs  T(C): 36.6 (10 Dec 2022 04:15), Max: 36.7 (09 Dec 2022 08:05)  T(F): 97.8 (10 Dec 2022 04:15), Max: 98 (09 Dec 2022 08:05)  HR: 55 (10 Dec 2022 05:29) (55 - 111)  BP: 143/89 (10 Dec 2022 04:15) (115/70 - 165/78)  BP(mean): --  ABP: --  ABP(mean): --  RR: 17 (10 Dec 2022 04:15) (16 - 18)  SpO2: 97% (10 Dec 2022 04:15) (96% - 98%)    O2 Parameters below as of 10 Dec 2022 04:15  Patient On (Oxygen Delivery Method): room air            Plateau pressure:   P/F ratio:     12-09 @ 07:01  -  12-10 @ 07:00  --------------------------------------------------------  IN: 900 mL / OUT: 725 mL / NET: 175 mL      CAPILLARY BLOOD GLUCOSE      POCT Blood Glucose.: 180 mg/dL (09 Dec 2022 22:01)    ECG:    PHYSICAL EXAM:    General: NAD  HEENT: clear conjunctiva  Neck: supple  Respiratory: CTA b/l  Cardiovascular: +S1/S2; RRR  Abdomen: soft, NT/ND; +BS x4  Extremities: 2+ peripheral pulses b/l; no LE edema  Skin: normal color and turgor; no rash  Neurological:    MEDICATIONS:  MEDICATIONS  (STANDING):  apixaban 5 milliGRAM(s) Oral <User Schedule>  aspirin  chewable 81 milliGRAM(s) Oral daily  atorvastatin 80 milliGRAM(s) Oral at bedtime  dextrose 5%. 1000 milliLiter(s) (100 mL/Hr) IV Continuous <Continuous>  dextrose 5%. 1000 milliLiter(s) (50 mL/Hr) IV Continuous <Continuous>  dextrose 50% Injectable 25 Gram(s) IV Push once  dextrose 50% Injectable 12.5 Gram(s) IV Push once  dextrose 50% Injectable 25 Gram(s) IV Push once  glucagon  Injectable 1 milliGRAM(s) IntraMuscular once  insulin detemir injectable (LEVEMIR) 17 Unit(s) SubCutaneous every 24 hours  insulin lispro (ADMELOG) corrective regimen sliding scale   SubCutaneous three times a day before meals  insulin lispro (ADMELOG) corrective regimen sliding scale   SubCutaneous at bedtime  insulin lispro Injectable (ADMELOG) 6 Unit(s) SubCutaneous three times a day before meals  metoprolol succinate  milliGRAM(s) Oral daily    MEDICATIONS  (PRN):  dextrose Oral Gel 15 Gram(s) Oral once PRN Blood Glucose LESS THAN 70 milliGRAM(s)/deciliter      LABS:                        11.6   4.25  )-----------( 172      ( 10 Dec 2022 06:03 )             37.0     12-10    140  |  105  |  24<H>  ----------------------------<  149<H>  4.8   |  24  |  1.83<H>    Ca    9.5      10 Dec 2022 06:05  Phos  4.0     12-10  Mg     2.0     12-10      PT/INR - ( 10 Dec 2022 06:03 )   PT: 14.2 sec;   INR: 1.22 ratio         PTT - ( 10 Dec 2022 06:03 )  PTT:29.8 sec      RADIOLOGY & ADDITIONAL TESTS: Reviewed.     INTERVAL HPI/OVERNIGHT EVENTS:    SUBJECTIVE: Patient seen and examined at bedside.         OBJECTIVE:    VITAL SIGNS:  ICU Vital Signs Last 24 Hrs  T(C): 36.6 (10 Dec 2022 04:15), Max: 36.7 (09 Dec 2022 08:05)  T(F): 97.8 (10 Dec 2022 04:15), Max: 98 (09 Dec 2022 08:05)  HR: 55 (10 Dec 2022 05:29) (55 - 111)  BP: 143/89 (10 Dec 2022 04:15) (115/70 - 165/78)  BP(mean): --  ABP: --  ABP(mean): --  RR: 17 (10 Dec 2022 04:15) (16 - 18)  SpO2: 97% (10 Dec 2022 04:15) (96% - 98%)    O2 Parameters below as of 10 Dec 2022 04:15  Patient On (Oxygen Delivery Method): room air            Plateau pressure:   P/F ratio:     12-09 @ 07:01  -  12-10 @ 07:00  --------------------------------------------------------  IN: 900 mL / OUT: 725 mL / NET: 175 mL      CAPILLARY BLOOD GLUCOSE      POCT Blood Glucose.: 180 mg/dL (09 Dec 2022 22:01)    ECG:    PHYSICAL EXAM:    General: NAD  HEENT: clear conjunctiva  Neck: supple  Respiratory: CTA b/l  Cardiovascular: +S1/S2; RRR  Abdomen: soft, NT/ND; +BS x4  Extremities: 2+ peripheral pulses b/l; 1+ LE edema  Skin: normal color and turgor; no rash  Neurological: AOx3    MEDICATIONS:  MEDICATIONS  (STANDING):  apixaban 5 milliGRAM(s) Oral <User Schedule>  aspirin  chewable 81 milliGRAM(s) Oral daily  atorvastatin 80 milliGRAM(s) Oral at bedtime  dextrose 5%. 1000 milliLiter(s) (100 mL/Hr) IV Continuous <Continuous>  dextrose 5%. 1000 milliLiter(s) (50 mL/Hr) IV Continuous <Continuous>  dextrose 50% Injectable 25 Gram(s) IV Push once  dextrose 50% Injectable 12.5 Gram(s) IV Push once  dextrose 50% Injectable 25 Gram(s) IV Push once  glucagon  Injectable 1 milliGRAM(s) IntraMuscular once  insulin detemir injectable (LEVEMIR) 17 Unit(s) SubCutaneous every 24 hours  insulin lispro (ADMELOG) corrective regimen sliding scale   SubCutaneous three times a day before meals  insulin lispro (ADMELOG) corrective regimen sliding scale   SubCutaneous at bedtime  insulin lispro Injectable (ADMELOG) 6 Unit(s) SubCutaneous three times a day before meals  metoprolol succinate  milliGRAM(s) Oral daily    MEDICATIONS  (PRN):  dextrose Oral Gel 15 Gram(s) Oral once PRN Blood Glucose LESS THAN 70 milliGRAM(s)/deciliter      LABS:                        11.6   4.25  )-----------( 172      ( 10 Dec 2022 06:03 )             37.0     12-10    140  |  105  |  24<H>  ----------------------------<  149<H>  4.8   |  24  |  1.83<H>    Ca    9.5      10 Dec 2022 06:05  Phos  4.0     12-10  Mg     2.0     12-10      PT/INR - ( 10 Dec 2022 06:03 )   PT: 14.2 sec;   INR: 1.22 ratio         PTT - ( 10 Dec 2022 06:03 )  PTT:29.8 sec      RADIOLOGY & ADDITIONAL TESTS: Reviewed.

## 2022-12-10 NOTE — PROGRESS NOTE ADULT - ATTENDING SUPERVISION STATEMENT
Resident
Resident
Fellow
Resident

## 2022-12-10 NOTE — PROGRESS NOTE ADULT - PROBLEM SELECTOR PLAN 3
Elevated CK and + Blood in urine c/f rhabdomyolysis i/s/o hypoglycemic seizures  -Cr 2.02 on presentation; unclear baseline as PCP said he has CKD3, and had Cr>2 on lab imaging in past  -CK significantly downtrending  - repeat UA+Microscopy with significant RBCs 117  - Nephro: TATI on CKD likely 2/2 recent rhabdomyolysis (resolving), CHF, or diuretics   - Nephro recs- give fluids 6 hrs before, 6 hrs after @ 1 cc/kg/hr if planned for angio and euvolemic, and hold diuretics on day of angiogram  - Kidney/Bladder U/S with nonobstructing L renal calculus and bladder wall thickening  [ ] f/u C3 mildly elevated, C4 mildly elevated, dsDNA, anti-GBM, DANDY, ANCA, hepatitis panel- negative  - urology f/u OP for cystoscopy in setting of microhematuria, bladder wall thickening   - Holding diuretics on day of cath  - Cr downtrending  NEW  CTM cr for contrast induced nephropathy

## 2022-12-10 NOTE — PROGRESS NOTE ADULT - ATTENDING COMMENTS
Patient seen and examined. Plan as discussed w/ Dr. Dunn -- patient stable and eager for discharge home on Eliquis for AC, GDMT per cardiology, and insulin supplies/new regimen, w/ close outpatient f/u w/ cardiology, urology, endocrinology, and PMD. Total discharge planning time spent = 40minutes.

## 2022-12-10 NOTE — DISCHARGE NOTE NURSING/CASE MANAGEMENT/SOCIAL WORK - NSDCPEWEB_GEN_ALL_CORE
[facility] for Tobacco Control website --- [email or web address] website --- [email or web address]

## 2022-12-10 NOTE — PROGRESS NOTE ADULT - PROVIDER SPECIALTY LIST ADULT
Cardiology
Internal Medicine
Nephrology
Cardiology
Nephrology
Internal Medicine

## 2022-12-10 NOTE — DISCHARGE NOTE NURSING/CASE MANAGEMENT/SOCIAL WORK - PATIENT PORTAL LINK FT
You can access the FollowMyHealth Patient Portal offered by [facility] by registering at the following website: [email or web address]. By joining [facility]’s FollowMyHealth portal, you will also be able to view your health information using other applications (apps) compatible with our system.

## 2022-12-14 ENCOUNTER — APPOINTMENT (OUTPATIENT)
Dept: CARDIOLOGY | Facility: CLINIC | Age: 72
End: 2022-12-14
Payer: MEDICARE

## 2022-12-14 PROCEDURE — 99204 OFFICE O/P NEW MOD 45 MIN: CPT | Mod: 95

## 2022-12-14 PROCEDURE — 99214 OFFICE O/P EST MOD 30 MIN: CPT | Mod: 95

## 2022-12-14 NOTE — HISTORY OF PRESENT ILLNESS
[Home] : at home, [unfilled] , at the time of the visit. [Medical Office: ([facility])___] : at the medical office located in  [Verbal consent obtained from patient] : the patient, [unfilled] [FreeTextEntry1] : ARMIN MAYA is a 71 yo male with a PMH of DM, HTN, HLD & hypoglycemic seizures. Presented to the\par ED with dyspnea and orthopnea and was admitted with heart failure exacerbation. He was found to have have new onset HFrEF and new onset afib \par \par - CXR 11/30: Cardiac size is within normal limits. Trace right sided pleural\par effusion\par - TTE 12/1: EF 39%, Calcified aortic valve. The non and the left cusp appear\par functionally fused by calcification. Peak transaortic valve gradient equals 9\par mm Hg, estimated aortic valve area equals 2.3 sqcm. 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

## 2022-12-14 NOTE — REASON FOR VISIT
[FreeTextEntry3] : Dear Dr. Martinez and Dr Hernadez       . I saw your patient ARMIN MAYA on 12/14/2022 . Please see the note below for the assessment and plan. \par \par ARMIN MAYA  was seen  for an initial consultation at the [facility] at [facility] on 12/14/2022.   Mr. MAYA was referred by  Dr Martinez for hereditary cardiac predisposition risk assessment and counseling, due to hx TTR CA\par \par

## 2022-12-14 NOTE — FAMILY HISTORY
[FreeTextEntry1] : FamilyHistory_20_twCiteListControlStart FamilyHistory_20_twCiteListControlEnd [id number] \par A four-generation family history was constructed and scanned into [employer]. \par \par \par his maternal families originate from AA and  descent and paternal families of AA and  descent. \par No Ashkenazi Pentec[employer]al ancestry. \par Family history was negative for consanguinity  \par No family history of SIDS\par  \par \par  [FreeTextEntry2] : AA and  descent [FreeTextEntry3] : AA and  descent

## 2022-12-29 ENCOUNTER — NON-APPOINTMENT (OUTPATIENT)
Age: 72
End: 2022-12-29

## 2022-12-29 ENCOUNTER — APPOINTMENT (OUTPATIENT)
Dept: CARDIOLOGY | Facility: CLINIC | Age: 72
End: 2022-12-29
Payer: MEDICARE

## 2022-12-29 VITALS
WEIGHT: 241 LBS | HEART RATE: 67 BPM | SYSTOLIC BLOOD PRESSURE: 155 MMHG | HEIGHT: 71 IN | OXYGEN SATURATION: 98 % | BODY MASS INDEX: 33.74 KG/M2 | DIASTOLIC BLOOD PRESSURE: 94 MMHG

## 2022-12-29 PROCEDURE — 93000 ELECTROCARDIOGRAM COMPLETE: CPT

## 2022-12-29 PROCEDURE — 99214 OFFICE O/P EST MOD 30 MIN: CPT | Mod: 25

## 2022-12-29 PROCEDURE — 99204 OFFICE O/P NEW MOD 45 MIN: CPT | Mod: 25

## 2022-12-29 RX ORDER — GLIMEPIRIDE 4 MG/1
4 TABLET ORAL
Qty: 90 | Refills: 0 | Status: DISCONTINUED | COMMUNITY
Start: 2017-12-20 | End: 2022-12-29

## 2022-12-29 RX ORDER — TADALAFIL 20 MG/1
20 TABLET, FILM COATED ORAL
Refills: 0 | Status: DISCONTINUED | COMMUNITY
End: 2022-12-29

## 2022-12-29 RX ORDER — AMLODIPINE BESYLATE AND BENAZEPRIL HYDROCHLORIDE 5; 20 MG/1; MG/1
5-20 CAPSULE ORAL
Qty: 90 | Refills: 0 | Status: DISCONTINUED | COMMUNITY
Start: 2018-10-12 | End: 2022-12-29

## 2022-12-29 RX ORDER — VALSARTAN AND HYDROCHLOROTHIAZIDE 160; 12.5 MG/1; MG/1
160-12.5 TABLET, FILM COATED ORAL
Qty: 90 | Refills: 0 | Status: DISCONTINUED | COMMUNITY
Start: 2018-02-08 | End: 2022-12-29

## 2022-12-29 RX ORDER — METOPROLOL SUCCINATE 50 MG/1
50 TABLET, EXTENDED RELEASE ORAL DAILY
Qty: 90 | Refills: 3 | Status: ACTIVE | COMMUNITY
Start: 2019-03-21 | End: 1900-01-01

## 2022-12-29 RX ORDER — AMLODIPINE BESYLATE 5 MG/1
5 TABLET ORAL
Qty: 90 | Refills: 0 | Status: DISCONTINUED | COMMUNITY
Start: 2017-12-20 | End: 2022-12-29

## 2022-12-29 RX ORDER — TAMSULOSIN HYDROCHLORIDE 0.4 MG/1
0.4 CAPSULE ORAL
Qty: 30 | Refills: 5 | Status: DISCONTINUED | COMMUNITY
Start: 2018-04-13 | End: 2022-12-29

## 2022-12-29 RX ORDER — MELOXICAM 15 MG/1
15 TABLET ORAL
Qty: 30 | Refills: 2 | Status: DISCONTINUED | COMMUNITY
Start: 2019-07-09 | End: 2022-12-29

## 2022-12-30 ENCOUNTER — APPOINTMENT (OUTPATIENT)
Dept: CARDIOLOGY | Facility: CLINIC | Age: 72
End: 2022-12-30

## 2022-12-30 ENCOUNTER — NON-APPOINTMENT (OUTPATIENT)
Age: 72
End: 2022-12-30

## 2022-12-30 LAB
ANION GAP SERPL CALC-SCNC: 10 MMOL/L
BUN SERPL-MCNC: 28 MG/DL
CALCIUM SERPL-MCNC: 9.6 MG/DL
CHLORIDE SERPL-SCNC: 106 MMOL/L
CO2 SERPL-SCNC: 26 MMOL/L
CREAT SERPL-MCNC: 2 MG/DL
EGFR: 35 ML/MIN/1.73M2
GLUCOSE SERPL-MCNC: 176 MG/DL
NT-PROBNP SERPL-MCNC: 2317 PG/ML
POTASSIUM SERPL-SCNC: 5.1 MMOL/L
SODIUM SERPL-SCNC: 141 MMOL/L

## 2022-12-30 NOTE — HISTORY OF PRESENT ILLNESS
[FreeTextEntry1] : Ananda is a 72-year-old gentleman DM, HTN, HLD, nonobstructive CAD, HFrEF with EF=39% who presents for evaluation.Discharged 12/9/22. He was was found to be in afib and started on eliquis and toprol. Weighs himself daily and legs are getting more swollen.Nuclear scan suggestive of amyloidosis. Stopped metformin.

## 2022-12-30 NOTE — REVIEW OF SYSTEMS
[SOB] : shortness of breath [Dyspnea on exertion] : dyspnea during exertion [Chest Discomfort] : no chest discomfort [Lower Ext Edema] : lower extremity edema [Negative] : Heme/Lymph

## 2022-12-30 NOTE — DISCUSSION/SUMMARY
[FreeTextEntry1] : The patient is a 72-year-old gentleman DM, HTN, HLD, nonobstructive CAD,PAF,  HFrEF with EF=39% with LE edema.\par #1 CV- cardiogenetic results pending for amyloid w/u, nonobstructive CAD\par #2 HFrEF- severe LVH with mod global LV dysfunction and EF=39%, increase furosemide to 40mg daily\par #3 PAF- now in sinus, c/w eliquis, decrease toprol to 50mg \par #4 HTN- controlled on metoprolol for now\par #5 HLD- only on zetia\par #6 DM- on levemir and novolug [EKG obtained to assist in diagnosis and management of assessed problem(s)] : EKG obtained to assist in diagnosis and management of assessed problem(s)

## 2023-01-12 ENCOUNTER — APPOINTMENT (OUTPATIENT)
Dept: CARDIOLOGY | Facility: CLINIC | Age: 73
End: 2023-01-12
Payer: MEDICARE

## 2023-01-12 ENCOUNTER — NON-APPOINTMENT (OUTPATIENT)
Age: 73
End: 2023-01-12

## 2023-01-12 VITALS
HEART RATE: 87 BPM | SYSTOLIC BLOOD PRESSURE: 152 MMHG | DIASTOLIC BLOOD PRESSURE: 87 MMHG | HEIGHT: 71 IN | BODY MASS INDEX: 33.32 KG/M2 | WEIGHT: 238 LBS | OXYGEN SATURATION: 98 %

## 2023-01-12 DIAGNOSIS — I51.9 HEART DISEASE, UNSPECIFIED: ICD-10-CM

## 2023-01-12 LAB
ANION GAP SERPL CALC-SCNC: 10 MMOL/L
BUN SERPL-MCNC: 36 MG/DL
CALCIUM SERPL-MCNC: 9.8 MG/DL
CHLORIDE SERPL-SCNC: 102 MMOL/L
CO2 SERPL-SCNC: 24 MMOL/L
CREAT SERPL-MCNC: 1.9 MG/DL
EGFR: 37 ML/MIN/1.73M2
GLUCOSE SERPL-MCNC: 347 MG/DL
NT-PROBNP SERPL-MCNC: 1119 PG/ML
POTASSIUM SERPL-SCNC: 5.1 MMOL/L
SODIUM SERPL-SCNC: 136 MMOL/L

## 2023-01-12 PROCEDURE — 99214 OFFICE O/P EST MOD 30 MIN: CPT | Mod: 25

## 2023-01-12 PROCEDURE — 93000 ELECTROCARDIOGRAM COMPLETE: CPT

## 2023-01-12 RX ORDER — APIXABAN 5 MG/1
5 TABLET, FILM COATED ORAL
Qty: 180 | Refills: 1 | Status: ACTIVE | COMMUNITY
Start: 2022-12-29 | End: 1900-01-01

## 2023-01-12 RX ORDER — INSULIN DETEMIR 100 [IU]/ML
100 INJECTION, SOLUTION SUBCUTANEOUS
Qty: 120 | Refills: 0 | Status: ACTIVE | COMMUNITY
Start: 2017-07-19

## 2023-01-12 RX ORDER — INSULIN ASPART 100 [IU]/ML
100 INJECTION, SOLUTION INTRAVENOUS; SUBCUTANEOUS
Refills: 0 | Status: ACTIVE | COMMUNITY
Start: 2022-12-29

## 2023-01-13 NOTE — HISTORY OF PRESENT ILLNESS
[FreeTextEntry1] : Ananda feels better with lasix 40mg. Heart rate goes up to 150 when exerts  himself. Wants to go back to the gym

## 2023-01-13 NOTE — REVIEW OF SYSTEMS
[SOB] : shortness of breath [Dyspnea on exertion] : dyspnea during exertion [Lower Ext Edema] : lower extremity edema [Negative] : Heme/Lymph [Chest Discomfort] : no chest discomfort

## 2023-01-13 NOTE — DISCUSSION/SUMMARY
[FreeTextEntry1] : The patient is a 72-year-old gentleman DM, HTN, HLD, nonobstructive CAD,PAF,  HFrEF with EF=39% with LE edema.\par #1 CV- cardio-genetic results pending for amyloid w/u, nonobstructive CAD\par #2 HFrEF- severe LVH with mod global LV dysfunction and EF=39%, c/w furosemide 40mg daily\par #3 PAF- now in sinus, c/w eliquis, c/w toprol 50mg \par #4 HTN- controlled on metoprolol for now\par #5 HLD- only on zetia\par #6 DM- on levemir and novolug [EKG obtained to assist in diagnosis and management of assessed problem(s)] : EKG obtained to assist in diagnosis and management of assessed problem(s)

## 2023-02-01 ENCOUNTER — APPOINTMENT (OUTPATIENT)
Dept: CARDIOLOGY | Facility: CLINIC | Age: 73
End: 2023-02-01

## 2023-02-23 ENCOUNTER — NON-APPOINTMENT (OUTPATIENT)
Age: 73
End: 2023-02-23

## 2023-02-23 ENCOUNTER — APPOINTMENT (OUTPATIENT)
Dept: CARDIOLOGY | Facility: CLINIC | Age: 73
End: 2023-02-23
Payer: MEDICARE

## 2023-02-23 VITALS
OXYGEN SATURATION: 98 % | DIASTOLIC BLOOD PRESSURE: 90 MMHG | BODY MASS INDEX: 33.32 KG/M2 | HEIGHT: 71 IN | SYSTOLIC BLOOD PRESSURE: 151 MMHG | HEART RATE: 78 BPM | WEIGHT: 238 LBS

## 2023-02-23 PROCEDURE — 99213 OFFICE O/P EST LOW 20 MIN: CPT | Mod: 25

## 2023-02-23 PROCEDURE — 93000 ELECTROCARDIOGRAM COMPLETE: CPT

## 2023-02-23 RX ORDER — ATORVASTATIN CALCIUM 40 MG/1
40 TABLET, FILM COATED ORAL
Qty: 1 | Refills: 1 | Status: DISCONTINUED | COMMUNITY
Start: 2023-01-20 | End: 2023-02-23

## 2023-02-24 NOTE — DISCUSSION/SUMMARY
[FreeTextEntry1] : The patient is a 72-year-old gentleman DM, HTN, HLD, nonobstructive CAD,PAF,  HFrEF with EF=39% with LE edema .\par #1 CV- cardio-genetic results pending for amyloid w/u, nonobstructive CAD\par #2 HFrEF- severe LVH with mod global LV dysfunction and EF=39%, c/w furosemide 40mg daily\par #3 PAF- now in sinus, c/w eliquis, c/w toprol 50mg \par #4 HTN- controlled on metoprolol for now\par #5 HLD- only on zetia\par #6 DM- on levemir and novolug, labs today,f/u Endo [EKG obtained to assist in diagnosis and management of assessed problem(s)] : EKG obtained to assist in diagnosis and management of assessed problem(s)

## 2023-03-23 ENCOUNTER — NON-APPOINTMENT (OUTPATIENT)
Age: 73
End: 2023-03-23

## 2023-03-24 ENCOUNTER — INPATIENT (INPATIENT)
Facility: HOSPITAL | Age: 73
LOS: 4 days | Discharge: HOME | DRG: 291 | End: 2023-03-29
Attending: INTERNAL MEDICINE | Admitting: INTERNAL MEDICINE
Payer: COMMERCIAL

## 2023-03-24 VITALS
TEMPERATURE: 98 F | HEIGHT: 60 IN | WEIGHT: 235.89 LBS | OXYGEN SATURATION: 97 % | DIASTOLIC BLOOD PRESSURE: 96 MMHG | HEART RATE: 82 BPM | SYSTOLIC BLOOD PRESSURE: 167 MMHG | RESPIRATION RATE: 19 BRPM

## 2023-03-24 LAB
ALBUMIN SERPL ELPH-MCNC: 4.3 G/DL — SIGNIFICANT CHANGE UP (ref 3.3–5)
ALP SERPL-CCNC: 53 U/L — SIGNIFICANT CHANGE UP (ref 40–120)
ALT FLD-CCNC: 25 U/L — SIGNIFICANT CHANGE UP (ref 10–45)
ANION GAP SERPL CALC-SCNC: 12 MMOL/L — SIGNIFICANT CHANGE UP (ref 5–17)
AST SERPL-CCNC: 25 U/L — SIGNIFICANT CHANGE UP (ref 10–40)
BASOPHILS # BLD AUTO: 0.03 K/UL — SIGNIFICANT CHANGE UP (ref 0–0.2)
BASOPHILS NFR BLD AUTO: 0.6 % — SIGNIFICANT CHANGE UP (ref 0–2)
BILIRUB SERPL-MCNC: 0.4 MG/DL — SIGNIFICANT CHANGE UP (ref 0.2–1.2)
BUN SERPL-MCNC: 28 MG/DL — HIGH (ref 7–23)
CALCIUM SERPL-MCNC: 9.6 MG/DL — SIGNIFICANT CHANGE UP (ref 8.4–10.5)
CHLORIDE SERPL-SCNC: 102 MMOL/L — SIGNIFICANT CHANGE UP (ref 96–108)
CO2 SERPL-SCNC: 26 MMOL/L — SIGNIFICANT CHANGE UP (ref 22–31)
CREAT SERPL-MCNC: 1.84 MG/DL — HIGH (ref 0.5–1.3)
EGFR: 39 ML/MIN/1.73M2 — LOW
EOSINOPHIL # BLD AUTO: 0.14 K/UL — SIGNIFICANT CHANGE UP (ref 0–0.5)
EOSINOPHIL NFR BLD AUTO: 2.7 % — SIGNIFICANT CHANGE UP (ref 0–6)
GLUCOSE SERPL-MCNC: 175 MG/DL — HIGH (ref 70–99)
HCT VFR BLD CALC: 44.4 % — SIGNIFICANT CHANGE UP (ref 39–50)
HGB BLD-MCNC: 14.1 G/DL — SIGNIFICANT CHANGE UP (ref 13–17)
IMM GRANULOCYTES NFR BLD AUTO: 0.4 % — SIGNIFICANT CHANGE UP (ref 0–0.9)
LYMPHOCYTES # BLD AUTO: 1.22 K/UL — SIGNIFICANT CHANGE UP (ref 1–3.3)
LYMPHOCYTES # BLD AUTO: 23.4 % — SIGNIFICANT CHANGE UP (ref 13–44)
MCHC RBC-ENTMCNC: 29.1 PG — SIGNIFICANT CHANGE UP (ref 27–34)
MCHC RBC-ENTMCNC: 31.8 GM/DL — LOW (ref 32–36)
MCV RBC AUTO: 91.5 FL — SIGNIFICANT CHANGE UP (ref 80–100)
MONOCYTES # BLD AUTO: 0.51 K/UL — SIGNIFICANT CHANGE UP (ref 0–0.9)
MONOCYTES NFR BLD AUTO: 9.8 % — SIGNIFICANT CHANGE UP (ref 2–14)
NEUTROPHILS # BLD AUTO: 3.3 K/UL — SIGNIFICANT CHANGE UP (ref 1.8–7.4)
NEUTROPHILS NFR BLD AUTO: 63.1 % — SIGNIFICANT CHANGE UP (ref 43–77)
NRBC # BLD: 0 /100 WBCS — SIGNIFICANT CHANGE UP (ref 0–0)
NT-PROBNP SERPL-SCNC: 1691 PG/ML — HIGH (ref 0–300)
PLATELET # BLD AUTO: 203 K/UL — SIGNIFICANT CHANGE UP (ref 150–400)
POTASSIUM SERPL-MCNC: 4.3 MMOL/L — SIGNIFICANT CHANGE UP (ref 3.5–5.3)
POTASSIUM SERPL-SCNC: 4.3 MMOL/L — SIGNIFICANT CHANGE UP (ref 3.5–5.3)
PROT SERPL-MCNC: 7.8 G/DL — SIGNIFICANT CHANGE UP (ref 6–8.3)
RBC # BLD: 4.85 M/UL — SIGNIFICANT CHANGE UP (ref 4.2–5.8)
RBC # FLD: 13.2 % — SIGNIFICANT CHANGE UP (ref 10.3–14.5)
SODIUM SERPL-SCNC: 140 MMOL/L — SIGNIFICANT CHANGE UP (ref 135–145)
TROPONIN T, HIGH SENSITIVITY RESULT: 94 NG/L — HIGH (ref 0–51)
WBC # BLD: 5.22 K/UL — SIGNIFICANT CHANGE UP (ref 3.8–10.5)
WBC # FLD AUTO: 5.22 K/UL — SIGNIFICANT CHANGE UP (ref 3.8–10.5)

## 2023-03-24 PROCEDURE — 99223 1ST HOSP IP/OBS HIGH 75: CPT

## 2023-03-24 PROCEDURE — 71046 X-RAY EXAM CHEST 2 VIEWS: CPT | Mod: 26

## 2023-03-24 RX ORDER — IPRATROPIUM BROMIDE 0.2 MG/ML
500 SOLUTION, NON-ORAL INHALATION ONCE
Refills: 0 | Status: COMPLETED | OUTPATIENT
Start: 2023-03-24 | End: 2023-03-24

## 2023-03-24 RX ORDER — IPRATROPIUM BROMIDE 0.2 MG/ML
8 SOLUTION, NON-ORAL INHALATION ONCE
Refills: 0 | Status: DISCONTINUED | OUTPATIENT
Start: 2023-03-24 | End: 2023-03-24

## 2023-03-24 RX ORDER — FUROSEMIDE 40 MG
40 TABLET ORAL ONCE
Refills: 0 | Status: COMPLETED | OUTPATIENT
Start: 2023-03-24 | End: 2023-03-24

## 2023-03-24 RX ORDER — ALBUTEROL 90 UG/1
8 AEROSOL, METERED ORAL ONCE
Refills: 0 | Status: COMPLETED | OUTPATIENT
Start: 2023-03-24 | End: 2023-03-24

## 2023-03-24 RX ADMIN — ALBUTEROL 8 PUFF(S): 90 AEROSOL, METERED ORAL at 19:48

## 2023-03-24 RX ADMIN — Medication 500 MICROGRAM(S): at 19:49

## 2023-03-24 RX ADMIN — Medication 40 MILLIGRAM(S): at 19:48

## 2023-03-24 NOTE — ED ADULT NURSE NOTE - SPUTUM AMOUNT
Vascular Surgery Consult for Venous Disease     I am seeing Noemí Joseph on 2022 in consultation at the request of Ml Boudreaux NP, for evaluation of varicose veins.    CHIEF COMPLAINT:  Bilateral LE bulging varicose veins.     HISTORY OF PRESENT ILLNESS: Noemí Joseph is a 36 year old female who has some painful varicosities on both LE's.  She is bothered by the appearance, but also has significant symptoms of fatigue and pain.  Is on her feet all day.      VENOUS DISEASE HISTORY  Pain:  yes  Swelling:  yes  Thrombosis:  no   Bleeding:  no  Ulcer:  no  Compression Stockings       Length of time used:  For about 12 months       mmHg compression:  unsure       Benefit:  May have helped slow the progression   Elevation:  In evenings   Exercise:  Regularly, and teaches fitness classes    Medications:  no     Impact on Daily Life:  Discomfort when she is on her feet for prolonged times      VASCULAR PROCEDURES:      PROBLEM LIST:    Patient Active Problem List   Diagnosis   • H/O:  section        PMH:    Past Medical History:   Diagnosis Date   • Abnormal Pap smear of cervix      ASCUS HPV-,  ASCUS HPV+, 3/15 LSIL HPV+,  ASCUS HPV+   • DAVID I (cervical intraepithelial neoplasia I)    • H/O mitral valve prolapse     No further follow up needed after age 10   • HPV (human papilloma virus) infection    • Situational anxiety         PSH:    Past Surgical History:   Procedure Laterality Date   •  section, low transverse  10/26/2015    Primary    •  section, low transverse  09/15/2017    Repeat  Section   • Colposcopy  2018    DAVID-I   • Martin tooth extraction         CURRENT MEDICATIONS:   Current Outpatient Medications   Medication Sig Dispense Refill   • Levonorgest-Eth Estrad 91-Day (AMETHIA LO) 0.1-0.02 & 0.01 MG Tab Take 1 tablet by mouth daily. 91 tablet 5     No current facility-administered medications for this visit.       HOME  MEDICATIONS:  Prior to Admission medications    Medication Sig Start Date End Date Taking? Authorizing Provider   Levonorgest-Eth Estrad 91-Day (AMETHIA LO) 0.1-0.02 & 0.01 MG Tab Take 1 tablet by mouth daily. 5/2/22   Ml Boudreaux NP         ALLERGIES:  ALLERGIES:  No Known Allergies     SOCIAL HISTORY:   Social History     Tobacco Use   • Smoking status: Never Smoker   • Smokeless tobacco: Never Used   Substance Use Topics   • Alcohol use: Yes     Comment: Rare       FAMILY HISTORY:   Family History   Problem Relation Age of Onset   • Alcohol Abuse Father    • Cancer Maternal Grandmother    • Cancer, Prostate Maternal Grandfather    • Cancer, Breast Maternal Grandfather    • Other Other         Patient has no paternal hx     PHYSICAL EXAM:  Visit Vitals  /70   Pulse 66   Temp 97.1 °F (36.2 °C) (Tympanic)   Resp 16   Ht 5' 8\" (1.727 m)   Wt 70.5 kg (155 lb 6.8 oz)   LMP 05/17/2022   BMI 23.63 kg/m²     Constitutional:  Well developed, well nourished, no acute distress, non-toxic appearance, able to get onto the examining table with no assistance   Eyes:  PERRL, conjunctiva normal   Extremities:  No edema, no tenderness, no deformities.  There are varicose veins about 6mm in diameter in the R medial and anterior leg, 7 mm varicosities in the L medial thigh and calf                Skin:  Otherwise intact, healthy appearing, normal color   Lymphatic:  No lymphadenopathy noted      LABS:  No results found for this or any previous visit (from the past 24 hour(s)).    IMAGING:   No results found.    IMPRESSION:  Noemí Joseph is a 36 year old female with bilateral LE varicosities in the GSV distribution.    PLAN:  A prescription for support stockings was not needed.  We will arrange for a venous insufficiency study of both LE's.   We discussed the procedures of vein stripping, ablation of the saphenous vein, stab phlebectomy, and sclerotherapy.  We will contact the patient with the results of the venous  insufficiency study and with a treatment plan recommendation.   moderate

## 2023-03-24 NOTE — ED PROVIDER NOTE - CLINICAL SUMMARY MEDICAL DECISION MAKING FREE TEXT BOX
71yo male with CHF HTN HLD and Afib here with worsening cough and extertional dyspnea c/w acute on chronic CHF. Patient hemodynamically stable, normoxemic at this time. Will obtain CHF labs and CXR to evaluate for fluid status and degree of hreat failure.  Parvez Ortiz DO  PGY6 Pediatric Emergency Fellow 71yo male with CHF HTN HLD and Afib here with worsening cough and extertional dyspnea c/w acute on chronic CHF. Patient hemodynamically stable, normoxemic at this time. Will obtain CHF labs and CXR to evaluate for fluid status and degree of hreat failure.  Parvez Ortiz DO  PGY6 Pediatric Emergency Fellow    Lori Chamorro MD - Attending Physician: Pt here with diff breathing, GUILLEN and PND in setting of known CHF, likely due to acute exacerbation. Compliant with meds. no fevers. No hypoxia. Labs, possible admit vs CDU if not improving

## 2023-03-24 NOTE — ED PROVIDER NOTE - PHYSICAL EXAMINATION
Physical exam:   Gen: Well developed, NAD; non toxic appearing  HEENT: NC/AT, PERRL, moist mucous membranes  CVS: +S1, S2, RRR, no murmurs  Lungs: +Diffuse crackles and wheezing without accessory muscle use or tachypnea  Abdomen: soft, nontender/nondistended, +BS  Ext: Bilateral feet with 1+ non pitting edema ; cap refill < 2 seconds  Skin: no rashes or skin break down  Neuro: Awake/alert, no focal deficit  -Exam performed by Angel ANDRADE, PGY6

## 2023-03-24 NOTE — ED PROVIDER NOTE - PROGRESS NOTE DETAILS
Patient with troponin 90. Will repeat at 2130. Patient with continued wheezing- will give albuterol/atrovent and reassess. Furosemide 40mg given for likely acute on chronic HF  Parvez Ortiz DO  PGY6 Pediatric Emergency Fellow Discussed care with CDU team. Will admit to CDU for continued diuresis  Parvez Ortiz DO  PGY6 Pediatric Emergency Fellow

## 2023-03-24 NOTE — ED CDU PROVIDER INITIAL DAY NOTE - DETAILS
tele, lasix, discuss with cardiology in the morning, repeat labs, pulse ox, neb treatments as needed

## 2023-03-24 NOTE — ED PROVIDER NOTE - CARE PLAN
Principal Discharge DX:	Congestive heart failure  Secondary Diagnosis:	Shortness of breath  Secondary Diagnosis:	Acute bronchitis due to human metapneumovirus   1

## 2023-03-24 NOTE — ED CDU PROVIDER INITIAL DAY NOTE - OBJECTIVE STATEMENT
71 y/o  with HTN, HLD, DM2, and Congestive heart failure here with cough for 5 days and new onset SOB. Patient reports he has a wet cough for last 5 days and overnight last evening had worsening shortness of breath. He reports being compliant with home medications, taking 40mg of Lasix today. He denies headache, throat pain, chest pain, dizziness, nausea, vomiting, diarrhea. Denies new lower extremity swelling.

## 2023-03-24 NOTE — ED PROVIDER NOTE - NSICDXFAMILYHX_GEN_ALL_CORE_FT
FAMILY HISTORY:  Mother  Still living? Unknown  FHx: myocardial infarction, Age at diagnosis: Age Unknown    Sibling  Still living? Unknown  FHx: myocardial infarction, Age at diagnosis: Age Unknown     Topical Retinoid counseling:  Patient advised to apply a pea-sized amount only at bedtime and wait 30 minutes after washing their face before applying.  If too drying, patient may add a non-comedogenic moisturizer. The patient verbalized understanding of the proper use and possible adverse effects of retinoids.  All of the patient's questions and concerns were addressed.

## 2023-03-24 NOTE — ED CDU PROVIDER INITIAL DAY NOTE - NS ED ROS FT
Constitutional: No fever or chills  Eyes: No visual changes, no eye pain  CV: No chest pain or lower extremity edema  Resp: + SOB + cough  GI: No abd pain. No nausea or vomiting. No diarrhea.   : No dysuria, hematuria.   MSK: No musculoskeletal pain  Skin: No rash  Psych: No complaints   Neuro: No headache. No numbness or tingling. No weakness.  Endo: +known diabetes

## 2023-03-24 NOTE — ED ADULT NURSE NOTE - OBJECTIVE STATEMENT
72 y.o M BI self p/w c/o SOB. A+Ox4. Pt states for the past week he has been having increased SOB w/ lying and walking. Endorsing cough and discomfort. Upon initial assessment productive cough w/ frothy white thick sputum noted. Wheezes auscultated B/L lung fields. States had fluid around heart back in dec, treated w/ diuretic outpatient. Pt endorsing this SOB feels "different" than from dec. Pt states just got back from [facility] 1 wk ago.

## 2023-03-24 NOTE — ED ADULT NURSE REASSESSMENT NOTE - NS ED NURSE REASSESS COMMENT FT1
Report received from SUZANNA Faith. Pt noted to be resting in stretcher, VSS, A&OX3, NAD noted. Pt denies chest pain, SOB at this time. Breathing nonlabored with RR and O2 sat within normal limits on 2L. Abdomen soft, distended, nontender. Pt states is baseline appearance. Plan of care discussed with pt and verbalizes understanding. Safety and comfort measures maintained.

## 2023-03-24 NOTE — ED CDU PROVIDER INITIAL DAY NOTE - PROGRESS NOTE DETAILS
Patient states that he takes his long acting insulin 3 times a day 20 units with meals and "quick" acting at night 50 units. Per discharge report from last year appears patient was discharged on Lantus 17 unit(s) subcutaneous once a day in the morning before breakfast and NovoLOG 6 unit(s) subcutaneous 3 times a day (before meals). Will keep this dose for now and monitor glucose, will attempt to clarify dosing.   Patient also states that he takes Lasix 40mg daily, taking a dose today.- Ange Garrido PA-C

## 2023-03-24 NOTE — ED PROVIDER NOTE - OBJECTIVE STATEMENT
73 y/o  with HTN HLD DM2 and congestive heart failure here w cough for 5 days and new onset SOB. Patient reports he has a wet cough for last 5 days and overnight last evening had worsening shortness of breath. he reports worsening symptoms when supine. He reports being compliant with home medications and took lasix 20mg today. He denies headache, throat pain, chest pain, dizziness, nausea, vomiting, diarrhea or new rash. Patient reports normoglycemia without insulin changes in last 5 days

## 2023-03-24 NOTE — ED CDU PROVIDER INITIAL DAY NOTE - NS ED ATTENDING STATEMENT MOD
This was a shared visit with the HASMUKH. I reviewed and verified the documentation and independently performed the documented:

## 2023-03-24 NOTE — ED CDU PROVIDER INITIAL DAY NOTE - CLINICAL SUMMARY MEDICAL DECISION MAKING FREE TEXT BOX
Lroi Chamorro MD - Attending Physician: Pt here with CHF exacerbation and SOB. Initial w/up neg. RVP pending. To CDU for diuresis, tele monitoring and nebs as needed

## 2023-03-25 DIAGNOSIS — R06.02 SHORTNESS OF BREATH: ICD-10-CM

## 2023-03-25 LAB
GLUCOSE BLDC GLUCOMTR-MCNC: 138 MG/DL — HIGH (ref 70–99)
HMPV RNA SPEC QL NAA+PROBE: DETECTED
RAPID RVP RESULT: DETECTED
SARS-COV-2 RNA SPEC QL NAA+PROBE: SIGNIFICANT CHANGE UP
TROPONIN T, HIGH SENSITIVITY RESULT: 96 NG/L — HIGH (ref 0–51)

## 2023-03-25 PROCEDURE — 99233 SBSQ HOSP IP/OBS HIGH 50: CPT

## 2023-03-25 PROCEDURE — 93306 TTE W/DOPPLER COMPLETE: CPT | Mod: 26

## 2023-03-25 PROCEDURE — 76376 3D RENDER W/INTRP POSTPROCES: CPT | Mod: 26

## 2023-03-25 PROCEDURE — 93356 MYOCRD STRAIN IMG SPCKL TRCK: CPT

## 2023-03-25 RX ADMIN — Medication 200 MILLIGRAM(S): at 20:55

## 2023-03-25 NOTE — H&P ADULT - HISTORY OF PRESENT ILLNESS
73 y/o  with HTN, HLD, DM2, and Congestive heart failure here with cough for 5 days and new onset SOB. Patient reports he has a wet cough for last 5 days and overnight last evening had worsening shortness of breath. He reports being compliant with home medications, taking 40mg of Lasix today. He denies headache, throat pain, chest pain, dizziness, nausea, vomiting, diarrhea. Denies new lower extremity swelling.  in cdu, pt with continued sob/dyspnea, little exertion causes pt to be very sob, +coughing oxygen 93% on RA, requiring multiple nebs, cards called for possible CHF exacerbation, pt admitted to medicine.

## 2023-03-25 NOTE — H&P ADULT - ASSESSMENT
A/P     CHF exacerbation : ac on ch diastolic failure   likely diastolic   echo done : shows sever left ventricular hypertrophy and grade 3 diastolic failure   c/w lasix   c/w eliquis : not sure why pt. is on AC     Pul HTN :   -will consult pul Dr. Aiken   c/w diuresis     Viral PNA:  - hMVP pos   -symptomatic/ supportive rx     DM-1  -c/w lantus / FSBS  check a1c     HTN :   c/w home meds   controlled     CKD stage 3   -monitor renal fx   consult renal Dr. Guerrero     advance care planning : d/w patient regarding advance directive. d/w him regarding intubation / CPR if need arise. agrees for everything. Remain full code.   time spend 20 min

## 2023-03-25 NOTE — ED CDU PROVIDER SUBSEQUENT DAY NOTE - HISTORY
see paper chart see paper chart  Namrata Chang PA-C: I am entering information to back log for downtime. Please see paper chart for isolation and medical management details, as the mandatory fields in the disposition section may not be accurate.

## 2023-03-25 NOTE — H&P ADULT - NSHPPHYSICALEXAM_GEN_ALL_CORE
Vital Signs Last 24 Hrs  T(C): 36.9 (25 Mar 2023 18:30), Max: 36.9 (25 Mar 2023 18:30)  T(F): 98.4 (25 Mar 2023 18:30), Max: 98.4 (25 Mar 2023 18:30)  HR: 69 (25 Mar 2023 18:30) (69 - 69)  BP: 163/95 (25 Mar 2023 18:30) (163/95 - 163/95)  BP(mean): --  RR: 18 (25 Mar 2023 18:30) (18 - 18)  SpO2: 96% (25 Mar 2023 18:30) (96% - 96%)    Parameters below as of 25 Mar 2023 18:30  Patient On (Oxygen Delivery Method): room air Vital Signs Last 24 Hrs  T(C): 36.9 (25 Mar 2023 18:30), Max: 36.9 (25 Mar 2023 18:30)  T(F): 98.4 (25 Mar 2023 18:30), Max: 98.4 (25 Mar 2023 18:30)  HR: 69 (25 Mar 2023 18:30) (69 - 69)  BP: 163/95 (25 Mar 2023 18:30) (163/95 - 163/95)  BP(mean): --  RR: 18 (25 Mar 2023 18:30) (18 - 18)  SpO2: 96% (25 Mar 2023 18:30) (96% - 96%)    Parameters below as of 25 Mar 2023 18:30  Patient On (Oxygen Delivery Method): room air    pt. seen and examined     heent : nc/at , no pallor   neck : supple, no JVD  lungs : basilar crackles , no w/r/  heart: s1s2 nml  abd : soft, NABS, NT/ND  ext : no e/c/c, pulses 2 +  neuro: aaox3 , no focal deficit

## 2023-03-25 NOTE — ED CDU PROVIDER DISPOSITION NOTE - CLINICAL COURSE
71 y/o  with HTN, HLD, DM2, and Congestive heart failure here with cough for 5 days and new onset SOB. Patient reports he has a wet cough for last 5 days and overnight last evening had worsening shortness of breath. He reports being compliant with home medications, taking 40mg of Lasix today. He denies headache, throat pain, chest pain, dizziness, nausea, vomiting, diarrhea. Denies new lower extremity swelling.  in cdu, pt with continued sob/dyspnea, little exertion causes pt to be very sob, +coughing oxygen 93% on RA, requiring multiple nebs, cards called for possible CHF exacerbation, pt admitted to medicine

## 2023-03-25 NOTE — H&P ADULT - NSHPLABSRESULTS_GEN_ALL_CORE
14.1   5.22  )-----------( 203      ( 24 Mar 2023 18:20 )             44.4   03-25    139  |  102  |  31<H>  ----------------------------<  215<H>  4.5   |  25  |  1.91<H>    Ca    9.1      25 Mar 2023 06:20    TPro  6.8  /  Alb  3.7  /  TBili  0.4  /  DBili  x   /  AST  18  /  ALT  20  /  AlkPhos  44  03-25

## 2023-03-25 NOTE — ED CDU PROVIDER SUBSEQUENT DAY NOTE - PROGRESS NOTE DETAILS
CDU NOTE FLAVIO Chang: addendum made by attending radiologist on pt's Chest xray. *** ADDENDUM # 1 ***    There is a chronic, linear-type peribronchial calcification in the right   middle lobe region. It measures 2.0cm in length and 2mm in thickness.   This is a benign-type calcifications. It is stable and unchanged since   11/30/22 exam.  No rounded subcentimeter right midlung opacities are seen.  This represents a change from the original interpretation and was   electronically sent to ER by Dr. Alford abut 8:48AM pm 3/25/23.  --- End of Report ---    *** END OF ADDENDUM # 1 ***  reviewed with patient, pt understands and will follow up.

## 2023-03-26 LAB
ANION GAP SERPL CALC-SCNC: 12 MMOL/L — SIGNIFICANT CHANGE UP (ref 5–17)
BUN SERPL-MCNC: 34 MG/DL — HIGH (ref 7–23)
CALCIUM SERPL-MCNC: 9.3 MG/DL — SIGNIFICANT CHANGE UP (ref 8.4–10.5)
CHLORIDE SERPL-SCNC: 105 MMOL/L — SIGNIFICANT CHANGE UP (ref 96–108)
CO2 SERPL-SCNC: 24 MMOL/L — SIGNIFICANT CHANGE UP (ref 22–31)
CREAT SERPL-MCNC: 1.73 MG/DL — HIGH (ref 0.5–1.3)
EGFR: 42 ML/MIN/1.73M2 — LOW
GLUCOSE BLDC GLUCOMTR-MCNC: 167 MG/DL — HIGH (ref 70–99)
GLUCOSE BLDC GLUCOMTR-MCNC: 176 MG/DL — HIGH (ref 70–99)
GLUCOSE BLDC GLUCOMTR-MCNC: 219 MG/DL — HIGH (ref 70–99)
GLUCOSE BLDC GLUCOMTR-MCNC: 240 MG/DL — HIGH (ref 70–99)
GLUCOSE SERPL-MCNC: 190 MG/DL — HIGH (ref 70–99)
HCT VFR BLD CALC: 40.7 % — SIGNIFICANT CHANGE UP (ref 39–50)
HGB BLD-MCNC: 13 G/DL — SIGNIFICANT CHANGE UP (ref 13–17)
MAGNESIUM SERPL-MCNC: 2.1 MG/DL — SIGNIFICANT CHANGE UP (ref 1.6–2.6)
MCHC RBC-ENTMCNC: 28.9 PG — SIGNIFICANT CHANGE UP (ref 27–34)
MCHC RBC-ENTMCNC: 31.9 GM/DL — LOW (ref 32–36)
MCV RBC AUTO: 90.4 FL — SIGNIFICANT CHANGE UP (ref 80–100)
NRBC # BLD: 0 /100 WBCS — SIGNIFICANT CHANGE UP (ref 0–0)
PLATELET # BLD AUTO: 177 K/UL — SIGNIFICANT CHANGE UP (ref 150–400)
POTASSIUM SERPL-MCNC: 4.5 MMOL/L — SIGNIFICANT CHANGE UP (ref 3.5–5.3)
POTASSIUM SERPL-SCNC: 4.5 MMOL/L — SIGNIFICANT CHANGE UP (ref 3.5–5.3)
RBC # BLD: 4.5 M/UL — SIGNIFICANT CHANGE UP (ref 4.2–5.8)
RBC # FLD: 13.3 % — SIGNIFICANT CHANGE UP (ref 10.3–14.5)
SODIUM SERPL-SCNC: 141 MMOL/L — SIGNIFICANT CHANGE UP (ref 135–145)
WBC # BLD: 3.68 K/UL — LOW (ref 3.8–10.5)
WBC # FLD AUTO: 3.68 K/UL — LOW (ref 3.8–10.5)

## 2023-03-26 RX ORDER — DEXTROSE 50 % IN WATER 50 %
25 SYRINGE (ML) INTRAVENOUS ONCE
Refills: 0 | Status: DISCONTINUED | OUTPATIENT
Start: 2023-03-26 | End: 2023-03-29

## 2023-03-26 RX ORDER — GLUCAGON INJECTION, SOLUTION 0.5 MG/.1ML
1 INJECTION, SOLUTION SUBCUTANEOUS ONCE
Refills: 0 | Status: DISCONTINUED | OUTPATIENT
Start: 2023-03-26 | End: 2023-03-29

## 2023-03-26 RX ORDER — APIXABAN 2.5 MG/1
5 TABLET, FILM COATED ORAL
Refills: 0 | Status: DISCONTINUED | OUTPATIENT
Start: 2023-03-26 | End: 2023-03-29

## 2023-03-26 RX ORDER — SODIUM CHLORIDE 9 MG/ML
1000 INJECTION, SOLUTION INTRAVENOUS
Refills: 0 | Status: DISCONTINUED | OUTPATIENT
Start: 2023-03-26 | End: 2023-03-29

## 2023-03-26 RX ORDER — IPRATROPIUM/ALBUTEROL SULFATE 18-103MCG
3 AEROSOL WITH ADAPTER (GRAM) INHALATION EVERY 6 HOURS
Refills: 0 | Status: DISCONTINUED | OUTPATIENT
Start: 2023-03-26 | End: 2023-03-29

## 2023-03-26 RX ORDER — DEXTROSE 50 % IN WATER 50 %
12.5 SYRINGE (ML) INTRAVENOUS ONCE
Refills: 0 | Status: DISCONTINUED | OUTPATIENT
Start: 2023-03-26 | End: 2023-03-29

## 2023-03-26 RX ORDER — DEXTROSE 50 % IN WATER 50 %
15 SYRINGE (ML) INTRAVENOUS ONCE
Refills: 0 | Status: DISCONTINUED | OUTPATIENT
Start: 2023-03-26 | End: 2023-03-29

## 2023-03-26 RX ORDER — INSULIN LISPRO 100/ML
5 VIAL (ML) SUBCUTANEOUS
Refills: 0 | Status: DISCONTINUED | OUTPATIENT
Start: 2023-03-26 | End: 2023-03-27

## 2023-03-26 RX ORDER — INSULIN LISPRO 100/ML
VIAL (ML) SUBCUTANEOUS
Refills: 0 | Status: DISCONTINUED | OUTPATIENT
Start: 2023-03-26 | End: 2023-03-29

## 2023-03-26 RX ORDER — INSULIN GLARGINE 100 [IU]/ML
20 INJECTION, SOLUTION SUBCUTANEOUS AT BEDTIME
Refills: 0 | Status: DISCONTINUED | OUTPATIENT
Start: 2023-03-26 | End: 2023-03-29

## 2023-03-26 RX ORDER — ATORVASTATIN CALCIUM 80 MG/1
80 TABLET, FILM COATED ORAL AT BEDTIME
Refills: 0 | Status: DISCONTINUED | OUTPATIENT
Start: 2023-03-26 | End: 2023-03-29

## 2023-03-26 RX ORDER — PANTOPRAZOLE SODIUM 20 MG/1
40 TABLET, DELAYED RELEASE ORAL
Refills: 0 | Status: DISCONTINUED | OUTPATIENT
Start: 2023-03-26 | End: 2023-03-29

## 2023-03-26 RX ORDER — METOPROLOL TARTRATE 50 MG
100 TABLET ORAL DAILY
Refills: 0 | Status: DISCONTINUED | OUTPATIENT
Start: 2023-03-26 | End: 2023-03-29

## 2023-03-26 RX ORDER — FUROSEMIDE 40 MG
40 TABLET ORAL
Refills: 0 | Status: DISCONTINUED | OUTPATIENT
Start: 2023-03-26 | End: 2023-03-29

## 2023-03-26 RX ADMIN — Medication 100 MILLIGRAM(S): at 06:18

## 2023-03-26 RX ADMIN — Medication 2: at 08:54

## 2023-03-26 RX ADMIN — Medication 40 MILLIGRAM(S): at 14:21

## 2023-03-26 RX ADMIN — APIXABAN 5 MILLIGRAM(S): 2.5 TABLET, FILM COATED ORAL at 17:05

## 2023-03-26 RX ADMIN — Medication 4: at 12:07

## 2023-03-26 RX ADMIN — Medication 5 UNIT(S): at 12:08

## 2023-03-26 RX ADMIN — Medication 5 UNIT(S): at 17:05

## 2023-03-26 RX ADMIN — INSULIN GLARGINE 20 UNIT(S): 100 INJECTION, SOLUTION SUBCUTANEOUS at 22:39

## 2023-03-26 RX ADMIN — Medication 40 MILLIGRAM(S): at 06:18

## 2023-03-26 RX ADMIN — APIXABAN 5 MILLIGRAM(S): 2.5 TABLET, FILM COATED ORAL at 06:18

## 2023-03-26 RX ADMIN — Medication 5 UNIT(S): at 08:54

## 2023-03-26 RX ADMIN — ATORVASTATIN CALCIUM 80 MILLIGRAM(S): 80 TABLET, FILM COATED ORAL at 22:10

## 2023-03-26 RX ADMIN — Medication 2: at 17:05

## 2023-03-26 RX ADMIN — Medication 20 MILLIGRAM(S): at 22:10

## 2023-03-26 RX ADMIN — PANTOPRAZOLE SODIUM 40 MILLIGRAM(S): 20 TABLET, DELAYED RELEASE ORAL at 06:18

## 2023-03-26 NOTE — CONSULT NOTE ADULT - SUBJECTIVE AND OBJECTIVE BOX
03-26-23 @ 14:08    Patient is a 72y old  Male who presents with a chief complaint of SOB (26 Mar 2023 10:27)      HPI:  73 y/o  with HTN, HLD, DM2, and Congestive heart failure here with cough for 5 days and new onset SOB. Patient reports he has a wet cough for last 5 days and overnight last evening had worsening shortness of breath. He reports being compliant with home medications, taking 40mg of Lasix today. He denies headache, throat pain, chest pain, dizziness, nausea, vomiting, diarrhea. Denies new lower extremity swelling.  in cdu, pt with continued sob/dyspnea, little exertion causes pt to be very sob, +coughing oxygen 93% on RA, requiring multiple nebs, cards called for possible CHF exacerbation, pt admitted to medicine. (25 Mar 2023 15:57)     he says he has no underlying lung disease:  + sob: +cough: : he has viral illness:  he has metapneumovirus infection:     ?FOLLOWING PRESENT  [ x] Hx of PE/DVT, [ x] Hx COPD, [x ] Hx of Asthma, [ x] Hx of Hospitalization, [ x]  Hx of BiPAP/CPAP use, [x ] Hx of TRISHA    Allergies    No Known Allergies    Intolerances        PAST MEDICAL & SURGICAL HISTORY:  Diabetes insipidus      Hypertension      Diabetes      No significant past surgical history          FAMILY HISTORY:  FHx: myocardial infarction (Mother, Sibling)        Social History: [xex cig:  cigar smking] TOBACCO                  [  x] ETOH                                 [ x ] IVDA/DRUGS    REVIEW OF SYSTEMS      General:	x    Skin/Breast:x  	  Ophthalmologic:x  	  ENMT:	x    Respiratory and Thorax:  sob,  cummins : wheezing  	  Cardiovascular:	x    Gastrointestinal:	x    Genitourinary:	x    Musculoskeletal:	x    Neurological:	x    Psychiatric:	x    Hematology/Lymphatics:	x    Endocrine:	x    Allergic/Immunologic:x	    MEDICATIONS  (STANDING):  apixaban 5 milliGRAM(s) Oral two times a day  atorvastatin 80 milliGRAM(s) Oral at bedtime  dextrose 5%. 1000 milliLiter(s) (100 mL/Hr) IV Continuous <Continuous>  dextrose 5%. 1000 milliLiter(s) (50 mL/Hr) IV Continuous <Continuous>  dextrose 50% Injectable 25 Gram(s) IV Push once  dextrose 50% Injectable 12.5 Gram(s) IV Push once  dextrose 50% Injectable 25 Gram(s) IV Push once  furosemide   Injectable 40 milliGRAM(s) IV Push two times a day  glucagon  Injectable 1 milliGRAM(s) IntraMuscular once  insulin glargine Injectable (LANTUS) 20 Unit(s) SubCutaneous at bedtime  insulin lispro (ADMELOG) corrective regimen sliding scale   SubCutaneous three times a day before meals  insulin lispro Injectable (ADMELOG) 5 Unit(s) SubCutaneous before breakfast  insulin lispro Injectable (ADMELOG) 5 Unit(s) SubCutaneous before lunch  insulin lispro Injectable (ADMELOG) 5 Unit(s) SubCutaneous before dinner  metoprolol succinate  milliGRAM(s) Oral daily  pantoprazole    Tablet 40 milliGRAM(s) Oral before breakfast    MEDICATIONS  (PRN):  albuterol/ipratropium for Nebulization 3 milliLiter(s) Nebulizer every 6 hours PRN Shortness of Breath and/or Wheezing  dextrose Oral Gel 15 Gram(s) Oral once PRN Blood Glucose LESS THAN 70 milliGRAM(s)/deciliter  guaiFENesin Oral Liquid (Sugar-Free) 200 milliGRAM(s) Oral every 6 hours PRN Cough       Vital Signs Last 24 Hrs  T(C): 36.8 (26 Mar 2023 11:40), Max: 36.9 (25 Mar 2023 18:30)  T(F): 98.3 (26 Mar 2023 11:40), Max: 98.4 (25 Mar 2023 18:30)  HR: 69 (26 Mar 2023 11:40) (63 - 69)  BP: 153/93 (26 Mar 2023 11:40) (153/93 - 163/95)  BP(mean): --  RR: 18 (26 Mar 2023 11:40) (18 - 19)  SpO2: 95% (26 Mar 2023 11:40) (95% - 96%)    Parameters below as of 26 Mar 2023 11:40  Patient On (Oxygen Delivery Method): room air    Orthostatic VS          I&O's Summary    25 Mar 2023 07:01  -  26 Mar 2023 07:00  --------------------------------------------------------  IN: 360 mL / OUT: 0 mL / NET: 360 mL    26 Mar 2023 07:01  -  26 Mar 2023 14:08  --------------------------------------------------------  IN: 720 mL / OUT: 400 mL / NET: 320 mL        Physical Exam:   GENERAL: NAD, well-groomed, well-developed  HEENT: TIERA/   Atraumatic, Normocephalic  ENMT: No tonsillar erythema, exudates, or enlargement; Moist mucous membranes, Good dentition, No lesions  NECK: Supple, No JVD, Normal thyroid  CHEST/LUNG: wheezing+   CVS: Regular rate and rhythm; No murmurs, rubs, or gallops  GI: : Soft, Nontender, Nondistended; Bowel sounds present  NERVOUS SYSTEM:  Alert & Oriented X3  EXTREMITIES:  -edema  LYMPH: No lymphadenopathy noted  SKIN: No rashes or lesions  ENDOCRINOLOGY: No Thyromegaly  PSYCH: Appropriate    Labs:  SARS-CoV-2: NotDetec (24 Mar 2023 21:53)  COVID-19 PCR: NotDetec (08 Dec 2022 06:08)  COVID-19 PCR: NotDetec (04 Dec 2022 06:55)                              13.0   3.68  )-----------( 177      ( 26 Mar 2023 06:01 )             40.7                         14.1   5.22  )-----------( 203      ( 24 Mar 2023 18:20 )             44.4     03-26    141  |  105  |  34<H>  ----------------------------<  190<H>  4.5   |  24  |  1.73<H>  03-25    139  |  102  |  31<H>  ----------------------------<  215<H>  4.5   |  25  |  1.91<H>  03-24    140  |  102  |  28<H>  ----------------------------<  175<H>  4.3   |  26  |  1.84<H>    Ca    9.3      26 Mar 2023 06:01  Ca    9.1      25 Mar 2023 06:20  Ca    9.6      24 Mar 2023 18:20  Mg     2.1     03-26    TPro  6.8  /  Alb  3.7  /  TBili  0.4  /  DBili  x   /  AST  18  /  ALT  20  /  AlkPhos  44  03-25  TPro  7.8  /  Alb  4.3  /  TBili  0.4  /  DBili  x   /  AST  25  /  ALT  25  /  AlkPhos  53  03-24    CAPILLARY BLOOD GLUCOSE      POCT Blood Glucose.: 240 mg/dL (26 Mar 2023 11:58)  POCT Blood Glucose.: 176 mg/dL (26 Mar 2023 08:53)  POCT Blood Glucose.: 138 mg/dL (25 Mar 2023 18:28)    LIVER FUNCTIONS - ( 25 Mar 2023 06:20 )  Alb: 3.7 g/dL / Pro: 6.8 g/dL / ALK PHOS: 44 U/L / ALT: 20 U/L / AST: 18 U/L / GGT: x               D DImer      Studies  Chest X-RAY  CT SCAN Chest   CT Abdomen  Venous Dopplers: LE:   Others        rad< from: Xray Chest 2 Views PA/Lat (03.24.23 @ 17:43) >    ACC: [id number] EXAM:  XR CHEST PA LAT 2V   ORDERED BY: ANAY MIRAMONTES     *** ADDENDUM # 1 ***    There is a chronic, linear-type peribronchial calcification in the right   middle lobe region. It measures 2.0cm in length and 2mm in thickness.   This is a benign-type calcifications. It is stable and unchanged since   11/30/22 exam.  No rounded subcentimeter right midlung opacities are seen.  This represents a change from the original interpretation and was   electronically sent to ER by Dr. Alford abut 8:48AM pm 3/25/23.  --- End of Report ---    < end of copied text >  < from: Xray Chest 2 Views PA/Lat (03.24.23 @ 17:43) >    ACC: [id number] EXAM:  XR CHEST PA LAT 2V   ORDERED BY: ANAY MIRAMONTES     *** ADDENDUM # 1 ***    There is a chronic, linear-type peribronchial calcification in the right   middle lobe region. It measures 2.0cm in length and 2mm in thickness.   This is a benign-type calcifications. It is stable and unchanged since   11/30/22 exam.  No rounded subcentimeter right midlung opacities are seen.  This represents a change from the original interpretation and was   electronically sent to ER by Dr. Alford abut 8:48AM pm 3/25/23.  --- End of Report ---    < end of copied text >  < from: TTE W or WO Ultrasound Enhancing Agent (03.25.23 @ 14:04) >  Study Information: Image quality for this study is good.    ________________________________________________________________________________________  CONCLUSIONS:      1. Normal left ventricular cavity size. The left ventricular wall thickness is moderately increased. The left ventricular systolic function is normal with an ejection fraction of 63 % There are no regional wall motion abnormalities seen.   2. Global longitudinal strain is -13.0 % (abnormal > -16%). GLS was assessed on [facility] [employer] echo machine with a heart rate of 72 bpm and a blood pressure of 146/92 mmHg.   3. There is severe (grade 3) left ventricular diastolic dysfunction, with elevated left atrial filling pressure.   4. Normal right ventricular cavity size and normal wall thickness The tricuspid annular plane systolic excursion (TAPSE) is 1.8 cm (normal >=1.7 cm).   5. Severe pulmonary hypertension.   6. No pericardial effusion seen.    __________________________________________________    < end of copied text >  < from: TTE W or WO Ultrasound Enhancing Agent (03.25.23 @ 14:04) >  Study Information: Image quality for this study is good.    ________________________________________________________________________________________  CONCLUSIONS:      1. Normal left ventricular cavity size. The left ventricular wall thickness is moderately increased. The left ventricular systolic function is normal with an ejection fraction of 63 % There are no regional wall motion abnormalities seen.   2. Global longitudinal strain is -13.0 % (abnormal > -16%). GLS was assessed on [facility] [employer] [facility] [employer] [facility] [employer] [employer] echo machine with a heart rate of 72 bpm and a blood pressure of 146/92 mmHg.   3. There is severe (grade 3) left ventricular diastolic dysfunction, with elevated left atrial filling pressure.   4. Normal right ventricular cavity size and normal wall thickness The tricuspid annular plane systolic excursion (TAPSE) is 1.8 cm (normal >=1.7 cm).   5. Severe pulmonary hypertension.   6. No pericardial effusion seen.    __________________________________________________    < end of copied text >

## 2023-03-26 NOTE — CONSULT NOTE ADULT - SUBJECTIVE AND OBJECTIVE BOX
[facility]   MD YAKELIN MCCLURE MD RUORU WONG, PA    TEL:  FROM 9 AM to 5 PM--OFFICE: [phone]    FROM 5 PM- 9 AM PLEASE CALL ANSWERING SERVICE AT [phone]    -- INITIAL RENAL CONSULT NOTE --- Date Of service 03-26-23 @ 10:28  --------------------------------------------------------------------------------  HPI:  71 y/o  with HTN, HLD, DM2, and Congestive heart failure here with cough for 5 days and new onset SOB. Patient reports he has a wet cough for last 5 days and overnight last evening had worsening shortness of breath. He reports being compliant with home medications, taking 40mg of Lasix today. He denies headache, throat pain, chest pain, dizziness, nausea, vomiting, diarrhea. Denies new lower extremity swelling.  in cdu, pt with continued sob/dyspnea, little exertion causes pt to be very sob, +coughing oxygen 93% on RA, requiring multiple nebs, cards called for possible CHF exacerbation, pt admitted to medicine. (25 Mar 2023 15:57)        PAST HISTORY  --------------------------------------------------------------------------------  PAST MEDICAL & SURGICAL HISTORY:  Diabetes insipidus      Hypertension      Diabetes      No significant past surgical history        FAMILY HISTORY:  FHx: myocardial infarction (Mother, Sibling)      PAST SOCIAL HISTORY:    ALLERGIES & MEDICATIONS  --------------------------------------------------------------------------------  Allergies    No Known Allergies    Intolerances      Standing Inpatient Medications  apixaban 5 milliGRAM(s) Oral two times a day  atorvastatin 80 milliGRAM(s) Oral at bedtime  dextrose 5%. 1000 milliLiter(s) IV Continuous <Continuous>  dextrose 5%. 1000 milliLiter(s) IV Continuous <Continuous>  dextrose 50% Injectable 25 Gram(s) IV Push once  dextrose 50% Injectable 12.5 Gram(s) IV Push once  dextrose 50% Injectable 25 Gram(s) IV Push once  furosemide   Injectable 40 milliGRAM(s) IV Push two times a day  glucagon  Injectable 1 milliGRAM(s) IntraMuscular once  insulin glargine Injectable (LANTUS) 20 Unit(s) SubCutaneous at bedtime  insulin lispro (ADMELOG) corrective regimen sliding scale   SubCutaneous three times a day before meals  insulin lispro Injectable (ADMELOG) 5 Unit(s) SubCutaneous before breakfast  insulin lispro Injectable (ADMELOG) 5 Unit(s) SubCutaneous before lunch  insulin lispro Injectable (ADMELOG) 5 Unit(s) SubCutaneous before dinner  metoprolol succinate  milliGRAM(s) Oral daily  pantoprazole    Tablet 40 milliGRAM(s) Oral before breakfast    PRN Inpatient Medications  albuterol/ipratropium for Nebulization 3 milliLiter(s) Nebulizer every 6 hours PRN  dextrose Oral Gel 15 Gram(s) Oral once PRN  guaiFENesin Oral Liquid (Sugar-Free) 200 milliGRAM(s) Oral every 6 hours PRN      REVIEW OF SYSTEMS  --------------------------------------------------------------------------------  Gen: No fevers/chills  Skin: No rashes  Head/Eyes/Ears: Normal hearing,  Normal vision   Respiratory: No dyspnea, cough  CV: No chest pain  GI: No abdominal pain, diarrhea, constipation, nausea, vomiting  : No dysuria, hematuria  MSK: No  edema  Heme: No easy bruising or bleeding  Psych: No significant depression    All other systems were reviewed and are negative, except as noted.    VITALS/PHYSICAL EXAM  --------------------------------------------------------------------------------  T(C): 36.9 (03-26-23 @ 03:45), Max: 36.9 (03-25-23 @ 18:30)  HR: 63 (03-26-23 @ 03:45) (63 - 69)  BP: 155/90 (03-26-23 @ 03:45) (155/90 - 163/95)  RR: 19 (03-26-23 @ 03:45) (18 - 19)  SpO2: 96% (03-26-23 @ 03:45) (96% - 96%)  Wt(kg): --  Height (cm): 11 (03-24-23 @ 14:20)  Weight (kg): 107 (03-24-23 @ 14:20)  BMI (kg/m2): 8843 (03-24-23 @ 14:20)  BSA (m2): 0.3 (03-24-23 @ 14:20)      03-25-23 @ 07:01  -  03-26-23 @ 07:00  --------------------------------------------------------  IN: 360 mL / OUT: 0 mL / NET: 360 mL      Physical Exam:  	Gen: NAD  	HEENT: MMM  	Pulm: CTA B/L  	CV: S1S2  	Abd: Soft, +BS   	Ext: No LE edema B/L  	Neuro: Awake, alert  	Skin: Warm and dry  	Vascular access: No HD catheter           WING: cheek  LABS/STUDIES  --------------------------------------------------------------------------------              13.0   3.68  >-----------<  177      [03-26-23 @ 06:01]              40.7     141  |  105  |  34  ----------------------------<  190      [03-26-23 @ 06:01]  4.5   |  24  |  1.73        Ca     9.3     [03-26-23 @ 06:01]      Mg     2.1     [03-26-23 @ 06:01]    TPro  6.8  /  Alb  3.7  /  TBili  0.4  /  DBili  x   /  AST  18  /  ALT  20  /  AlkPhos  44  [03-25-23 @ 06:20]          Creatinine Trend:  SCr 1.73 [03-26 @ 06:01]  SCr 1.91 [03-25 @ 06:20]  SCr 1.84 [03-24 @ 18:20]    Urinalysis - [12-05-22 @ 16:18]      Color Yellow / Appearance Clear / SG 1.021 / pH 6.0      Gluc Negative / Ketone Negative  / Bili Negative / Urobili Negative       Blood Large / Protein 100 / Leuk Est Negative / Nitrite Negative       / WBC 1 / Hyaline 1 / Gran  / Sq Epi  / Non Sq Epi 0 / Bacteria Negative      TSH 2.19      [12-06-22 @ 06:21]  Lipid: chol 153, , HDL 36, LDL --      [12-04-22 @ 05:51]    HBsAg Nonreact      [12-07-22 @ 09:44]  HCV 0.06, Nonreact      [12-07-22 @ 09:44]    DANDY: titer Negative, pattern --      [12-07-22 @ 09:44]  dsDNA <12      [12-07-22 @ 09:44]  C3 Complement 158      [12-07-22 @ 09:44]  C4 Complement 58      [12-07-22 @ 09:44]  ANCA: cANCA Negative, pANCA Negative, atypical ANCA Negative      [12-07-22 @ 09:44]  anti-GBM <0.2      [12-07-22 @ 09:44]  Free Light Chains: kappa 4.11, lambda 2.75, ratio = 1.49      [12-04 @ 05:51]   [facility]   MD YAKELIN MCCLURE MD RUORU WONG, PA    TEL:  FROM 9 AM to 5 PM--OFFICE: [phone]    FROM 5 PM- 9 AM PLEASE CALL ANSWERING SERVICE AT [phone]    -- INITIAL RENAL CONSULT NOTE --- Date Of service 03-26-23 @ 10:28  --------------------------------------------------------------------------------  HPI:  71 y/o  with HTN, HLD, DM2, and Congestive heart failure here with cough for 5 days and new onset SOB. Patient reports he has a wet cough for last 5 days and overnight last evening had worsening shortness of breath. He reports being compliant with home medications, taking 40mg of Lasix today. He denies headache, throat pain, chest pain, dizziness, nausea, vomiting, diarrhea. Denies new lower extremity swelling.  in cdu, pt with continued sob/dyspnea, little exertion causes pt to be very sob, +coughing oxygen 93% on RA, requiring multiple nebs, cards called for possible CHF exacerbation, pt admitted to medicine. (25 Mar 2023 15:57)        PAST HISTORY  --------------------------------------------------------------------------------  PAST MEDICAL & SURGICAL HISTORY:  Diabetes insipidus      Hypertension      Diabetes      No significant past surgical history        FAMILY HISTORY:  FHx: myocardial infarction (Mother, Sibling)      PAST SOCIAL HISTORY:    ALLERGIES & MEDICATIONS  --------------------------------------------------------------------------------  Allergies    No Known Allergies    Intolerances      Standing Inpatient Medications  apixaban 5 milliGRAM(s) Oral two times a day  atorvastatin 80 milliGRAM(s) Oral at bedtime  dextrose 5%. 1000 milliLiter(s) IV Continuous <Continuous>  dextrose 5%. 1000 milliLiter(s) IV Continuous <Continuous>  dextrose 50% Injectable 25 Gram(s) IV Push once  dextrose 50% Injectable 12.5 Gram(s) IV Push once  dextrose 50% Injectable 25 Gram(s) IV Push once  furosemide   Injectable 40 milliGRAM(s) IV Push two times a day  glucagon  Injectable 1 milliGRAM(s) IntraMuscular once  insulin glargine Injectable (LANTUS) 20 Unit(s) SubCutaneous at bedtime  insulin lispro (ADMELOG) corrective regimen sliding scale   SubCutaneous three times a day before meals  insulin lispro Injectable (ADMELOG) 5 Unit(s) SubCutaneous before breakfast  insulin lispro Injectable (ADMELOG) 5 Unit(s) SubCutaneous before lunch  insulin lispro Injectable (ADMELOG) 5 Unit(s) SubCutaneous before dinner  metoprolol succinate  milliGRAM(s) Oral daily  pantoprazole    Tablet 40 milliGRAM(s) Oral before breakfast    PRN Inpatient Medications  albuterol/ipratropium for Nebulization 3 milliLiter(s) Nebulizer every 6 hours PRN  dextrose Oral Gel 15 Gram(s) Oral once PRN  guaiFENesin Oral Liquid (Sugar-Free) 200 milliGRAM(s) Oral every 6 hours PRN      REVIEW OF SYSTEMS  --------------------------------------------------------------------------------  Gen: No fevers/chills  Skin: No rashes  Head/Eyes/Ears: Normal hearing,  Normal vision   Respiratory: + dyspnea, cough  CV: No chest pain  GI: No abdominal pain, diarrhea, constipation, nausea, vomiting  : No dysuria, hematuria  MSK: No  edema  Heme: No easy bruising or bleeding  Psych: No significant depression    All other systems were reviewed and are negative, except as noted.    VITALS/PHYSICAL EXAM  --------------------------------------------------------------------------------  T(C): 36.9 (03-26-23 @ 03:45), Max: 36.9 (03-25-23 @ 18:30)  HR: 63 (03-26-23 @ 03:45) (63 - 69)  BP: 155/90 (03-26-23 @ 03:45) (155/90 - 163/95)  RR: 19 (03-26-23 @ 03:45) (18 - 19)  SpO2: 96% (03-26-23 @ 03:45) (96% - 96%)  Wt(kg): --  Height (cm): 11 (03-24-23 @ 14:20)  Weight (kg): 107 (03-24-23 @ 14:20)  BMI (kg/m2): 8843 (03-24-23 @ 14:20)  BSA (m2): 0.3 (03-24-23 @ 14:20)      03-25-23 @ 07:01  -  03-26-23 @ 07:00  --------------------------------------------------------  IN: 360 mL / OUT: 0 mL / NET: 360 mL      Physical Exam:  	Gen: NAD  	HEENT: MMM  	Pulm: CTA B/L  	CV: S1S2  	Abd: Soft, +BS   	Ext: No LE edema B/L  	Neuro: Awake, alert  	Skin: Warm and dry  	Vascular access: No HD catheter           : no  ham  LABS/STUDIES  --------------------------------------------------------------------------------              13.0   3.68  >-----------<  177      [03-26-23 @ 06:01]              40.7     141  |  105  |  34  ----------------------------<  190      [03-26-23 @ 06:01]  4.5   |  24  |  1.73        Ca     9.3     [03-26-23 @ 06:01]      Mg     2.1     [03-26-23 @ 06:01]    TPro  6.8  /  Alb  3.7  /  TBili  0.4  /  DBili  x   /  AST  18  /  ALT  20  /  AlkPhos  44  [03-25-23 @ 06:20]          Creatinine Trend:  SCr 1.73 [03-26 @ 06:01]  SCr 1.91 [03-25 @ 06:20]  SCr 1.84 [03-24 @ 18:20]    Urinalysis - [12-05-22 @ 16:18]      Color Yellow / Appearance Clear / SG 1.021 / pH 6.0      Gluc Negative / Ketone Negative  / Bili Negative / Urobili Negative       Blood Large / Protein 100 / Leuk Est Negative / Nitrite Negative       / WBC 1 / Hyaline 1 / Gran  / Sq Epi  / Non Sq Epi 0 / Bacteria Negative      TSH 2.19      [12-06-22 @ 06:21]  Lipid: chol 153, , HDL 36, LDL --      [12-04-22 @ 05:51]    HBsAg Nonreact      [12-07-22 @ 09:44]  HCV 0.06, Nonreact      [12-07-22 @ 09:44]    DANDY: titer Negative, pattern --      [12-07-22 @ 09:44]  dsDNA <12      [12-07-22 @ 09:44]  C3 Complement 158      [12-07-22 @ 09:44]  C4 Complement 58      [12-07-22 @ 09:44]  ANCA: cANCA Negative, pANCA Negative, atypical ANCA Negative      [12-07-22 @ 09:44]  anti-GBM <0.2      [12-07-22 @ 09:44]  Free Light Chains: kappa 4.11, lambda 2.75, ratio = 1.49      [12-04 @ 05:51]

## 2023-03-26 NOTE — CONSULT NOTE ADULT - ASSESSMENT
A/P : 71 y/o  with HTN, HLD, DM2, and Congestive heart failure here with cough for 5 days and new onset SOB. Patient reports he has a wet cough for last 5 days and overnight last evening had worsening shortness of breath. He reports being compliant with home medications, taking 40mg of Lasix today. He denies headache, throat pain, chest pain, dizziness, nausea, vomiting, diarrhea. Denies new lower extremity swelling.  in cdu, pt with continued sob/dyspnea, little exertion causes pt to be very sob, +coughing oxygen 93% on RA, requiring multiple nebs, cards called for possible CHF exacerbation, pt admitted to medicine. (25 Mar 2023 15:57)     he says he has no underlying lung disease:  + sob: +cough: : he has viral illness:  he has metapneumovirus infection:     CHF exacerbation : ac on ch diastolic failure : echo done : shows sever left ventricular hypertrophy and grade 3 diastolic failure : c/w lasix  : c/w eliquis : not sure why pt. is on AC     Pul HTN :  pa pressure is 73 mm hg : has grade 3 diastolic dysfunction : ? has underlying sleep apnea:      Viral PNA: start steroids and bd:  he is wheezing a lot      DM-1 : monitor and control     HTN : controlled    CKD stage 3  : stable: monitro creat: :    dvt prophylaxis:    george ramirez       
73 y/o  with HTN, HLD, DM2, and Congestive heart failure here with cough for 5 days and new onset SOB. Patient reports he has a wet cough for last 5 days and overnight last evening had worsening shortness of breath.     CKD stage 3 B  baseline scr 1.8-2.0  renal function stable   On lasix  Check UA  Monitor at present     SOB  likely sec to CHF  Monitor daily weights  Low salt diet  COntinue lasix     HTN  BP fluctuating   MOnitor

## 2023-03-26 NOTE — PROVIDER CONTACT NOTE (OTHER) - ASSESSMENT
pt a & o x 4. denies cp or SOB. asymptomatic. VSS, see in flowsheet
Pt AXOX4, denies s/s of sob, chest pain, palpitations, VSS, in no acute distress

## 2023-03-26 NOTE — PROGRESS NOTE ADULT - ASSESSMENT
A/P     CHF exacerbation : ac on ch diastolic failure   likely diastolic   echo done : shows sever left ventricular hypertrophy and grade 3 diastolic failure   c/w lasix     Ch afib :  rate controlled , c/w eliquis     Pul HTN :   -seen by pulmonary   c/w diuresis     Viral PNA:  - hMVP pos   -symptomatic/ supportive rx     DM-1  -c/w lantus / FSBS  check a1c     HTN :   c/w home meds   controlled     CKD stage 3   -monitor renal fx   seen by renal   f/u recommendation     dispo: please call house cardiology in am , they were consulted by CDU yesterday

## 2023-03-27 LAB
A1C WITH ESTIMATED AVERAGE GLUCOSE RESULT: 10.5 % — HIGH (ref 4–5.6)
ANION GAP SERPL CALC-SCNC: 13 MMOL/L — SIGNIFICANT CHANGE UP (ref 5–17)
BUN SERPL-MCNC: 49 MG/DL — HIGH (ref 7–23)
CALCIUM SERPL-MCNC: 10.3 MG/DL — SIGNIFICANT CHANGE UP (ref 8.4–10.5)
CHLORIDE SERPL-SCNC: 102 MMOL/L — SIGNIFICANT CHANGE UP (ref 96–108)
CO2 SERPL-SCNC: 24 MMOL/L — SIGNIFICANT CHANGE UP (ref 22–31)
CREAT SERPL-MCNC: 2.05 MG/DL — HIGH (ref 0.5–1.3)
EGFR: 34 ML/MIN/1.73M2 — LOW
ESTIMATED AVERAGE GLUCOSE: 255 MG/DL — HIGH (ref 68–114)
GLUCOSE BLDC GLUCOMTR-MCNC: 292 MG/DL — HIGH (ref 70–99)
GLUCOSE BLDC GLUCOMTR-MCNC: 338 MG/DL — HIGH (ref 70–99)
GLUCOSE BLDC GLUCOMTR-MCNC: 342 MG/DL — HIGH (ref 70–99)
GLUCOSE BLDC GLUCOMTR-MCNC: 413 MG/DL — HIGH (ref 70–99)
GLUCOSE SERPL-MCNC: 294 MG/DL — HIGH (ref 70–99)
MAGNESIUM SERPL-MCNC: 2.2 MG/DL — SIGNIFICANT CHANGE UP (ref 1.6–2.6)
PHOSPHATE SERPL-MCNC: 4.9 MG/DL — HIGH (ref 2.5–4.5)
POTASSIUM SERPL-MCNC: 4.6 MMOL/L — SIGNIFICANT CHANGE UP (ref 3.5–5.3)
POTASSIUM SERPL-SCNC: 4.6 MMOL/L — SIGNIFICANT CHANGE UP (ref 3.5–5.3)
SODIUM SERPL-SCNC: 139 MMOL/L — SIGNIFICANT CHANGE UP (ref 135–145)

## 2023-03-27 RX ORDER — INSULIN LISPRO 100/ML
8 VIAL (ML) SUBCUTANEOUS
Refills: 0 | Status: DISCONTINUED | OUTPATIENT
Start: 2023-03-27 | End: 2023-03-29

## 2023-03-27 RX ORDER — INSULIN LISPRO 100/ML
3 VIAL (ML) SUBCUTANEOUS ONCE
Refills: 0 | Status: COMPLETED | OUTPATIENT
Start: 2023-03-27 | End: 2023-03-27

## 2023-03-27 RX ORDER — INSULIN LISPRO 100/ML
8 VIAL (ML) SUBCUTANEOUS
Refills: 0 | Status: DISCONTINUED | OUTPATIENT
Start: 2023-03-28 | End: 2023-03-29

## 2023-03-27 RX ADMIN — Medication 5 UNIT(S): at 08:59

## 2023-03-27 RX ADMIN — APIXABAN 5 MILLIGRAM(S): 2.5 TABLET, FILM COATED ORAL at 05:11

## 2023-03-27 RX ADMIN — APIXABAN 5 MILLIGRAM(S): 2.5 TABLET, FILM COATED ORAL at 17:23

## 2023-03-27 RX ADMIN — Medication 20 MILLIGRAM(S): at 05:11

## 2023-03-27 RX ADMIN — Medication 3 UNIT(S): at 13:42

## 2023-03-27 RX ADMIN — Medication 40 MILLIGRAM(S): at 05:12

## 2023-03-27 RX ADMIN — PANTOPRAZOLE SODIUM 40 MILLIGRAM(S): 20 TABLET, DELAYED RELEASE ORAL at 05:11

## 2023-03-27 RX ADMIN — Medication 40 MILLIGRAM(S): at 13:41

## 2023-03-27 RX ADMIN — Medication 12: at 12:45

## 2023-03-27 RX ADMIN — ATORVASTATIN CALCIUM 80 MILLIGRAM(S): 80 TABLET, FILM COATED ORAL at 21:14

## 2023-03-27 RX ADMIN — INSULIN GLARGINE 20 UNIT(S): 100 INJECTION, SOLUTION SUBCUTANEOUS at 22:15

## 2023-03-27 RX ADMIN — Medication 8 UNIT(S): at 17:23

## 2023-03-27 RX ADMIN — Medication 20 MILLIGRAM(S): at 21:14

## 2023-03-27 RX ADMIN — Medication 20 MILLIGRAM(S): at 13:42

## 2023-03-27 RX ADMIN — Medication 100 MILLIGRAM(S): at 05:11

## 2023-03-27 RX ADMIN — Medication 8: at 17:23

## 2023-03-27 RX ADMIN — Medication 6: at 08:59

## 2023-03-27 RX ADMIN — Medication 5 UNIT(S): at 12:47

## 2023-03-27 NOTE — PATIENT PROFILE ADULT - NSPROIMPLANTSMEDDEV_GEN_A_NUR
TRANSFER - OUT REPORT:    Verbal report given to Maryjane Caller (name) on Edgar Amin  being transferred to [facility] ER (unit) for ordered procedure       Report consisted of patients Situation, Background, Assessment and   Recommendations(SBAR). Information from the following report(s) SBAR was reviewed with the receiving nurse. Lines:   Peripheral IV 01/14/22 Left Antecubital (Active)   Site Assessment Clean, dry, & intact 01/14/22 0915   Phlebitis Assessment 0 01/14/22 0915   Infiltration Assessment 0 01/14/22 0915   Dressing Status Clean, dry, & intact 01/14/22 0915   Dressing Type Tape;Transparent 01/14/22 0915   Hub Color/Line Status Pink;Flushed;Patent 01/14/22 0915   Action Taken Blood drawn 01/14/22 0915        Opportunity for questions and clarification was provided. None

## 2023-03-27 NOTE — PROGRESS NOTE ADULT - ASSESSMENT
A/P     CHF exacerbation : ac on ch diastolic failure   likely diastolic   echo done : shows sever left ventricular hypertrophy and grade 3 diastolic failure   c/w lasix     Ch afib :  rate controlled , c/w eliquis     Pul HTN :   -seen by pulmonary   c/w diuresis     Viral PNA:  - hMVP pos   -symptomatic/ supportive rx     DM-1  -c/w lantus / FSBS  check a1c     HTN :   c/w home meds   controlled     CKD stage 3   -monitor renal fx   seen by renal   f/u recommendation     dispo: will consult Dr Melton cardiology in am

## 2023-03-27 NOTE — PATIENT PROFILE ADULT - FALL HARM RISK - HARM RISK INTERVENTIONS

## 2023-03-27 NOTE — PROGRESS NOTE ADULT - ASSESSMENT
A/P : 71 y/o  with HTN, HLD, DM2, and Congestive heart failure here with cough for 5 days and new onset SOB. Patient reports he has a wet cough for last 5 days and overnight last evening had worsening shortness of breath. He reports being compliant with home medications, taking 40mg of Lasix today. He denies headache, throat pain, chest pain, dizziness, nausea, vomiting, diarrhea. Denies new lower extremity swelling.  in cdu, pt with continued sob/dyspnea, little exertion causes pt to be very sob, +coughing oxygen 93% on RA, requiring multiple nebs, cards called for possible CHF exacerbation, pt admitted to medicine. (25 Mar 2023 15:57)     he says he has no underlying lung disease:  + sob: +cough: : he has viral illness:  he has metapneumovirus infection:     CHF exacerbation : ac on ch diastolic failure : echo done : shows sever left ventricular hypertrophy and grade 3 diastolic failure : c/w lasix  : c/w eliquis : not sure why pt. is on AC     Pul HTN :  pa pressure is 73 mm hg : has grade 3 diastolic dysfunction : ? has underlying sleep apnea:      Viral PNA: start steroids and bd:  he is wheezing a lot      DM-1 : monitor and control     HTN : controlled    CKD stage 3  : stable: monitro creat: :    dvt prophylaxis:    dw acp           3/37:  CHF exacerbation : ac on ch diastolic failure : echo done : shows sever left ventricular hypertrophy and grade 3 diastolic failure : c/w lasix  : c/w eliquis :     Pul HTN :  pa pressure is 73 mm hg : has grade 3 diastolic dysfunction : ? has underlying sleep apnea:      Viral PNA: cont steroids  : wheezing is better:  decrease in AM  : ct scan chest showed: There is a chronic, linear-type peribronchial calcification in the right  middle lobe region. It measures 2.0cm in length and 2mm in thickness. This is a benign-type calcifications. It is stable and unchanged since 11/30/22 exam. No rounded subcentimeter right midlung opacities are seen.    DM-1 : monitor and control     HTN : controlled    CKD stage 3  : stable: monitro creat: :    dvt prophylaxis:    dw acp

## 2023-03-27 NOTE — PROGRESS NOTE ADULT - ASSESSMENT
71 y/o  with HTN, HLD, DM2, and Congestive heart failure here with cough for 5 days and new onset SOB. Patient reports he has a wet cough for last 5 days and overnight last evening had worsening shortness of breath.     CKD stage 3 B  baseline scr 1.8-2.0  renal function increased but remains stable   On lasix  Obtain UA   Monitor at present     SOB  likely sec to CHF  Monitor daily weights  Low salt diet  Continue lasix      HTN  BP fluctuates but improving   Continue to monitor  73 y/o  with HTN, HLD, DM2, and Congestive heart failure here with cough for 5 days and new onset SOB. Patient reports he has a wet cough for last 5 days and overnight last evening had worsening shortness of breath.     CKD stage 3 B  baseline scr 1.8-2.0  sCr increased but renal function remains stable   On lasix  Obtain UA   Monitor at present     SOB  likely sec to CHF  Monitor daily weights  Low salt diet  Continue lasix      HTN  BP fluctuates but improving   Continue to monitor  73 y/o  with HTN, HLD, DM2, and Congestive heart failure here with cough for 5 days and new onset SOB. Patient reports he has a wet cough for last 5 days and overnight last evening had worsening shortness of breath.     CKD stage 3 B  baseline scr 1.8-2.0   renal function remains stable , within baseline  scr expected to rise with diuretics   On lasix  Obtain UA   Monitor at present     SOB  likely sec to CHF  Monitor daily weights  Low salt diet  Continue lasix      HTN  BP fluctuates   Continue to monitor

## 2023-03-28 LAB
ANION GAP SERPL CALC-SCNC: 15 MMOL/L — SIGNIFICANT CHANGE UP (ref 5–17)
BUN SERPL-MCNC: 63 MG/DL — HIGH (ref 7–23)
CALCIUM SERPL-MCNC: 9.4 MG/DL — SIGNIFICANT CHANGE UP (ref 8.4–10.5)
CHLORIDE SERPL-SCNC: 100 MMOL/L — SIGNIFICANT CHANGE UP (ref 96–108)
CO2 SERPL-SCNC: 22 MMOL/L — SIGNIFICANT CHANGE UP (ref 22–31)
CREAT SERPL-MCNC: 2.19 MG/DL — HIGH (ref 0.5–1.3)
EGFR: 32 ML/MIN/1.73M2 — LOW
GLUCOSE BLDC GLUCOMTR-MCNC: 204 MG/DL — HIGH (ref 70–99)
GLUCOSE BLDC GLUCOMTR-MCNC: 245 MG/DL — HIGH (ref 70–99)
GLUCOSE BLDC GLUCOMTR-MCNC: 279 MG/DL — HIGH (ref 70–99)
GLUCOSE BLDC GLUCOMTR-MCNC: 299 MG/DL — HIGH (ref 70–99)
GLUCOSE SERPL-MCNC: 250 MG/DL — HIGH (ref 70–99)
MAGNESIUM SERPL-MCNC: 2.1 MG/DL — SIGNIFICANT CHANGE UP (ref 1.6–2.6)
PHOSPHATE SERPL-MCNC: 4.3 MG/DL — SIGNIFICANT CHANGE UP (ref 2.5–4.5)
POTASSIUM SERPL-MCNC: 4.7 MMOL/L — SIGNIFICANT CHANGE UP (ref 3.5–5.3)
POTASSIUM SERPL-SCNC: 4.7 MMOL/L — SIGNIFICANT CHANGE UP (ref 3.5–5.3)
SODIUM SERPL-SCNC: 137 MMOL/L — SIGNIFICANT CHANGE UP (ref 135–145)

## 2023-03-28 RX ORDER — AMLODIPINE BESYLATE 2.5 MG/1
5 TABLET ORAL ONCE
Refills: 0 | Status: COMPLETED | OUTPATIENT
Start: 2023-03-28 | End: 2023-03-28

## 2023-03-28 RX ADMIN — Medication 100 MILLIGRAM(S): at 05:04

## 2023-03-28 RX ADMIN — Medication 4: at 17:34

## 2023-03-28 RX ADMIN — AMLODIPINE BESYLATE 5 MILLIGRAM(S): 2.5 TABLET ORAL at 11:37

## 2023-03-28 RX ADMIN — INSULIN GLARGINE 20 UNIT(S): 100 INJECTION, SOLUTION SUBCUTANEOUS at 23:06

## 2023-03-28 RX ADMIN — PANTOPRAZOLE SODIUM 40 MILLIGRAM(S): 20 TABLET, DELAYED RELEASE ORAL at 05:05

## 2023-03-28 RX ADMIN — Medication 8 UNIT(S): at 17:34

## 2023-03-28 RX ADMIN — Medication 8 UNIT(S): at 11:37

## 2023-03-28 RX ADMIN — Medication 20 MILLIGRAM(S): at 23:09

## 2023-03-28 RX ADMIN — APIXABAN 5 MILLIGRAM(S): 2.5 TABLET, FILM COATED ORAL at 17:35

## 2023-03-28 RX ADMIN — Medication 8 UNIT(S): at 08:07

## 2023-03-28 RX ADMIN — Medication 6: at 11:36

## 2023-03-28 RX ADMIN — Medication 20 MILLIGRAM(S): at 14:00

## 2023-03-28 RX ADMIN — APIXABAN 5 MILLIGRAM(S): 2.5 TABLET, FILM COATED ORAL at 05:04

## 2023-03-28 RX ADMIN — Medication 20 MILLIGRAM(S): at 05:04

## 2023-03-28 RX ADMIN — ATORVASTATIN CALCIUM 80 MILLIGRAM(S): 80 TABLET, FILM COATED ORAL at 23:07

## 2023-03-28 RX ADMIN — Medication 40 MILLIGRAM(S): at 14:00

## 2023-03-28 RX ADMIN — Medication 4: at 08:07

## 2023-03-28 RX ADMIN — Medication 40 MILLIGRAM(S): at 05:04

## 2023-03-28 NOTE — PROGRESS NOTE ADULT - ASSESSMENT
A/P     CHF exacerbation : ac on ch diastolic failure   likely diastolic   echo done : shows sever left ventricular hypertrophy and grade 3 diastolic failure   c/w lasix     Ch afib :  rate controlled , c/w eliquis     Pul HTN :   -seen by pulmonary   c/w diuresis     Viral PNA:  - hMVP pos   -symptomatic/ supportive rx     DM-1  -c/w lantus / FSBS  check a1c     HTN :   c/w home meds   controlled     CKD stage 3   -monitor renal fx   seen by renal   f/u recommendation     dispo: plan for changing to lasix PO from am , and plan for d/c

## 2023-03-28 NOTE — PROGRESS NOTE ADULT - ASSESSMENT
A/P : 73 y/o  with HTN, HLD, DM2, and Congestive heart failure here with cough for 5 days and new onset SOB. Patient reports he has a wet cough for last 5 days and overnight last evening had worsening shortness of breath. He reports being compliant with home medications, taking 40mg of Lasix today. He denies headache, throat pain, chest pain, dizziness, nausea, vomiting, diarrhea. Denies new lower extremity swelling.  in cdu, pt with continued sob/dyspnea, little exertion causes pt to be very sob, +coughing oxygen 93% on RA, requiring multiple nebs, cards called for possible CHF exacerbation, pt admitted to medicine. (25 Mar 2023 15:57)     he says he has no underlying lung disease:  + sob: +cough: : he has viral illness:  he has metapneumovirus infection:     CHF exacerbation : ac on ch diastolic failure : echo done : shows sever left ventricular hypertrophy and grade 3 diastolic failure : c/w lasix  : c/w eliquis : not sure why pt. is on AC     Pul HTN :  pa pressure is 73 mm hg : has grade 3 diastolic dysfunction : ? has underlying sleep apnea:      Viral PNA: start steroids and bd:  he is wheezing a lot      DM-1 : monitor and control     HTN : controlled    CKD stage 3  : stable: monitro creat: :    dvt prophylaxis:    dw acp           3/27:  CHF exacerbation : ac on ch diastolic failure : echo done : shows sever left ventricular hypertrophy and grade 3 diastolic failure : c/w lasix  : c/w eliquis :     Pul HTN :  pa pressure is 73 mm hg : has grade 3 diastolic dysfunction : ? has underlying sleep apnea:      Viral PNA: cont steroids  : wheezing is better:  decrease in AM  : ct scan chest showed: There is a chronic, linear-type peribronchial calcification in the right  middle lobe region. It measures 2.0cm in length and 2mm in thickness. This is a benign-type calcifications. It is stable and unchanged since 11/30/22 exam. No rounded subcentimeter right midlung opacities are seen.    DM-1 : monitor and control     HTN : controlled    CKD stage 3  : stable: monitro creat: :    dvt prophylaxis:    dw acp     3/28:    CHF exacerbation : ac on ch diastolic failure : echo done : shows sever left ventricular hypertrophy and grade 3 diastolic failure : c/w lasix  : c/w eliquis :   Pul HTN :  pa pressure is 73 mm hg : has grade 3 diastolic dysfunction : ? has underlying sleep apnea:  needs psg as an outpt and pft   Viral PNA: cont steroids  : wheezing is better:  decrease in AM  : ct scan chest showed: There is a chronic, linear-type peribronchial calcification in the right  middle lobe region. It measures 2.0cm in length and 2mm in thickness. This is a benign-type calcifications. It is stable and unchanged since 11/30/22 exam. No rounded subcentimeter right midlung opacities are seen.  DM-1 : monitor and control   HTN : controlled  CKD stage 3  : stable: monitro creat: :    dvt prophylaxis:    dw acp

## 2023-03-28 NOTE — PROGRESS NOTE ADULT - ASSESSMENT
71 y/o  with HTN, HLD, DM2, and Congestive heart failure here with cough for 5 days and new onset SOB. Patient reports he has a wet cough for last 5 days and overnight last evening had worsening shortness of breath.     CKD stage 3 B  baseline scr 1.8-2.0  renal function relatively stable   scr expected to rise with diuretics   On lasix  Obtain UA   Monitor at present     SOB  likely sec to CHF  Monitor daily weights  Low salt diet  Continue lasix      HTN  BP suboptimal  Start Amlodipine 5 mg qd   Continue to monitor

## 2023-03-28 NOTE — CONSULT NOTE ADULT - SUBJECTIVE AND OBJECTIVE BOX
DATE OF SERVICE: 03-28-23 @ 14:33    CHIEF COMPLAINT:Patient is a 72y old  Male who presents with a chief complaint of SOB (28 Mar 2023 11:07)      HISTORY OF PRESENT ILLNESS:HPI:  73 y/o  with HTN, HLD, DM2, and Congestive heart failure here with cough for 5 days and new onset SOB. Patient reports he has a wet cough for last 5 days and overnight last evening had worsening shortness of breath. He reports being compliant with home medications, taking 40mg of Lasix today. He denies headache, throat pain, chest pain, dizziness, nausea, vomiting, diarrhea. Denies new lower extremity swelling.  in cdu, pt with continued sob/dyspnea, little exertion causes pt to be very sob, +coughing oxygen 93% on RA, requiring multiple nebs, cards called for possible CHF exacerbation, pt admitted to medicine. (25 Mar 2023 15:57)      PAST MEDICAL & SURGICAL HISTORY:  Diabetes insipidus      Hypertension      Diabetes      No significant past surgical history              MEDICATIONS:  apixaban 5 milliGRAM(s) Oral two times a day  furosemide   Injectable 40 milliGRAM(s) IV Push two times a day  metoprolol succinate  milliGRAM(s) Oral daily      albuterol/ipratropium for Nebulization 3 milliLiter(s) Nebulizer every 6 hours PRN  guaiFENesin Oral Liquid (Sugar-Free) 200 milliGRAM(s) Oral every 6 hours PRN      pantoprazole    Tablet 40 milliGRAM(s) Oral before breakfast    atorvastatin 80 milliGRAM(s) Oral at bedtime  dextrose 50% Injectable 25 Gram(s) IV Push once  dextrose 50% Injectable 12.5 Gram(s) IV Push once  dextrose 50% Injectable 25 Gram(s) IV Push once  dextrose Oral Gel 15 Gram(s) Oral once PRN  glucagon  Injectable 1 milliGRAM(s) IntraMuscular once  insulin glargine Injectable (LANTUS) 20 Unit(s) SubCutaneous at bedtime  insulin lispro (ADMELOG) corrective regimen sliding scale   SubCutaneous three times a day before meals  insulin lispro Injectable (ADMELOG) 8 Unit(s) SubCutaneous before breakfast  insulin lispro Injectable (ADMELOG) 8 Unit(s) SubCutaneous before lunch  insulin lispro Injectable (ADMELOG) 8 Unit(s) SubCutaneous before dinner  methylPREDNISolone sodium succinate Injectable 20 milliGRAM(s) IV Push every 8 hours    dextrose 5%. 1000 milliLiter(s) IV Continuous <Continuous>  dextrose 5%. 1000 milliLiter(s) IV Continuous <Continuous>      FAMILY HISTORY:  FHx: myocardial infarction (Mother, Sibling)        Non-contributory    SOCIAL HISTORY:    [ -] Tobacco - former remote smoking  [ -] Drugs  [ -] Alcohol    Allergies    No Known Allergies    Intolerances    	    REVIEW OF SYSTEMS:  CONSTITUTIONAL: No fever  EYES: No eye pain, visual disturbances, or discharge  ENMT:  No difficulty hearing, tinnitus  NECK: No pain or stiffness  RESPIRATORY: No cough, wheezing, + SOB  CARDIOVASCULAR: No chest pain, palpitations, passing out, dizziness, or leg swelling  GASTROINTESTINAL:  No nausea, vomiting, diarrhea or constipation. No melena.  GENITOURINARY: No dysuria, hematuria  NEUROLOGICAL: No stroke like symptoms  SKIN: No burning or lesions   ENDOCRINE: No heat or cold intolerance  MUSCULOSKELETAL: No joint pain or swelling  PSYCHIATRIC: No  anxiety, mood swings  HEME/LYMPH: No bleeding gums  ALLERGY AND IMMUNOLOGIC: No hives or eczema	    All other ROS negative    PHYSICAL EXAM:  T(C): 36.4 (03-28-23 @ 11:21), Max: 37 (03-27-23 @ 20:41)  HR: 66 (03-28-23 @ 11:21) (66 - 74)  BP: 156/87 (03-28-23 @ 11:21) (153/80 - 166/96)  RR: 18 (03-28-23 @ 11:21) (18 - 20)  SpO2: 99% (03-28-23 @ 11:21) (97% - 99%)  Wt(kg): --  I&O's Summary    27 Mar 2023 07:01  -  28 Mar 2023 07:00  --------------------------------------------------------  IN: 740 mL / OUT: 0 mL / NET: 740 mL    28 Mar 2023 07:01  -  28 Mar 2023 14:33  --------------------------------------------------------  IN: 480 mL / OUT: 0 mL / NET: 480 mL        Appearance: Normal	  HEENT:   Normal oral mucosa, EOMI	  Cardiovascular:  S1 S2, No JVD,    Respiratory: diminished B/L  Psychiatry: Alert  Gastrointestinal:  Soft, Non-tender, + BS	  Skin: No rashes   Neurologic: Non-focal  Extremities:  No edema  Vascular: Peripheral pulses palpable    	    	  	  CARDIAC MARKERS:  Labs personally reviewed by me              03-28    137  |  100  |  63<H>  ----------------------------<  250<H>  4.7   |  22  |  2.19<H>    Ca    9.4      28 Mar 2023 06:17  Phos  4.3     03-28  Mg     2.1     03-28            EKG: Personally reviewed by me - SR with twi I, aVL, incomplete LBBB (unchanged from previous ECG)  Radiology: Personally reviewed by me -     < from: Xray Chest 2 Views PA/Lat (03.24.23 @ 17:43) >  There is a chronic, linear-type peribronchial calcification in the right   middle lobe region. It measures 2.0cm in length and 2mm in thickness.   This is a benign-type calcifications. It is stable and unchanged since   11/30/22 exam.  No rounded subcentimeter right midlung opacities are seen.  This represents a change from the original interpretation and was   electronically sent to ER by Dr. Alford abut 8:48AM pm 3/25/23.    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    < end of copied text >  < from: TTE W or WO Ultrasound Enhancing Agent (03.25.23 @ 14:04) >   1. Normal left ventricular cavity size. The left ventricular wall thickness is moderately increased. The left ventricular systolic function is normal with an ejection fraction of 63 % There are no regional wall motion abnormalities seen.   2. Global longitudinal strain is -13.0 % (abnormal > -16%). GLS was assessed on [employer] echo machine with a heart rate of 72 bpm and a blood pressure of 146/92 mmHg.   3. There is severe (grade 3) left ventricular diastolic dysfunction, with elevated left atrial filling pressure.   4. Normal right ventricular cavity size and normal wall thickness The tricuspid annular plane systolic excursion (TAPSE) is 1.8 cm (normal >=1.7 cm).   5. Severe pulmonary hypertension.   6. No pericardial effusion seen.    < end of copied text >  < from: TTE with Doppler (w/Cont) (12.01.22 @ 13:46) >  Conclusions:  1. Calcified aortic valve. The non and the left cusp appear  functionally fused by calcification.Unable to fully rule  out bicuspid valve with partial raffe. Peak transaortic  valve gradient equals 9 mm Hg, estimated aortic valve area  equals 2.3 sqcm (by planimetry)  2. Severe concentric left ventricular remodeling.  3. Moderate  global left ventricular systolic dysfunction.  Endocardial visualization enhancedwith intravenous  injection of Ultrasonic Enhancing Agent (Definity). No left  ventricular thrombus.  4. Normal right ventricular size and function.  5. Normal tricuspid valve. Minimal tricuspid regurgitation.  *** No previous Echo exam.    < end of copied text >  < from: Cardiac Catheterization (12.08.22 @ 15:11) >  1. Non-obstructive coronary artery disease with mild to moderate RCA  disease.  2. LVEDP 21   Recommendations:     1. Reccomend aggressive medical management as per ACC/AHA guidelines.   2. Ongoing HF optimization as per cardiology team.      < end of copied text >    Assessment /Plan:     73 y/o with HTN, HLD, DM2, mild-mod non-obstructive CAD and HFpEF who presents with cough for 5 days and new onset SOB and cough for 5 days. He denies headache, throat pain, chest pain, dizziness, nausea, vomiting, diarrhea. Denies new lower extremity swelling.    Problem/Plan -1  Problem: Shortness of Breath  - ECG at baseline (twi I, aVL with incomplete LBBB)  - Trop 94 --> 94  - proBNP 1506 (downtrending and lower than previous admisson)  - + hMPV  - TTE shows EF 63%, no WMA, severe diastolic dysfunction with elevated LA filling pressures, severe pHT and NO pericardial effusion (EF now improved from 2022)  - Amyloid scan in 11/22  strongly suggestive of transthyretin cardiac amyloidosis.  - Patient does not appear grossly overloaded. Currently denies SOB.  - Plan to transition to PO lasix tomorrow.  - SOB likely d/t viral PNA and ADHF    Problem/Plan -2  Problem: Heart Failure with preserved Ejection Fraction  - TTE shows EF 63%, no WMA, severe diastolic dysfunction with elevated LA filling pressures, severe pHT and NO pericardial effusion (EF now improved from 2022)  - proBNP 1506  - Patient does not appear volume overloaded. Plan to transition to PO lasix within the next 24 hours.    Problem/Plan -3  Problem: Cardiac Amyloid  - OP follow up for initiation of treatment    Problem/Plan -4  Problem: Coronary Artery Disease  - Denies CP or active SOB  - Trop elevated but possibly d/t demand i/s/o ADHF exacerbation  - Recent cath revealed mild-mod non-obstructive RCA disease  - c/w ASA, atorvastatin 80mg, Metoprolol 100mg     Problem/Plan -5  Problem: Chronic Atrial Fibrillation  - c/w eliquis 5mg PO BID  - c/w Metoprolol 100mg PO daily    Problem/Plan -5  Problem: Hyperlipidemia  - c/w Atorvastatin 80 and ezetimibe 10mg PO daily    Differential diagnosis and plan of care discussed with patient after the evaluation. Counseling on diet, nutritional counseling, weight management, exercise and medication compliance was done.   Advanced care planning/advanced directives discussed with patient/family. DNR status including forceful chest compressions to attempt to restart the heart, ventilator support/artificial breathing, electric shock, artificial nutrition, health care proxy, Molst form all discussed with pt. Pt wishes to consider. More than fifteen minutes spent on discussing advanced directives.     Vianney WU-BC  Shade Melton DO [facility]  Cardiovascular Medicine  [address] [place] [address] Dr, [address]  Available for call or text via [employer]  Office [phone]   DATE OF SERVICE: 03-28-23 @ 14:33    CHIEF COMPLAINT: Patient is a 72y old  Male who presents with a chief complaint of SOB (28 Mar 2023 11:07)      HISTORY OF PRESENT ILLNESS: HPI:  71 y/o  with HTN, HLD, DM2, and Congestive heart failure here with cough for 5 days and new onset SOB. Patient reports he has a wet cough for last 5 days and overnight last evening had worsening shortness of breath. He reports being compliant with home medications, taking 40mg of Lasix today. He denies headache, throat pain, chest pain, dizziness, nausea, vomiting, diarrhea. Denies new lower extremity swelling.  in cdu, pt with continued sob/dyspnea, little exertion causes pt to be very sob, +coughing oxygen 93% on RA, requiring multiple nebs, cards called for possible CHF exacerbation, pt admitted to medicine. (25 Mar 2023 15:57)      PAST MEDICAL & SURGICAL HISTORY:  Diabetes insipidus      Hypertension      Diabetes      No significant past surgical history        MEDICATIONS:  apixaban 5 milliGRAM(s) Oral two times a day  furosemide   Injectable 40 milliGRAM(s) IV Push two times a day  metoprolol succinate  milliGRAM(s) Oral daily    albuterol/ipratropium for Nebulization 3 milliLiter(s) Nebulizer every 6 hours PRN  guaiFENesin Oral Liquid (Sugar-Free) 200 milliGRAM(s) Oral every 6 hours PRN      pantoprazole    Tablet 40 milliGRAM(s) Oral before breakfast    atorvastatin 80 milliGRAM(s) Oral at bedtime  dextrose 50% Injectable 25 Gram(s) IV Push once  dextrose 50% Injectable 12.5 Gram(s) IV Push once  dextrose 50% Injectable 25 Gram(s) IV Push once  dextrose Oral Gel 15 Gram(s) Oral once PRN  glucagon  Injectable 1 milliGRAM(s) IntraMuscular once  insulin glargine Injectable (LANTUS) 20 Unit(s) SubCutaneous at bedtime  insulin lispro (ADMELOG) corrective regimen sliding scale   SubCutaneous three times a day before meals  insulin lispro Injectable (ADMELOG) 8 Unit(s) SubCutaneous before breakfast  insulin lispro Injectable (ADMELOG) 8 Unit(s) SubCutaneous before lunch  insulin lispro Injectable (ADMELOG) 8 Unit(s) SubCutaneous before dinner  methylPREDNISolone sodium succinate Injectable 20 milliGRAM(s) IV Push every 8 hours    dextrose 5%. 1000 milliLiter(s) IV Continuous <Continuous>  dextrose 5%. 1000 milliLiter(s) IV Continuous <Continuous>      FAMILY HISTORY:  FHx: myocardial infarction (Mother, Sibling)        Non-contributory    SOCIAL HISTORY:    [ -] Tobacco - former remote smoking  [ -] Drugs  [ -] Alcohol    Allergies    No Known Allergies    Intolerances    	    REVIEW OF SYSTEMS:  CONSTITUTIONAL: No fever  EYES: No eye pain, visual disturbances, or discharge  ENMT:  No difficulty hearing, tinnitus  NECK: No pain or stiffness  RESPIRATORY: No cough, wheezing, + SOB  CARDIOVASCULAR: No chest pain, palpitations, passing out, dizziness, or leg swelling  GASTROINTESTINAL:  No nausea, vomiting, diarrhea or constipation. No melena.  GENITOURINARY: No dysuria, hematuria  NEUROLOGICAL: No stroke like symptoms  SKIN: No burning or lesions   ENDOCRINE: No heat or cold intolerance  MUSCULOSKELETAL: No joint pain or swelling  PSYCHIATRIC: No  anxiety, mood swings  HEME/LYMPH: No bleeding gums  ALLERGY AND IMMUNOLOGIC: No hives or eczema	    All other ROS negative    PHYSICAL EXAM:  T(C): 36.4 (03-28-23 @ 11:21), Max: 37 (03-27-23 @ 20:41)  HR: 66 (03-28-23 @ 11:21) (66 - 74)  BP: 156/87 (03-28-23 @ 11:21) (153/80 - 166/96)  RR: 18 (03-28-23 @ 11:21) (18 - 20)  SpO2: 99% (03-28-23 @ 11:21) (97% - 99%)  Wt(kg): --  I&O's Summary    27 Mar 2023 07:01  -  28 Mar 2023 07:00  --------------------------------------------------------  IN: 740 mL / OUT: 0 mL / NET: 740 mL    28 Mar 2023 07:01  -  28 Mar 2023 14:33  --------------------------------------------------------  IN: 480 mL / OUT: 0 mL / NET: 480 mL        Appearance: Normal	  HEENT:   Normal oral mucosa, EOMI	  Cardiovascular:  S1 S2, No JVD,    Respiratory: diminished B/L  Psychiatry: Alert  Gastrointestinal:  Soft, Non-tender, + BS	  Skin: No rashes   Neurologic: Non-focal  Extremities:  No edema  Vascular: Peripheral pulses palpable    	    	  	  CARDIAC MARKERS:  Labs personally reviewed by me              03-28    137  |  100  |  63<H>  ----------------------------<  250<H>  4.7   |  22  |  2.19<H>    Ca    9.4      28 Mar 2023 06:17  Phos  4.3     03-28  Mg     2.1     03-28            EKG: Personally reviewed by me - SR with twi I, aVL, incomplete LBBB (unchanged from previous ECG)  Radiology: Personally reviewed by me -     < from: Xray Chest 2 Views PA/Lat (03.24.23 @ 17:43) >  There is a chronic, linear-type peribronchial calcification in the right   middle lobe region. It measures 2.0cm in length and 2mm in thickness.   This is a benign-type calcifications. It is stable and unchanged since   11/30/22 exam.  No rounded subcentimeter right midlung opacities are seen.  This represents a change from the original interpretation and was   electronically sent to ER by Dr. Alford abut 8:48AM pm 3/25/23.    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    < end of copied text >  < from: TTE W or WO Ultrasound Enhancing Agent (03.25.23 @ 14:04) >   1. Normal left ventricular cavity size. The left ventricular wall thickness is moderately increased. The left ventricular systolic function is normal with an ejection fraction of 63 % There are no regional wall motion abnormalities seen.   2. Global longitudinal strain is -13.0 % (abnormal > -16%). GLS was assessed on [employer] echo machine with a heart rate of 72 bpm and a blood pressure of 146/92 mmHg.   3. There is severe (grade 3) left ventricular diastolic dysfunction, with elevated left atrial filling pressure.   4. Normal right ventricular cavity size and normal wall thickness The tricuspid annular plane systolic excursion (TAPSE) is 1.8 cm (normal >=1.7 cm).   5. Severe pulmonary hypertension.   6. No pericardial effusion seen.    < end of copied text >  < from: TTE with Doppler (w/Cont) (12.01.22 @ 13:46) >  Conclusions:  1. Calcified aortic valve. The non and the left cusp appear  functionally fused by calcification.Unable to fully rule  out bicuspid valve with partial raffe. Peak transaortic  valve gradient equals 9 mm Hg, estimated aortic valve area  equals 2.3 sqcm (by planimetry)  2. Severe concentric left ventricular remodeling.  3. Moderate  global left ventricular systolic dysfunction.  Endocardial visualization enhancedwith intravenous  injection of Ultrasonic Enhancing Agent (Definity). No left  ventricular thrombus.  4. Normal right ventricular size and function.  5. Normal tricuspid valve. Minimal tricuspid regurgitation.  *** No previous Echo exam.    < end of copied text >  < from: Cardiac Catheterization (12.08.22 @ 15:11) >  1. Non-obstructive coronary artery disease with mild to moderate RCA  disease.  2. LVEDP 21   Recommendations:     1. Reccomend aggressive medical management as per ACC/AHA guidelines.   2. Ongoing HF optimization as per cardiology team.      < end of copied text >          Assessment /Plan:   71 y/o with HTN, HLD, DM2, mild-mod non-obstructive CAD and HFpEF who presents with cough for 5 days and new onset SOB and cough for 5 days. He denies headache, throat pain, chest pain, dizziness, nausea, vomiting, diarrhea. Denies new lower extremity swelling.    Problem/Plan -1  Problem: Shortness of Breath  - ECG at baseline (twi I, aVL with incomplete LBBB)  - Trop 94 --> 94  - proBNP 1506 (downtrending and lower than previous admisson)  - + hMPV  - TTE shows EF 63%, no WMA, severe diastolic dysfunction with elevated LA filling pressures, severe pHT and NO pericardial effusion (EF now improved from 2022)  - Amyloid scan in 11/22  strongly suggestive of transthyretin cardiac amyloidosis.  - Patient does not appear grossly overloaded. Currently denies SOB.  - Plan to transition to PO lasix tomorrow.  - SOB likely d/t viral PNA and ADHF    Problem/Plan -2  Problem: Heart Failure with preserved Ejection Fraction  - TTE shows EF 63%, no WMA, severe diastolic dysfunction with elevated LA filling pressures, severe pHT and NO pericardial effusion (EF now improved from 2022)  - proBNP 1506  - Patient does not appear volume overloaded. Plan to transition to PO lasix within the next 24 hours.    Problem/Plan -3  Problem: Cardiac Amyloid  - OP follow up for initiation of treatment    Problem/Plan -4  Problem: Coronary Artery Disease  - Denies CP or active SOB  - Trop elevated but possibly d/t demand i/s/o ADHF exacerbation  - Recent cath revealed mild-mod non-obstructive RCA disease  - c/w ASA, atorvastatin 80mg, Metoprolol 100mg     Problem/Plan -5  Problem: Chronic Atrial Fibrillation  - c/w eliquis 5mg PO BID  - c/w Metoprolol 100mg PO daily    Problem/Plan -5  Problem: Hyperlipidemia  - c/w Atorvastatin 80 and ezetimibe 10mg PO daily      Close OP follow up with Dr Caryl Delvalle      Differential diagnosis and plan of care discussed with patient after the evaluation. Counseling on diet, nutritional counseling, weight management, exercise and medication compliance was done.   Advanced care planning/advanced directives discussed with patient/family. DNR status including forceful chest compressions to attempt to restart the heart, ventilator support/artificial breathing, electric shock, artificial nutrition, health care proxy, Molst form all discussed with pt. Pt wishes to consider. More than fifteen minutes spent on discussing advanced directives.     Vianney Lemos [facility]-BC  Shade Melton DO [facility]  Cardiovascular Medicine  [address], [address]  Available for call or text via [employer]  Office [phone]

## 2023-03-29 ENCOUNTER — TRANSCRIPTION ENCOUNTER (OUTPATIENT)
Age: 73
End: 2023-03-29

## 2023-03-29 VITALS
HEART RATE: 63 BPM | WEIGHT: 223.55 LBS | OXYGEN SATURATION: 96 % | RESPIRATION RATE: 18 BRPM | DIASTOLIC BLOOD PRESSURE: 89 MMHG | TEMPERATURE: 98 F | SYSTOLIC BLOOD PRESSURE: 145 MMHG

## 2023-03-29 LAB
ANION GAP SERPL CALC-SCNC: 15 MMOL/L — SIGNIFICANT CHANGE UP (ref 5–17)
BUN SERPL-MCNC: 69 MG/DL — HIGH (ref 7–23)
CALCIUM SERPL-MCNC: 9.5 MG/DL — SIGNIFICANT CHANGE UP (ref 8.4–10.5)
CHLORIDE SERPL-SCNC: 99 MMOL/L — SIGNIFICANT CHANGE UP (ref 96–108)
CO2 SERPL-SCNC: 21 MMOL/L — LOW (ref 22–31)
CREAT SERPL-MCNC: 2.11 MG/DL — HIGH (ref 0.5–1.3)
EGFR: 33 ML/MIN/1.73M2 — LOW
GLUCOSE BLDC GLUCOMTR-MCNC: 261 MG/DL — HIGH (ref 70–99)
GLUCOSE BLDC GLUCOMTR-MCNC: 313 MG/DL — HIGH (ref 70–99)
GLUCOSE BLDC GLUCOMTR-MCNC: 356 MG/DL — HIGH (ref 70–99)
GLUCOSE BLDC GLUCOMTR-MCNC: 413 MG/DL — HIGH (ref 70–99)
GLUCOSE SERPL-MCNC: 318 MG/DL — HIGH (ref 70–99)
MAGNESIUM SERPL-MCNC: 2.4 MG/DL — SIGNIFICANT CHANGE UP (ref 1.6–2.6)
POTASSIUM SERPL-MCNC: 4.6 MMOL/L — SIGNIFICANT CHANGE UP (ref 3.5–5.3)
POTASSIUM SERPL-SCNC: 4.6 MMOL/L — SIGNIFICANT CHANGE UP (ref 3.5–5.3)
SODIUM SERPL-SCNC: 135 MMOL/L — SIGNIFICANT CHANGE UP (ref 135–145)

## 2023-03-29 PROCEDURE — 99285 EMERGENCY DEPT VISIT HI MDM: CPT

## 2023-03-29 PROCEDURE — 83735 ASSAY OF MAGNESIUM: CPT

## 2023-03-29 PROCEDURE — 93356 MYOCRD STRAIN IMG SPCKL TRCK: CPT

## 2023-03-29 PROCEDURE — 83880 ASSAY OF NATRIURETIC PEPTIDE: CPT

## 2023-03-29 PROCEDURE — 83036 HEMOGLOBIN GLYCOSYLATED A1C: CPT

## 2023-03-29 PROCEDURE — 71046 X-RAY EXAM CHEST 2 VIEWS: CPT

## 2023-03-29 PROCEDURE — 94640 AIRWAY INHALATION TREATMENT: CPT

## 2023-03-29 PROCEDURE — 80053 COMPREHEN METABOLIC PANEL: CPT

## 2023-03-29 PROCEDURE — 0225U NFCT DS DNA&RNA 21 SARSCOV2: CPT

## 2023-03-29 PROCEDURE — 93306 TTE W/DOPPLER COMPLETE: CPT

## 2023-03-29 PROCEDURE — 36415 COLL VENOUS BLD VENIPUNCTURE: CPT

## 2023-03-29 PROCEDURE — 96374 THER/PROPH/DIAG INJ IV PUSH: CPT

## 2023-03-29 PROCEDURE — 85027 COMPLETE CBC AUTOMATED: CPT

## 2023-03-29 PROCEDURE — 76376 3D RENDER W/INTRP POSTPROCES: CPT

## 2023-03-29 PROCEDURE — 80048 BASIC METABOLIC PNL TOTAL CA: CPT

## 2023-03-29 PROCEDURE — 82962 GLUCOSE BLOOD TEST: CPT

## 2023-03-29 PROCEDURE — 84484 ASSAY OF TROPONIN QUANT: CPT

## 2023-03-29 PROCEDURE — 85025 COMPLETE CBC W/AUTO DIFF WBC: CPT

## 2023-03-29 PROCEDURE — 84100 ASSAY OF PHOSPHORUS: CPT

## 2023-03-29 RX ORDER — FUROSEMIDE 40 MG
1 TABLET ORAL
Qty: 30 | Refills: 0
Start: 2023-03-29

## 2023-03-29 RX ORDER — FUROSEMIDE 40 MG
40 TABLET ORAL DAILY
Refills: 0 | Status: DISCONTINUED | OUTPATIENT
Start: 2023-03-30 | End: 2023-03-29

## 2023-03-29 RX ORDER — PANTOPRAZOLE SODIUM 20 MG/1
1 TABLET, DELAYED RELEASE ORAL
Qty: 30 | Refills: 0
Start: 2023-03-29 | End: 2023-04-27

## 2023-03-29 RX ORDER — INSULIN GLARGINE 100 [IU]/ML
20 INJECTION, SOLUTION SUBCUTANEOUS
Qty: 30 | Refills: 0
Start: 2023-03-29 | End: 2023-01-08

## 2023-03-29 RX ORDER — INSULIN ASPART 100 [IU]/ML
8 INJECTION, SOLUTION SUBCUTANEOUS
Qty: 1 | Refills: 0
Start: 2023-03-29 | End: 2023-04-27

## 2023-03-29 RX ADMIN — Medication 20 MILLIGRAM(S): at 06:57

## 2023-03-29 RX ADMIN — Medication 20 MILLIGRAM(S): at 13:35

## 2023-03-29 RX ADMIN — APIXABAN 5 MILLIGRAM(S): 2.5 TABLET, FILM COATED ORAL at 06:56

## 2023-03-29 RX ADMIN — Medication 40 MILLIGRAM(S): at 06:57

## 2023-03-29 RX ADMIN — PANTOPRAZOLE SODIUM 40 MILLIGRAM(S): 20 TABLET, DELAYED RELEASE ORAL at 06:56

## 2023-03-29 RX ADMIN — Medication 6: at 17:12

## 2023-03-29 RX ADMIN — Medication 8: at 08:51

## 2023-03-29 RX ADMIN — Medication 10: at 11:56

## 2023-03-29 RX ADMIN — Medication 100 MILLIGRAM(S): at 06:56

## 2023-03-29 RX ADMIN — Medication 8 UNIT(S): at 08:51

## 2023-03-29 RX ADMIN — Medication 8 UNIT(S): at 17:13

## 2023-03-29 RX ADMIN — Medication 8 UNIT(S): at 11:57

## 2023-03-29 RX ADMIN — Medication 40 MILLIGRAM(S): at 13:35

## 2023-03-29 RX ADMIN — APIXABAN 5 MILLIGRAM(S): 2.5 TABLET, FILM COATED ORAL at 17:14

## 2023-03-29 NOTE — DISCHARGE NOTE NURSING/CASE MANAGEMENT/SOCIAL WORK - [email or web address]_ALL_CORE
[facility] email [email or web address]

## 2023-03-29 NOTE — PROGRESS NOTE ADULT - SUBJECTIVE AND OBJECTIVE BOX
Date of Service: 03-28-23 @ 15:08    Patient is a 72y old  Male who presents with a chief complaint of SOB (28 Mar 2023 14:32)      Any change in ROS:  doing pretty good:  still with mild wheezing    MEDICATIONS  (STANDING):  apixaban 5 milliGRAM(s) Oral two times a day  atorvastatin 80 milliGRAM(s) Oral at bedtime  dextrose 5%. 1000 milliLiter(s) (100 mL/Hr) IV Continuous <Continuous>  dextrose 5%. 1000 milliLiter(s) (50 mL/Hr) IV Continuous <Continuous>  dextrose 50% Injectable 25 Gram(s) IV Push once  dextrose 50% Injectable 12.5 Gram(s) IV Push once  dextrose 50% Injectable 25 Gram(s) IV Push once  furosemide   Injectable 40 milliGRAM(s) IV Push two times a day  glucagon  Injectable 1 milliGRAM(s) IntraMuscular once  insulin glargine Injectable (LANTUS) 20 Unit(s) SubCutaneous at bedtime  insulin lispro (ADMELOG) corrective regimen sliding scale   SubCutaneous three times a day before meals  insulin lispro Injectable (ADMELOG) 8 Unit(s) SubCutaneous before breakfast  insulin lispro Injectable (ADMELOG) 8 Unit(s) SubCutaneous before lunch  insulin lispro Injectable (ADMELOG) 8 Unit(s) SubCutaneous before dinner  methylPREDNISolone sodium succinate Injectable 20 milliGRAM(s) IV Push every 8 hours  metoprolol succinate  milliGRAM(s) Oral daily  pantoprazole    Tablet 40 milliGRAM(s) Oral before breakfast    MEDICATIONS  (PRN):  albuterol/ipratropium for Nebulization 3 milliLiter(s) Nebulizer every 6 hours PRN Shortness of Breath and/or Wheezing  dextrose Oral Gel 15 Gram(s) Oral once PRN Blood Glucose LESS THAN 70 milliGRAM(s)/deciliter  guaiFENesin Oral Liquid (Sugar-Free) 200 milliGRAM(s) Oral every 6 hours PRN Cough    Vital Signs Last 24 Hrs  T(C): 36.4 (28 Mar 2023 11:21), Max: 37 (27 Mar 2023 20:41)  T(F): 97.6 (28 Mar 2023 11:21), Max: 98.6 (27 Mar 2023 20:41)  HR: 66 (28 Mar 2023 11:21) (66 - 74)  BP: 156/87 (28 Mar 2023 11:21) (153/80 - 166/96)  BP(mean): --  RR: 18 (28 Mar 2023 11:21) (18 - 20)  SpO2: 99% (28 Mar 2023 11:21) (97% - 99%)    Parameters below as of 28 Mar 2023 11:21  Patient On (Oxygen Delivery Method): room air        I&O's Summary    27 Mar 2023 07:01  -  28 Mar 2023 07:00  --------------------------------------------------------  IN: 740 mL / OUT: 0 mL / NET: 740 mL    28 Mar 2023 07:01  -  28 Mar 2023 15:08  --------------------------------------------------------  IN: 480 mL / OUT: 0 mL / NET: 480 mL          Physical Exam:   GENERAL: NAD, well-groomed, well-developed  HEENT: TIERA/   Atraumatic, Normocephalic  ENMT: No tonsillar erythema, exudates, or enlargement; Moist mucous membranes, Good dentition, No lesions  NECK: Supple, No JVD, Normal thyroid  CHEST/LUNG: mILD WHEEZING  CVS: Regular rate and rhythm; No murmurs, rubs, or gallops  GI: : Soft, Nontender, Nondistended; Bowel sounds present  NERVOUS SYSTEM:  Alert & Oriented X3  EXTREMITIES:  2+ Peripheral Pulses, No clubbing, cyanosis, or edema  LYMPH: No lymphadenopathy noted  SKIN: No rashes or lesions  ENDOCRINOLOGY: No Thyromegaly  PSYCH: Appropriate    Labs:                              13.0   3.68  )-----------( 177      ( 26 Mar 2023 06:01 )             40.7                         14.1   5.22  )-----------( 203      ( 24 Mar 2023 18:20 )             44.4     03-28    137  |  100  |  63<H>  ----------------------------<  250<H>  4.7   |  22  |  2.19<H>  03-27    139  |  102  |  49<H>  ----------------------------<  294<H>  4.6   |  24  |  2.05<H>  03-26    141  |  105  |  34<H>  ----------------------------<  190<H>  4.5   |  24  |  1.73<H>  03-25    139  |  102  |  31<H>  ----------------------------<  215<H>  4.5   |  25  |  1.91<H>  03-24    140  |  102  |  28<H>  ----------------------------<  175<H>  4.3   |  26  |  1.84<H>    Ca    9.4      28 Mar 2023 06:17  Ca    10.3      27 Mar 2023 06:23  Phos  4.3     03-28  Phos  4.9     03-27  Mg     2.1     03-28  Mg     2.2     03-27    TPro  6.8  /  Alb  3.7  /  TBili  0.4  /  DBili  x   /  AST  18  /  ALT  20  /  AlkPhos  44  03-25  TPro  7.8  /  Alb  4.3  /  TBili  0.4  /  DBili  x   /  AST  25  /  ALT  25  /  AlkPhos  53  03-24    CAPILLARY BLOOD GLUCOSE      POCT Blood Glucose.: 279 mg/dL (28 Mar 2023 11:28)  POCT Blood Glucose.: 245 mg/dL (28 Mar 2023 07:46)  POCT Blood Glucose.: 338 mg/dL (27 Mar 2023 21:58)  POCT Blood Glucose.: 342 mg/dL (27 Mar 2023 17:11)          RAD< from: Xray Chest 2 Views PA/Lat (03.24.23 @ 17:43) >  There is a chronic, linear-type peribronchial calcification in the right   middle lobe region. It measures 2.0cm in length and 2mm in thickness.   This is a benign-type calcifications. It is stable and unchanged since   11/30/22 exam.  No rounded subcentimeter right midlung opacities are seen.  This represents a change from the original interpretation and was   electronically sent to ER by Dr. Alford abut 8:48AM pm 3/25/23.  --- End of Report ---    < end of copied text >          RECENT CULTURES:        RESPIRATORY CULTURES:          Studies  Chest X-RAY  CT SCAN Chest   Venous Dopplers: LE:   CT Abdomen  Others              
Date of Service: 03-29-23 @ 17:01    Patient is a 72y old  Male who presents with a chief complaint of SOB (29 Mar 2023 16:42)      Any change in ROS:  doing ok : on room air:      MEDICATIONS  (STANDING):  apixaban 5 milliGRAM(s) Oral two times a day  atorvastatin 80 milliGRAM(s) Oral at bedtime  dextrose 5%. 1000 milliLiter(s) (100 mL/Hr) IV Continuous <Continuous>  dextrose 5%. 1000 milliLiter(s) (50 mL/Hr) IV Continuous <Continuous>  dextrose 50% Injectable 25 Gram(s) IV Push once  dextrose 50% Injectable 12.5 Gram(s) IV Push once  dextrose 50% Injectable 25 Gram(s) IV Push once  glucagon  Injectable 1 milliGRAM(s) IntraMuscular once  insulin glargine Injectable (LANTUS) 20 Unit(s) SubCutaneous at bedtime  insulin lispro (ADMELOG) corrective regimen sliding scale   SubCutaneous three times a day before meals  insulin lispro Injectable (ADMELOG) 8 Unit(s) SubCutaneous before breakfast  insulin lispro Injectable (ADMELOG) 8 Unit(s) SubCutaneous before lunch  insulin lispro Injectable (ADMELOG) 8 Unit(s) SubCutaneous before dinner  metoprolol succinate  milliGRAM(s) Oral daily  pantoprazole    Tablet 40 milliGRAM(s) Oral before breakfast    MEDICATIONS  (PRN):  albuterol/ipratropium for Nebulization 3 milliLiter(s) Nebulizer every 6 hours PRN Shortness of Breath and/or Wheezing  dextrose Oral Gel 15 Gram(s) Oral once PRN Blood Glucose LESS THAN 70 milliGRAM(s)/deciliter  guaiFENesin Oral Liquid (Sugar-Free) 200 milliGRAM(s) Oral every 6 hours PRN Cough    Vital Signs Last 24 Hrs  T(C): 36.5 (29 Mar 2023 10:50), Max: 36.5 (29 Mar 2023 10:50)  T(F): 97.7 (29 Mar 2023 10:50), Max: 97.7 (29 Mar 2023 10:50)  HR: 63 (29 Mar 2023 10:50) (62 - 68)  BP: 145/89 (29 Mar 2023 10:50) (144/84 - 169/91)  BP(mean): --  RR: 18 (29 Mar 2023 10:50) (18 - 18)  SpO2: 96% (29 Mar 2023 10:50) (96% - 97%)    Parameters below as of 29 Mar 2023 10:50  Patient On (Oxygen Delivery Method): room air        I&O's Summary    28 Mar 2023 07:01  -  29 Mar 2023 07:00  --------------------------------------------------------  IN: 840 mL / OUT: 0 mL / NET: 840 mL    29 Mar 2023 07:01  -  29 Mar 2023 17:01  --------------------------------------------------------  IN: 360 mL / OUT: 0 mL / NET: 360 mL          Physical Exam:   GENERAL: NAD, well-groomed, well-developed  HEENT: TIERA/   Atraumatic, Normocephalic  ENMT: No tonsillar erythema, exudates, or enlargement; Moist mucous membranes, Good dentition, No lesions  NECK: Supple, No JVD, Normal thyroid  CHEST/LUNG: Minimal wheezing  CVS: Regular rate and rhythm; No murmurs, rubs, or gallops  GI: : Soft, Nontender, Nondistended; Bowel sounds present  NERVOUS SYSTEM:  Alert & Oriented X30  EXTREMITIES:  2+ Peripheral Pulses, No clubbing, cyanosis, or edema  LYMPH: No lymphadenopathy noted  SKIN: No rashes or lesions  ENDOCRINOLOGY: No Thyromegaly  PSYCH: Appropriate    Labs:                              13.0   3.68  )-----------( 177      ( 26 Mar 2023 06:01 )             40.7     03-29    135  |  99  |  69<H>  ----------------------------<  318<H>  4.6   |  21<L>  |  2.11<H>  03-28    137  |  100  |  63<H>  ----------------------------<  250<H>  4.7   |  22  |  2.19<H>  03-27    139  |  102  |  49<H>  ----------------------------<  294<H>  4.6   |  24  |  2.05<H>  03-26    141  |  105  |  34<H>  ----------------------------<  190<H>  4.5   |  24  |  1.73<H>    Ca    9.5      29 Mar 2023 06:17  Ca    9.4      28 Mar 2023 06:17  Phos  4.3     03-28  Mg     2.4     03-29  Mg     2.1     03-28      CAPILLARY BLOOD GLUCOSE      POCT Blood Glucose.: 356 mg/dL (29 Mar 2023 11:48)  POCT Blood Glucose.: 413 mg/dL (29 Mar 2023 11:47)  POCT Blood Glucose.: 313 mg/dL (29 Mar 2023 08:32)  POCT Blood Glucose.: 299 mg/dL (28 Mar 2023 22:06)  POCT Blood Glucose.: 204 mg/dL (28 Mar 2023 17:27)            rad< from: Xray Chest 2 Views PA/Lat (03.24.23 @ 17:43) >  *** ADDENDUM # 1 ***    There is a chronic, linear-type peribronchial calcification in the right   middle lobe region. It measures 2.0cm in length and 2mm in thickness.   This is a benign-type calcifications. It is stable and unchanged since   11/30/22 exam.  No rounded subcentimeter right midlung opacities are seen.  This represents a change from the original interpretation and was   electronically sent to ER by Dr. Alford abut 8:48AM pm 3/25/23.  --- End of Report ---    *** END OF ADDENDUM # 1 ***    < end of copied text >        RECENT CULTURES:        RESPIRATORY CULTURES:          Studies  Chest X-RAY  CT SCAN Chest   Venous Dopplers: LE:   CT Abdomen  Others              
Patient is a 72y old  Male who presents with a chief complaint of SOB (26 Mar 2023 14:07)      INTERVAL HPI/OVERNIGHT EVENTS: still wheezing and SOB   T(C): 37 (03-26-23 @ 21:40), Max: 37 (03-26-23 @ 20:17)  HR: 63 (03-26-23 @ 21:40) (63 - 77)  BP: 155/75 (03-26-23 @ 21:40) (153/93 - 161/70)  RR: 18 (03-26-23 @ 21:40) (18 - 19)  SpO2: 96% (03-26-23 @ 21:40) (95% - 96%)  Wt(kg): --  I&O's Summary    25 Mar 2023 07:01  -  26 Mar 2023 07:00  --------------------------------------------------------  IN: 360 mL / OUT: 0 mL / NET: 360 mL    26 Mar 2023 07:01  -  27 Mar 2023 02:14  --------------------------------------------------------  IN: 1200 mL / OUT: 400 mL / NET: 800 mL        PAST MEDICAL & SURGICAL HISTORY:  Diabetes insipidus      Hypertension      Diabetes      No significant past surgical history          SOCIAL HISTORY  Alcohol:  Tobacco:  Illicit substance use:    FAMILY HISTORY:    REVIEW OF SYSTEMS:  CONSTITUTIONAL: No fever, weight loss, or fatigue  EYES: No eye pain, visual disturbances, or discharge  ENMT:  No difficulty hearing, tinnitus, vertigo; No sinus or throat pain  NECK: No pain or stiffness  RESPIRATORY: No cough, wheezing, chills or hemoptysis; No shortness of breath  CARDIOVASCULAR: No chest pain, palpitations, dizziness, or leg swelling  GASTROINTESTINAL: No abdominal or epigastric pain. No nausea, vomiting, or hematemesis; No diarrhea or constipation. No melena or hematochezia.  GENITOURINARY: No dysuria, frequency, hematuria, or incontinence  NEUROLOGICAL: No headaches, memory loss, loss of strength, numbness, or tremors  SKIN: No itching, burning, rashes, or lesions   LYMPH NODES: No enlarged glands  ENDOCRINE: No heat or cold intolerance; No hair loss  MUSCULOSKELETAL: No joint pain or swelling; No muscle, back, or extremity pain  PSYCHIATRIC: No depression, anxiety, mood swings, or difficulty sleeping  HEME/LYMPH: No easy bruising, or bleeding gums  ALLERY AND IMMUNOLOGIC: No hives or eczema    RADIOLOGY & ADDITIONAL TESTS:    Imaging Personally Reviewed:  [ ] YES  [ ] NO    Consultant(s) Notes Reviewed:  [ ] YES  [ ] NO    PHYSICAL EXAM:  GENERAL: NAD, well-groomed, well-developed  HEAD:  Atraumatic, Normocephalic  EYES: EOMI, PERRLA, conjunctiva and sclera clear  ENMT: No tonsillar erythema, exudates, or enlargement; Moist mucous membranes, Good dentition, No lesions  NECK: Supple, No JVD, Normal thyroid  NERVOUS SYSTEM:  Alert & Oriented X3, Good concentration; Motor Strength 5/5 B/L upper and lower extremities; DTRs 2+ intact and symmetric  CHEST/LUNG: Clear to percussion bilaterally; No rales, rhonchi, wheezing, or rubs  HEART: Regular rate and rhythm; No murmurs, rubs, or gallops  ABDOMEN: Soft, Nontender, Nondistended; Bowel sounds present  EXTREMITIES:  2+ Peripheral Pulses, No clubbing, cyanosis, or edema  LYMPH: No lymphadenopathy noted  SKIN: No rashes or lesions    LABS:                        13.0   3.68  )-----------( 177      ( 26 Mar 2023 06:01 )             40.7     03-26    141  |  105  |  34<H>  ----------------------------<  190<H>  4.5   |  24  |  1.73<H>    Ca    9.3      26 Mar 2023 06:01  Mg     2.1     03-26    TPro  6.8  /  Alb  3.7  /  TBili  0.4  /  DBili  x   /  AST  18  /  ALT  20  /  AlkPhos  44  03-25        CAPILLARY BLOOD GLUCOSE      POCT Blood Glucose.: 219 mg/dL (26 Mar 2023 22:31)  POCT Blood Glucose.: 167 mg/dL (26 Mar 2023 16:58)  POCT Blood Glucose.: 240 mg/dL (26 Mar 2023 11:58)  POCT Blood Glucose.: 176 mg/dL (26 Mar 2023 08:53)            MEDICATIONS  (STANDING):  apixaban 5 milliGRAM(s) Oral two times a day  atorvastatin 80 milliGRAM(s) Oral at bedtime  dextrose 5%. 1000 milliLiter(s) (100 mL/Hr) IV Continuous <Continuous>  dextrose 5%. 1000 milliLiter(s) (50 mL/Hr) IV Continuous <Continuous>  dextrose 50% Injectable 25 Gram(s) IV Push once  dextrose 50% Injectable 12.5 Gram(s) IV Push once  dextrose 50% Injectable 25 Gram(s) IV Push once  furosemide   Injectable 40 milliGRAM(s) IV Push two times a day  glucagon  Injectable 1 milliGRAM(s) IntraMuscular once  insulin glargine Injectable (LANTUS) 20 Unit(s) SubCutaneous at bedtime  insulin lispro (ADMELOG) corrective regimen sliding scale   SubCutaneous three times a day before meals  insulin lispro Injectable (ADMELOG) 5 Unit(s) SubCutaneous before breakfast  insulin lispro Injectable (ADMELOG) 5 Unit(s) SubCutaneous before lunch  insulin lispro Injectable (ADMELOG) 5 Unit(s) SubCutaneous before dinner  methylPREDNISolone sodium succinate Injectable 20 milliGRAM(s) IV Push every 8 hours  metoprolol succinate  milliGRAM(s) Oral daily  pantoprazole    Tablet 40 milliGRAM(s) Oral before breakfast    MEDICATIONS  (PRN):  albuterol/ipratropium for Nebulization 3 milliLiter(s) Nebulizer every 6 hours PRN Shortness of Breath and/or Wheezing  dextrose Oral Gel 15 Gram(s) Oral once PRN Blood Glucose LESS THAN 70 milliGRAM(s)/deciliter  guaiFENesin Oral Liquid (Sugar-Free) 200 milliGRAM(s) Oral every 6 hours PRN Cough      Care Discussed with Consultants/Other Providers [ ] YES  [ ] NO
Patient is a 72y old  Male who presents with a chief complaint of SOB (28 Mar 2023 15:08)      INTERVAL HPI/OVERNIGHT EVENTS: seen and examined   T(C): 36.4 (03-28-23 @ 21:17), Max: 36.4 (03-28-23 @ 05:05)  HR: 68 (03-28-23 @ 21:17) (66 - 71)  BP: 169/91 (03-28-23 @ 21:17) (153/80 - 169/91)  RR: 18 (03-28-23 @ 21:17) (18 - 18)  SpO2: 97% (03-28-23 @ 21:17) (97% - 99%)  Wt(kg): --  I&O's Summary    27 Mar 2023 07:01  -  28 Mar 2023 07:00  --------------------------------------------------------  IN: 740 mL / OUT: 0 mL / NET: 740 mL    28 Mar 2023 07:01  -  29 Mar 2023 02:29  --------------------------------------------------------  IN: 840 mL / OUT: 0 mL / NET: 840 mL        PAST MEDICAL & SURGICAL HISTORY:  Diabetes insipidus      Hypertension      Diabetes      No significant past surgical history          SOCIAL HISTORY  Alcohol:  Tobacco:  Illicit substance use:    FAMILY HISTORY:    REVIEW OF SYSTEMS:  CONSTITUTIONAL: No fever, weight loss, or fatigue  EYES: No eye pain, visual disturbances, or discharge  ENMT:  No difficulty hearing, tinnitus, vertigo; No sinus or throat pain  NECK: No pain or stiffness  RESPIRATORY: No cough, wheezing, chills or hemoptysis; No shortness of breath  CARDIOVASCULAR: No chest pain, palpitations, dizziness, or leg swelling  GASTROINTESTINAL: No abdominal or epigastric pain. No nausea, vomiting, or hematemesis; No diarrhea or constipation. No melena or hematochezia.  GENITOURINARY: No dysuria, frequency, hematuria, or incontinence  NEUROLOGICAL: No headaches, memory loss, loss of strength, numbness, or tremors  SKIN: No itching, burning, rashes, or lesions   LYMPH NODES: No enlarged glands  ENDOCRINE: No heat or cold intolerance; No hair loss  MUSCULOSKELETAL: No joint pain or swelling; No muscle, back, or extremity pain  PSYCHIATRIC: No depression, anxiety, mood swings, or difficulty sleeping  HEME/LYMPH: No easy bruising, or bleeding gums  ALLERY AND IMMUNOLOGIC: No hives or eczema    RADIOLOGY & ADDITIONAL TESTS:    Imaging Personally Reviewed:  [ ] YES  [ ] NO    Consultant(s) Notes Reviewed:  [ ] YES  [ ] NO    PHYSICAL EXAM:  GENERAL: NAD, well-groomed, well-developed  HEAD:  Atraumatic, Normocephalic  EYES: EOMI, PERRLA, conjunctiva and sclera clear  ENMT: No tonsillar erythema, exudates, or enlargement; Moist mucous membranes, Good dentition, No lesions  NECK: Supple, No JVD, Normal thyroid  NERVOUS SYSTEM:  Alert & Oriented X3, Good concentration; Motor Strength 5/5 B/L upper and lower extremities; DTRs 2+ intact and symmetric  CHEST/LUNG: Clear to percussion bilaterally; No rales, rhonchi, wheezing, or rubs  HEART: Regular rate and rhythm; No murmurs, rubs, or gallops  ABDOMEN: Soft, Nontender, Nondistended; Bowel sounds present  EXTREMITIES:  2+ Peripheral Pulses, No clubbing, cyanosis, or edema  LYMPH: No lymphadenopathy noted  SKIN: No rashes or lesions    LABS:    03-28    137  |  100  |  63<H>  ----------------------------<  250<H>  4.7   |  22  |  2.19<H>    Ca    9.4      28 Mar 2023 06:17  Phos  4.3     03-28  Mg     2.1     03-28          CAPILLARY BLOOD GLUCOSE      POCT Blood Glucose.: 299 mg/dL (28 Mar 2023 22:06)  POCT Blood Glucose.: 204 mg/dL (28 Mar 2023 17:27)  POCT Blood Glucose.: 279 mg/dL (28 Mar 2023 11:28)  POCT Blood Glucose.: 245 mg/dL (28 Mar 2023 07:46)            MEDICATIONS  (STANDING):  apixaban 5 milliGRAM(s) Oral two times a day  atorvastatin 80 milliGRAM(s) Oral at bedtime  dextrose 5%. 1000 milliLiter(s) (100 mL/Hr) IV Continuous <Continuous>  dextrose 5%. 1000 milliLiter(s) (50 mL/Hr) IV Continuous <Continuous>  dextrose 50% Injectable 25 Gram(s) IV Push once  dextrose 50% Injectable 12.5 Gram(s) IV Push once  dextrose 50% Injectable 25 Gram(s) IV Push once  furosemide   Injectable 40 milliGRAM(s) IV Push two times a day  glucagon  Injectable 1 milliGRAM(s) IntraMuscular once  insulin glargine Injectable (LANTUS) 20 Unit(s) SubCutaneous at bedtime  insulin lispro (ADMELOG) corrective regimen sliding scale   SubCutaneous three times a day before meals  insulin lispro Injectable (ADMELOG) 8 Unit(s) SubCutaneous before breakfast  insulin lispro Injectable (ADMELOG) 8 Unit(s) SubCutaneous before lunch  insulin lispro Injectable (ADMELOG) 8 Unit(s) SubCutaneous before dinner  methylPREDNISolone sodium succinate Injectable 20 milliGRAM(s) IV Push every 8 hours  metoprolol succinate  milliGRAM(s) Oral daily  pantoprazole    Tablet 40 milliGRAM(s) Oral before breakfast    MEDICATIONS  (PRN):  albuterol/ipratropium for Nebulization 3 milliLiter(s) Nebulizer every 6 hours PRN Shortness of Breath and/or Wheezing  dextrose Oral Gel 15 Gram(s) Oral once PRN Blood Glucose LESS THAN 70 milliGRAM(s)/deciliter  guaiFENesin Oral Liquid (Sugar-Free) 200 milliGRAM(s) Oral every 6 hours PRN Cough      Care Discussed with Consultants/Other Providers [ ] YES  [ ] NO
DATE OF SERVICE: 03-29-23 @ 14:22    Patient is a 72y old  Male who presents with a chief complaint of SOB (28 Mar 2023 21:29)      INTERVAL HISTORY: Feels ok.     REVIEW OF SYSTEMS:  CONSTITUTIONAL: No weakness  EYES/ENT: No visual changes;  No throat pain   NECK: No pain or stiffness  RESPIRATORY: No cough, wheezing; No shortness of breath  CARDIOVASCULAR: No chest pain or palpitations  GASTROINTESTINAL: No abdominal  pain. No nausea, vomiting, or hematemesis  GENITOURINARY: No dysuria, frequency or hematuria  NEUROLOGICAL: No stroke like symptoms  SKIN: No rashes    TELEMETRY Personally reviewed: SB/SR 5-=8- PVCs  	  MEDICATIONS:  furosemide   Injectable 40 milliGRAM(s) IV Push two times a day  metoprolol succinate  milliGRAM(s) Oral daily        PHYSICAL EXAM:  T(C): 36.5 (03-29-23 @ 10:50), Max: 36.5 (03-29-23 @ 10:50)  HR: 63 (03-29-23 @ 10:50) (62 - 68)  BP: 145/89 (03-29-23 @ 10:50) (144/84 - 169/91)  RR: 18 (03-29-23 @ 10:50) (18 - 18)  SpO2: 96% (03-29-23 @ 10:50) (96% - 97%)  Wt(kg): --  I&O's Summary    28 Mar 2023 07:01  -  29 Mar 2023 07:00  --------------------------------------------------------  IN: 840 mL / OUT: 0 mL / NET: 840 mL    29 Mar 2023 07:01  -  29 Mar 2023 14:22  --------------------------------------------------------  IN: 360 mL / OUT: 0 mL / NET: 360 mL          Appearance: In no distress	  HEENT:    PERRL, EOMI	  Cardiovascular:  S1 S2, No JVD  Respiratory: diminished RLL  Gastrointestinal:  Soft, Non-tender, + BS	  Vascularature:  No edema of LE  Psychiatric: Appropriate affect   Neuro: no acute focal deficits           03-29    135  |  99  |  69<H>  ----------------------------<  318<H>  4.6   |  21<L>  |  2.11<H>    Ca    9.5      29 Mar 2023 06:17  Phos  4.3     03-28  Mg     2.4     03-29          Labs personally reviewed      ASSESSMENT/PLAN: 	  73 y/o with HTN, HLD, DM2, mild-mod non-obstructive CAD and HFpEF who presents with cough for 5 days and new onset SOB and cough for 5 days. He denies headache, throat pain, chest pain, dizziness, nausea, vomiting, diarrhea. Denies new lower extremity swelling.    Problem/Plan -1  Problem: Shortness of Breath  - ECG at baseline (twi I, aVL with incomplete LBBB)  - Trop 94 --> 94  - proBNP 1506 (downtrending and lower than previous admisson)  - + hMPV  - TTE shows EF 63%, no WMA, severe diastolic dysfunction with elevated LA filling pressures, severe pHT and NO pericardial effusion (EF now improved from 2022)  - Amyloid scan in 11/22  strongly suggestive of transthyretin cardiac amyloidosis.  - Patient does not appear grossly overloaded. Currently denies SOB.  - SOB likely d/t viral PNA and ADHF  - Will transition to Lasix 40mg PO daily for DC.     Problem/Plan -2  Problem: Heart Failure with preserved Ejection Fraction  - TTE shows EF 63%, no WMA, severe diastolic dysfunction with elevated LA filling pressures, severe pHT and NO pericardial effusion (EF now improved from 2022)  - proBNP 1506  - Patient does not appear volume overloaded.   -- Will transition to Lasix 40mg PO daily for DC.     Problem/Plan -3  Problem: Cardiac Amyloid  - OP follow up for initiation of treatment    Problem/Plan -4  Problem: Coronary Artery Disease  - Denies CP or active SOB  - Trop elevated but possibly d/t demand i/s/o ADHF exacerbation  - Recent cath revealed mild-mod non-obstructive RCA disease  - c/w ASA, atorvastatin 80mg, Metoprolol 100mg     Problem/Plan -5  Problem: Chronic Atrial Fibrillation  - c/w eliquis 5mg PO BID  - c/w Metoprolol 100mg PO daily    Problem/Plan -5  Problem: Hyperlipidemia  - c/w Atorvastatin 80 and ezetimibe 10mg PO daily      Close OP follow up with Dr Caryl Lemos, ELMO-NP   Shade Melton DO [facility]  Cardiovascular Medicine  [address] [place] [address], [address]  Available through call or text on [employer]  Office: [phone]  
[facility]   MD YAKELIN MCCLURE MD BRIANA PETITO, NP    TEL:  FROM 9 AM to 5 PM ---OFFICE: [phone]    FROM 5 PM - 9 AM PLEASE CALL ANSWERING SERVICE: [phone]     RENAL PROGRESS NOTE: DATE OF SERVICE 03-29-23 @ 16:42    Patient is a 72y old  Male who presents with a chief complaint of SOB       Patient seen and examined at bedside. No chest pain/sob    VITALS:  T(F): 97.7 (03-29-23 @ 10:50), Max: 97.7 (03-29-23 @ 10:50)  HR: 63 (03-29-23 @ 10:50)  BP: 145/89 (03-29-23 @ 10:50)  RR: 18 (03-29-23 @ 10:50)  SpO2: 96% (03-29-23 @ 10:50)  Wt(kg): --    03-28 @ 07:01  -  03-29 @ 07:00  --------------------------------------------------------  IN: 840 mL / OUT: 0 mL / NET: 840 mL    03-29 @ 07:01  -  03-29 @ 16:42  --------------------------------------------------------  IN: 360 mL / OUT: 0 mL / NET: 360 mL          PHYSICAL EXAM:  Constitutional: NAD  Neck: No JVD  Respiratory:  diminished  to R lower lung field   Cardiovascular: S1, S2, RRR  Gastrointestinal: BS+, soft, NT/ND  Extremities: No peripheral edema    Hospital Medications:   MEDICATIONS  (STANDING):  apixaban 5 milliGRAM(s) Oral two times a day  atorvastatin 80 milliGRAM(s) Oral at bedtime  dextrose 5%. 1000 milliLiter(s) (100 mL/Hr) IV Continuous <Continuous>  dextrose 5%. 1000 milliLiter(s) (50 mL/Hr) IV Continuous <Continuous>  dextrose 50% Injectable 25 Gram(s) IV Push once  dextrose 50% Injectable 12.5 Gram(s) IV Push once  dextrose 50% Injectable 25 Gram(s) IV Push once  glucagon  Injectable 1 milliGRAM(s) IntraMuscular once  insulin glargine Injectable (LANTUS) 20 Unit(s) SubCutaneous at bedtime  insulin lispro (ADMELOG) corrective regimen sliding scale   SubCutaneous three times a day before meals  insulin lispro Injectable (ADMELOG) 8 Unit(s) SubCutaneous before breakfast  insulin lispro Injectable (ADMELOG) 8 Unit(s) SubCutaneous before lunch  insulin lispro Injectable (ADMELOG) 8 Unit(s) SubCutaneous before dinner  metoprolol succinate  milliGRAM(s) Oral daily  pantoprazole    Tablet 40 milliGRAM(s) Oral before breakfast      LABS:  03-29    135  |  99  |  69<H>  ----------------------------<  318<H>  4.6   |  21<L>  |  2.11<H>    Ca    9.5      29 Mar 2023 06:17  Phos  4.3     03-28  Mg     2.4     03-29      Creatinine Trend: 2.11 <--, 2.19 <--, 2.05 <--, 1.73 <--, 1.91 <--, 1.84 <--            Urine Studies:      TSH 2.19      [12-06-22 @ 06:21]  Lipid: chol 153, , HDL 36, LDL --      [12-04-22 @ 05:51]        RADIOLOGY & ADDITIONAL STUDIES:  
Date of Service: 03-27-23 @ 16:39    Patient is a 72y old  Male who presents with a chief complaint of SOB (27 Mar 2023 11:21)      Any change in ROS:  doing same:  no sob:  wheezing is better;  off oxygen      MEDICATIONS  (STANDING):  apixaban 5 milliGRAM(s) Oral two times a day  atorvastatin 80 milliGRAM(s) Oral at bedtime  dextrose 5%. 1000 milliLiter(s) (100 mL/Hr) IV Continuous <Continuous>  dextrose 5%. 1000 milliLiter(s) (50 mL/Hr) IV Continuous <Continuous>  dextrose 50% Injectable 25 Gram(s) IV Push once  dextrose 50% Injectable 12.5 Gram(s) IV Push once  dextrose 50% Injectable 25 Gram(s) IV Push once  furosemide   Injectable 40 milliGRAM(s) IV Push two times a day  glucagon  Injectable 1 milliGRAM(s) IntraMuscular once  insulin glargine Injectable (LANTUS) 20 Unit(s) SubCutaneous at bedtime  insulin lispro (ADMELOG) corrective regimen sliding scale   SubCutaneous three times a day before meals  insulin lispro Injectable (ADMELOG) 8 Unit(s) SubCutaneous before dinner  methylPREDNISolone sodium succinate Injectable 20 milliGRAM(s) IV Push every 8 hours  metoprolol succinate  milliGRAM(s) Oral daily  pantoprazole    Tablet 40 milliGRAM(s) Oral before breakfast    MEDICATIONS  (PRN):  albuterol/ipratropium for Nebulization 3 milliLiter(s) Nebulizer every 6 hours PRN Shortness of Breath and/or Wheezing  dextrose Oral Gel 15 Gram(s) Oral once PRN Blood Glucose LESS THAN 70 milliGRAM(s)/deciliter  guaiFENesin Oral Liquid (Sugar-Free) 200 milliGRAM(s) Oral every 6 hours PRN Cough    Vital Signs Last 24 Hrs  T(C): 36.3 (27 Mar 2023 13:01), Max: 37 (26 Mar 2023 20:17)  T(F): 97.4 (27 Mar 2023 13:01), Max: 98.6 (26 Mar 2023 20:17)  HR: 66 (27 Mar 2023 13:01) (63 - 77)  BP: 163/95 (27 Mar 2023 13:01) (128/76 - 163/95)  BP(mean): --  RR: 18 (27 Mar 2023 13:01) (18 - 18)  SpO2: 96% (27 Mar 2023 05:09) (95% - 96%)    Parameters below as of 27 Mar 2023 13:01  Patient On (Oxygen Delivery Method): room air        I&O's Summary    26 Mar 2023 07:01  -  27 Mar 2023 07:00  --------------------------------------------------------  IN: 1200 mL / OUT: 400 mL / NET: 800 mL    27 Mar 2023 07:01  -  27 Mar 2023 16:39  --------------------------------------------------------  IN: 380 mL / OUT: 0 mL / NET: 380 mL          Physical Exam:   GENERAL: NAD, well-groomed, well-developed  HEENT: TIERA/   Atraumatic, Normocephalic  ENMT: No tonsillar erythema, exudates, or enlargement; Moist mucous membranes, Good dentition, No lesions  NECK: Supple, No JVD, Normal thyroid  CHEST/LUNG: mild wheezing  CVS: Regular rate and rhythm; No murmurs, rubs, or gallops  GI: : Soft, Nontender, Nondistended; Bowel sounds present  NERVOUS SYSTEM:  Alert & Oriented X3  EXTREMITIES:  2+ Peripheral Pulses, No clubbing, cyanosis, or edema  LYMPH: No lymphadenopathy noted  SKIN: No rashes or lesions  ENDOCRINOLOGY: No Thyromegaly  PSYCH: Appropriate    Labs:                              13.0   3.68  )-----------( 177      ( 26 Mar 2023 06:01 )             40.7                         14.1   5.22  )-----------( 203      ( 24 Mar 2023 18:20 )             44.4     03-27    139  |  102  |  49<H>  ----------------------------<  294<H>  4.6   |  24  |  2.05<H>  03-26    141  |  105  |  34<H>  ----------------------------<  190<H>  4.5   |  24  |  1.73<H>  03-25    139  |  102  |  31<H>  ----------------------------<  215<H>  4.5   |  25  |  1.91<H>  03-24    140  |  102  |  28<H>  ----------------------------<  175<H>  4.3   |  26  |  1.84<H>    Ca    10.3      27 Mar 2023 06:23  Ca    9.3      26 Mar 2023 06:01  Phos  4.9     03-27  Mg     2.2     03-27  Mg     2.1     03-26    TPro  6.8  /  Alb  3.7  /  TBili  0.4  /  DBili  x   /  AST  18  /  ALT  20  /  AlkPhos  44  03-25  TPro  7.8  /  Alb  4.3  /  TBili  0.4  /  DBili  x   /  AST  25  /  ALT  25  /  AlkPhos  53  03-24    CAPILLARY BLOOD GLUCOSE      POCT Blood Glucose.: 413 mg/dL (27 Mar 2023 12:25)  POCT Blood Glucose.: 292 mg/dL (27 Mar 2023 08:44)  POCT Blood Glucose.: 219 mg/dL (26 Mar 2023 22:31)  POCT Blood Glucose.: 167 mg/dL (26 Mar 2023 16:58)        rad< from: Xray Chest 2 Views PA/Lat (03.24.23 @ 17:43) >    ACC: [id number] EXAM:  XR CHEST PA LAT 2V   ORDERED BY: ANAY MIRAMONTES     *** ADDENDUM # 1 ***    There is a chronic, linear-type peribronchial calcification in the right   middle lobe region. It measures 2.0cm in length and 2mm in thickness.   This is a benign-type calcifications. It is stable and unchanged since   11/30/22 exam.  No rounded subcentimeter right midlung opacities are seen.  This represents a change from the original interpretation and was   electronically sent to ER by Dr. Rocío abut 8:48AM pm 3/25/23.  --- End of Report ---    < end of copied text >            RECENT CULTURES:        RESPIRATORY CULTURES:          Studies  Chest X-RAY  CT SCAN Chest   Venous Dopplers: LE:   CT Abdomen  Others              
[facility] [place] [facility]   MD YAKELIN MCCLURE MD BRIANA PETITO, NP    TEL:  FROM 9 AM to 5 PM ---OFFICE: [phone]    FROM 5 PM - 9 AM PLEASE CALL ANSWERING SERVICE: [phone]       RENAL PROGRESS NOTE: DATE OF SERVICE 03-28-23 @ 11:07    Patient is a 72y old  Male who presents with a chief complaint of SOB      Patient seen and examined at bedside. No chest pain/sob    VITALS:  T(F): 97.6 (03-28-23 @ 05:05), Max: 98.6 (03-27-23 @ 20:41)  HR: 71 (03-28-23 @ 05:05)  BP: 153/80 (03-28-23 @ 05:05)  RR: 18 (03-28-23 @ 05:05)  SpO2: 97% (03-28-23 @ 05:05)  Wt(kg): --    03-27 @ 07:01  -  03-28 @ 07:00  --------------------------------------------------------  IN: 740 mL / OUT: 0 mL / NET: 740 mL    03-28 @ 07:01  -  03-28 @ 11:08  --------------------------------------------------------  IN: 240 mL / OUT: 0 mL / NET: 240 mL          PHYSICAL EXAM:  Constitutional: NAD  Neck: No JVD  Respiratory: diminished bilaterally   Cardiovascular: S1, S2, RRR  Gastrointestinal: BS+, firm, NT/ND  Extremities: No peripheral edema    Hospital Medications:   MEDICATIONS  (STANDING):  apixaban 5 milliGRAM(s) Oral two times a day  atorvastatin 80 milliGRAM(s) Oral at bedtime  dextrose 5%. 1000 milliLiter(s) (100 mL/Hr) IV Continuous <Continuous>  dextrose 5%. 1000 milliLiter(s) (50 mL/Hr) IV Continuous <Continuous>  dextrose 50% Injectable 25 Gram(s) IV Push once  dextrose 50% Injectable 12.5 Gram(s) IV Push once  dextrose 50% Injectable 25 Gram(s) IV Push once  furosemide   Injectable 40 milliGRAM(s) IV Push two times a day  glucagon  Injectable 1 milliGRAM(s) IntraMuscular once  insulin glargine Injectable (LANTUS) 20 Unit(s) SubCutaneous at bedtime  insulin lispro (ADMELOG) corrective regimen sliding scale   SubCutaneous three times a day before meals  insulin lispro Injectable (ADMELOG) 8 Unit(s) SubCutaneous before breakfast  insulin lispro Injectable (ADMELOG) 8 Unit(s) SubCutaneous before lunch  insulin lispro Injectable (ADMELOG) 8 Unit(s) SubCutaneous before dinner  methylPREDNISolone sodium succinate Injectable 20 milliGRAM(s) IV Push every 8 hours  metoprolol succinate  milliGRAM(s) Oral daily  pantoprazole    Tablet 40 milliGRAM(s) Oral before breakfast      LABS:  03-28    137  |  100  |  63<H>  ----------------------------<  250<H>  4.7   |  22  |  2.19<H>    Ca    9.4      28 Mar 2023 06:17  Phos  4.3     03-28  Mg     2.1     03-28      Creatinine Trend: 2.19 <--, 2.05 <--, 1.73 <--, 1.91 <--, 1.84 <--    Phosphorus Level, Serum: 4.3 mg/dL (03-28 @ 06:17)          Urine Studies:      TSH 2.19      [12-06-22 @ 06:21]  Lipid: chol 153, , HDL 36, LDL --      [12-04-22 @ 05:51]        RADIOLOGY & ADDITIONAL STUDIES:  
Patient is a 72y old  Male who presents with a chief complaint of SOB (27 Mar 2023 16:39)      INTERVAL HPI/OVERNIGHT EVENTS: doing better , off oxygen   T(C): 37 (03-27-23 @ 20:41), Max: 37 (03-27-23 @ 20:41)  HR: 74 (03-27-23 @ 20:41) (65 - 74)  BP: 166/96 (03-27-23 @ 20:41) (128/76 - 166/96)  RR: 20 (03-27-23 @ 20:41) (18 - 20)  SpO2: 98% (03-27-23 @ 20:41) (96% - 98%)  Wt(kg): --  I&O's Summary    26 Mar 2023 07:01  -  27 Mar 2023 07:00  --------------------------------------------------------  IN: 1200 mL / OUT: 400 mL / NET: 800 mL    27 Mar 2023 07:01  -  28 Mar 2023 04:36  --------------------------------------------------------  IN: 740 mL / OUT: 0 mL / NET: 740 mL        PAST MEDICAL & SURGICAL HISTORY:  Diabetes insipidus      Hypertension      Diabetes      No significant past surgical history          SOCIAL HISTORY  Alcohol:  Tobacco:  Illicit substance use:    FAMILY HISTORY:    REVIEW OF SYSTEMS:  CONSTITUTIONAL: No fever, weight loss, or fatigue  EYES: No eye pain, visual disturbances, or discharge  ENMT:  No difficulty hearing, tinnitus, vertigo; No sinus or throat pain  NECK: No pain or stiffness  RESPIRATORY: No cough, wheezing, chills or hemoptysis; No shortness of breath  CARDIOVASCULAR: No chest pain, palpitations, dizziness, or leg swelling  GASTROINTESTINAL: No abdominal or epigastric pain. No nausea, vomiting, or hematemesis; No diarrhea or constipation. No melena or hematochezia.  GENITOURINARY: No dysuria, frequency, hematuria, or incontinence  NEUROLOGICAL: No headaches, memory loss, loss of strength, numbness, or tremors  SKIN: No itching, burning, rashes, or lesions   LYMPH NODES: No enlarged glands  ENDOCRINE: No heat or cold intolerance; No hair loss  MUSCULOSKELETAL: No joint pain or swelling; No muscle, back, or extremity pain  PSYCHIATRIC: No depression, anxiety, mood swings, or difficulty sleeping  HEME/LYMPH: No easy bruising, or bleeding gums  ALLERY AND IMMUNOLOGIC: No hives or eczema    RADIOLOGY & ADDITIONAL TESTS:    Imaging Personally Reviewed:  [ ] YES  [ ] NO    Consultant(s) Notes Reviewed:  [ ] YES  [ ] NO    PHYSICAL EXAM:  GENERAL: NAD, well-groomed, well-developed  HEAD:  Atraumatic, Normocephalic  EYES: EOMI, PERRLA, conjunctiva and sclera clear  ENMT: No tonsillar erythema, exudates, or enlargement; Moist mucous membranes, Good dentition, No lesions  NECK: Supple, No JVD, Normal thyroid  NERVOUS SYSTEM:  Alert & Oriented X3, Good concentration; Motor Strength 5/5 B/L upper and lower extremities; DTRs 2+ intact and symmetric  CHEST/LUNG: Clear to percussion bilaterally; No rales, rhonchi, wheezing, or rubs  HEART: Regular rate and rhythm; No murmurs, rubs, or gallops  ABDOMEN: Soft, Nontender, Nondistended; Bowel sounds present  EXTREMITIES:  2+ Peripheral Pulses, No clubbing, cyanosis, or edema  LYMPH: No lymphadenopathy noted  SKIN: No rashes or lesions    LABS:                        13.0   3.68  )-----------( 177      ( 26 Mar 2023 06:01 )             40.7     03-27    139  |  102  |  49<H>  ----------------------------<  294<H>  4.6   |  24  |  2.05<H>    Ca    10.3      27 Mar 2023 06:23  Phos  4.9     03-27  Mg     2.2     03-27          CAPILLARY BLOOD GLUCOSE      POCT Blood Glucose.: 338 mg/dL (27 Mar 2023 21:58)  POCT Blood Glucose.: 342 mg/dL (27 Mar 2023 17:11)  POCT Blood Glucose.: 413 mg/dL (27 Mar 2023 12:25)  POCT Blood Glucose.: 292 mg/dL (27 Mar 2023 08:44)            MEDICATIONS  (STANDING):  apixaban 5 milliGRAM(s) Oral two times a day  atorvastatin 80 milliGRAM(s) Oral at bedtime  dextrose 5%. 1000 milliLiter(s) (100 mL/Hr) IV Continuous <Continuous>  dextrose 5%. 1000 milliLiter(s) (50 mL/Hr) IV Continuous <Continuous>  dextrose 50% Injectable 25 Gram(s) IV Push once  dextrose 50% Injectable 12.5 Gram(s) IV Push once  dextrose 50% Injectable 25 Gram(s) IV Push once  furosemide   Injectable 40 milliGRAM(s) IV Push two times a day  glucagon  Injectable 1 milliGRAM(s) IntraMuscular once  insulin glargine Injectable (LANTUS) 20 Unit(s) SubCutaneous at bedtime  insulin lispro (ADMELOG) corrective regimen sliding scale   SubCutaneous three times a day before meals  insulin lispro Injectable (ADMELOG) 8 Unit(s) SubCutaneous before breakfast  insulin lispro Injectable (ADMELOG) 8 Unit(s) SubCutaneous before lunch  insulin lispro Injectable (ADMELOG) 8 Unit(s) SubCutaneous before dinner  methylPREDNISolone sodium succinate Injectable 20 milliGRAM(s) IV Push every 8 hours  metoprolol succinate  milliGRAM(s) Oral daily  pantoprazole    Tablet 40 milliGRAM(s) Oral before breakfast    MEDICATIONS  (PRN):  albuterol/ipratropium for Nebulization 3 milliLiter(s) Nebulizer every 6 hours PRN Shortness of Breath and/or Wheezing  dextrose Oral Gel 15 Gram(s) Oral once PRN Blood Glucose LESS THAN 70 milliGRAM(s)/deciliter  guaiFENesin Oral Liquid (Sugar-Free) 200 milliGRAM(s) Oral every 6 hours PRN Cough      Care Discussed with Consultants/Other Providers [ ] YES  [ ] NO
[facility] [place] [facility]   MD YAKEILN MCCLURE MD BRIANA PETITO, NP    TEL:  FROM 9 AM to 5 PM ---OFFICE: [phone]    FROM 5 PM - 9 AM PLEASE CALL ANSWERING SERVICE: [phone]       RENAL PROGRESS NOTE: DATE OF SERVICE 03-27-23 @ 11:22    Patient is a 72y old  Male who presents with a chief complaint of SOB     Patient seen and examined at bedside. No chest pain/sob    VITALS:  T(F): 97.8 (03-27-23 @ 05:09), Max: 98.6 (03-26-23 @ 20:17)  HR: 65 (03-27-23 @ 05:09)  BP: 128/76 (03-27-23 @ 05:09)  RR: 18 (03-27-23 @ 05:09)  SpO2: 96% (03-27-23 @ 05:09)  Wt(kg): --    03-26 @ 07:01  -  03-27 @ 07:00  --------------------------------------------------------  IN: 1200 mL / OUT: 400 mL / NET: 800 mL          PHYSICAL EXAM:  Constitutional: NAD  Neck: No JVD  Respiratory: CTAB, no wheezes, rales or rhonchi  Cardiovascular: S1, S2, RRR  Gastrointestinal: BS+, soft, NT/ND  Extremities: No peripheral edema    Hospital Medications:   MEDICATIONS  (STANDING):  apixaban 5 milliGRAM(s) Oral two times a day  atorvastatin 80 milliGRAM(s) Oral at bedtime  dextrose 5%. 1000 milliLiter(s) (100 mL/Hr) IV Continuous <Continuous>  dextrose 5%. 1000 milliLiter(s) (50 mL/Hr) IV Continuous <Continuous>  dextrose 50% Injectable 25 Gram(s) IV Push once  dextrose 50% Injectable 12.5 Gram(s) IV Push once  dextrose 50% Injectable 25 Gram(s) IV Push once  furosemide   Injectable 40 milliGRAM(s) IV Push two times a day  glucagon  Injectable 1 milliGRAM(s) IntraMuscular once  insulin glargine Injectable (LANTUS) 20 Unit(s) SubCutaneous at bedtime  insulin lispro (ADMELOG) corrective regimen sliding scale   SubCutaneous three times a day before meals  insulin lispro Injectable (ADMELOG) 5 Unit(s) SubCutaneous before breakfast  insulin lispro Injectable (ADMELOG) 5 Unit(s) SubCutaneous before lunch  insulin lispro Injectable (ADMELOG) 5 Unit(s) SubCutaneous before dinner  methylPREDNISolone sodium succinate Injectable 20 milliGRAM(s) IV Push every 8 hours  metoprolol succinate  milliGRAM(s) Oral daily  pantoprazole    Tablet 40 milliGRAM(s) Oral before breakfast      LABS:  03-27    139  |  102  |  49<H>  ----------------------------<  294<H>  4.6   |  24  |  2.05<H>    Ca    10.3      27 Mar 2023 06:23  Phos  4.9     03-27  Mg     2.2     03-27      Creatinine Trend: 2.05 <--, 1.73 <--, 1.91 <--, 1.84 <--    Phosphorus Level, Serum: 4.9 mg/dL (03-27 @ 06:23)                              13.0   3.68  )-----------( 177      ( 26 Mar 2023 06:01 )             40.7     Urine Studies:      TSH 2.19      [12-06-22 @ 06:21]  Lipid: chol 153, , HDL 36, LDL --      [12-04-22 @ 05:51]        RADIOLOGY & ADDITIONAL STUDIES:

## 2023-03-29 NOTE — PROGRESS NOTE ADULT - PROVIDER SPECIALTY LIST ADULT
Cardiology
Internal Medicine
Internal Medicine
Nephrology
Pulmonology
Pulmonology
Nephrology
Nephrology
Internal Medicine
Pulmonology

## 2023-03-29 NOTE — DISCHARGE NOTE PROVIDER - NSDCFUADDAPPT_GEN_ALL_CORE_FT
APPTS ARE READY TO BE MADE: [ x] YES    Best Family or Patient Contact (if needed):    Additional Information about above appointments (if needed):    1:   2:   3:     Other comments or requests:    APPTS ARE READY TO BE MADE: [ x] YES    Best Family or Patient Contact (if needed):    Additional Information about above appointments (if needed):    1:   2:   3:     Other comments or requests:       Patient was previously scheduled with Dr. Mohseni

## 2023-03-29 NOTE — DISCHARGE NOTE PROVIDER - NSDCCPCAREPLAN_GEN_ALL_CORE_FT
PRINCIPAL DISCHARGE DIAGNOSIS  Diagnosis: Congestive heart failure  Assessment and Plan of Treatment: Weigh yourself daily.  If you gain 3lbs in 3 days, or 5lbs in a week call your Health Care Provider.  Do not eat or drink foods containing more than 2000mg of salt (sodium) in your diet every day.  Call your Health Care Provider if you have any swelling or increased swelling in your feet, ankles, and/or stomach.  Take all of your medication as directed.  If you become dizzy call your Health Care Provider.      SECONDARY DISCHARGE DIAGNOSES  Diagnosis: Acute bronchitis due to human metapneumovirus  Assessment and Plan of Treatment: s/p Wilfred

## 2023-03-29 NOTE — DISCHARGE NOTE NURSING/CASE MANAGEMENT/SOCIAL WORK - NSDCPEFALRISK_GEN_ALL_CORE
For information on Fall & Injury Prevention, visit: [email or web address] OR  [email or web address] OR  https://www.cdc.gov/steadi/patient.html

## 2023-03-29 NOTE — DISCHARGE NOTE PROVIDER - NSDCFUSCHEDAPPT_GEN_ALL_CORE_FT
Caryl Delvalle Physician [facility]  CARDIOLOGY [address]  Scheduled Appointment: 05/23/2023

## 2023-03-29 NOTE — DISCHARGE NOTE NURSING/CASE MANAGEMENT/SOCIAL WORK - PATIENT PORTAL LINK FT
You can access the FollowMyHealth Patient Portal offered by [facility] by registering at the following website: [email or web address]. By joining [employer]’s FollowMyHealth portal, you will also be able to view your health information using other applications (apps) compatible with our system.

## 2023-03-29 NOTE — DISCHARGE NOTE PROVIDER - CARE PROVIDER_API CALL
Mohseni, Haleh G (MD)  Family Medicine  [address] [place] [address]  Phone: [phone]  Fax: [phone]  Follow Up Time: 2 weeks

## 2023-03-29 NOTE — PROGRESS NOTE ADULT - ASSESSMENT
A/P : 73 y/o  with HTN, HLD, DM2, and Congestive heart failure here with cough for 5 days and new onset SOB. Patient reports he has a wet cough for last 5 days and overnight last evening had worsening shortness of breath. He reports being compliant with home medications, taking 40mg of Lasix today. He denies headache, throat pain, chest pain, dizziness, nausea, vomiting, diarrhea. Denies new lower extremity swelling.  in cdu, pt with continued sob/dyspnea, little exertion causes pt to be very sob, +coughing oxygen 93% on RA, requiring multiple nebs, cards called for possible CHF exacerbation, pt admitted to medicine. (25 Mar 2023 15:57)     he says he has no underlying lung disease:  + sob: +cough: : he has viral illness:  he has metapneumovirus infection:     CHF exacerbation : ac on ch diastolic failure : echo done : shows sever left ventricular hypertrophy and grade 3 diastolic failure : c/w lasix  : c/w eliquis : not sure why pt. is on AC     Pul HTN :  pa pressure is 73 mm hg : has grade 3 diastolic dysfunction : ? has underlying sleep apnea:      Viral PNA: start steroids and bd:  he is wheezing a lot      DM-1 : monitor and control     HTN : controlled    CKD stage 3  : stable: monitro creat: :    dvt prophylaxis:    dw acp           3/27:  CHF exacerbation : ac on ch diastolic failure : echo done : shows sever left ventricular hypertrophy and grade 3 diastolic failure : c/w lasix  : c/w eliquis :     Pul HTN :  pa pressure is 73 mm hg : has grade 3 diastolic dysfunction : ? has underlying sleep apnea:      Viral PNA: cont steroids  : wheezing is better:  decrease in AM  : ct scan chest showed: There is a chronic, linear-type peribronchial calcification in the right  middle lobe region. It measures 2.0cm in length and 2mm in thickness. This is a benign-type calcifications. It is stable and unchanged since 11/30/22 exam. No rounded subcentimeter right midlung opacities are seen.    DM-1 : monitor and control     HTN : controlled    CKD stage 3  : stable: monitro creat: :    dvt prophylaxis:    dw acp     3/28:    CHF exacerbation : ac on ch diastolic failure : echo done : shows sever left ventricular hypertrophy and grade 3 diastolic failure : c/w lasix  : c/w eliquis :   Pul HTN :  pa pressure is 73 mm hg : has grade 3 diastolic dysfunction : ? has underlying sleep apnea:  needs psg as an outpt and pft   Viral PNA: cont steroids  : wheezing is better:  decrease in AM  : ct scan chest showed: There is a chronic, linear-type peribronchial calcification in the right  middle lobe region. It measures 2.0cm in length and 2mm in thickness. This is a benign-type calcifications. It is stable and unchanged since 11/30/22 exam. No rounded subcentimeter right midlung opacities are seen.  DM-1 : monitor and control   HTN : controlled  CKD stage 3  : stable: monitro creat: :    dvt prophylaxis:    dw acp     3/29:    CHF exacerbation : ac on ch diastolic failure : echo done : shows sever left ventricular hypertrophy and grade 3 diastolic failure : c/w lasix  : c/w eliquis :   Pul HTN :  pa pressure is 73 mm hg : has grade 3 diastolic dysfunction : ? has underlying sleep apnea:  needs psg as an outpt and pft  ; eventual rigt heart cath  if indicated:  must follow with pulm as anoutpt:   Viral PNA: cont steroids  : wheezing is better:  now change prednisone to 40 mg daily for 3 days only:  : ct scan chest showed: There is a chronic, linear-type peribronchial calcification in the right  middle lobe region. It measures 2.0cm in length and 2mm in thickness. This is a benign-type calcifications. It is stable and unchanged since 11/30/22 exam. No rounded subcentimeter right midlung opacities are seen.  DM-1 : monitor and control   HTN : controlled  CKD stage 3  : stable: monitro creat: :    dvt prophylaxis:    dw acp

## 2023-03-29 NOTE — DISCHARGE NOTE PROVIDER - NSDCMRMEDTOKEN_GEN_ALL_CORE_FT
alcohol swabs : Apply topically to affected area 4 times a day   atorvastatin 80 mg oral tablet: 1 tab(s) orally once a day (at bedtime)  Eliquis 5 mg oral tablet: 1 tab(s) orally 2 times a day   ezetimibe 10 mg oral tablet: 1 tab(s) orally once a day  glucometer (per patient&#x27;s insurance): Test blood sugars four times a day. Dispense #1 glucometer.  glucose tablets: Follow instructions on bottle when sugar is low.  guaiFENesin 100 mg/5 mL oral liquid: 10 milliliter(s) orally every 6 hours As needed Cough  Insulin Pen Needles, 4mm: 1 application subcutaneously 4 times a day. ** Use with insulin pen **   lancets: 1 application subcutaneously 4 times a day   metoprolol succinate 100 mg oral tablet, extended release: 1 tab(s) orally once a day

## 2023-03-29 NOTE — DISCHARGE NOTE NURSING/CASE MANAGEMENT/SOCIAL WORK - NSDCPEWEB_GEN_ALL_CORE
[facility] for Tobacco Control website --- [email or web address] --- [email or web address]

## 2023-05-20 NOTE — PHYSICAL EXAM
[Well Developed] : well developed [Well Nourished] : well nourished [No Acute Distress] : no acute distress [Normal Venous Pressure] : normal venous pressure [Normal Conjunctiva] : normal conjunctiva [No Carotid Bruit] : no carotid bruit [Normal S1, S2] : normal S1, S2 [No Murmur] : no murmur [No Rub] : no rub [No Gallop] : no gallop [Clear Lung Fields] : clear lung fields [No Respiratory Distress] : no respiratory distress  [Good Air Entry] : good air entry [Soft] : abdomen soft [Non Tender] : non-tender [No Masses/organomegaly] : no masses/organomegaly [Normal Bowel Sounds] : normal bowel sounds [Normal Gait] : normal gait [No Clubbing] : no clubbing [No Cyanosis] : no cyanosis [No Varicosities] : no varicosities [Edema ___] : edema [unfilled] [No Rash] : no rash [No Skin Lesions] : no skin lesions [Moves all extremities] : moves all extremities [No Focal Deficits] : no focal deficits [Normal Speech] : normal speech [Alert and Oriented] : alert and oriented [Normal memory] : normal memory

## 2023-05-23 ENCOUNTER — NON-APPOINTMENT (OUTPATIENT)
Age: 73
End: 2023-05-23

## 2023-05-23 ENCOUNTER — APPOINTMENT (OUTPATIENT)
Dept: CARDIOLOGY | Facility: CLINIC | Age: 73
End: 2023-05-23
Payer: MEDICARE

## 2023-05-23 VITALS
DIASTOLIC BLOOD PRESSURE: 89 MMHG | HEART RATE: 72 BPM | HEIGHT: 71 IN | SYSTOLIC BLOOD PRESSURE: 150 MMHG | BODY MASS INDEX: 33.32 KG/M2 | OXYGEN SATURATION: 99 % | WEIGHT: 238 LBS

## 2023-05-23 PROCEDURE — 93000 ELECTROCARDIOGRAM COMPLETE: CPT

## 2023-05-23 PROCEDURE — 99213 OFFICE O/P EST LOW 20 MIN: CPT | Mod: 25

## 2023-05-23 NOTE — DISCUSSION/SUMMARY
[FreeTextEntry1] : The patient is a 72-year-old gentleman DM, HTN, HLD, nonobstructive CAD,PAF,  HFrEF with EF=39% with LE edema .\par #1 CV- cardio-genetic results neg amyloid w/u, nonobstructive CAD\par #2 HFrEF- severe LVH with mod global LV dysfunction and EF=39%, c/w furosemide 40mg daily\par #3 PAF- now in sinus, c/w eliquis, c/w toprol 50mg \par #4 HTN- controlled on metoprolol for now\par #5 HLD- only on zetia\par #6 DM- on levemir and novolug, labs today,f/u Endo [EKG obtained to assist in diagnosis and management of assessed problem(s)] : EKG obtained to assist in diagnosis and management of assessed problem(s)

## 2023-05-24 LAB
ANION GAP SERPL CALC-SCNC: 15 MMOL/L
BUN SERPL-MCNC: 33 MG/DL
CALCIUM SERPL-MCNC: 9.3 MG/DL
CHLORIDE SERPL-SCNC: 102 MMOL/L
CO2 SERPL-SCNC: 20 MMOL/L
CREAT SERPL-MCNC: 2.06 MG/DL
EGFR: 34 ML/MIN/1.73M2
GLUCOSE SERPL-MCNC: 360 MG/DL
NT-PROBNP SERPL-MCNC: 1350 PG/ML
POTASSIUM SERPL-SCNC: 5.1 MMOL/L
SODIUM SERPL-SCNC: 138 MMOL/L

## 2023-06-05 NOTE — ED PROVIDER NOTE - CARDIOVASCULAR [-], MLM
LVM for pt and requested pt to call office back if she wants to discuss her symptoms further. Pt already has appt scheduled on 6/7/23 with Meghan Closser.   
PCP: GALO Pulido  Call back: [phone]    Triaged and declined urgent disposition. Advised ED for persistence and worsening. Relayed intent to keep appt with PCP Wednesday.     Onset: this AM  Location / description: right calf pain - constant  Precipitating Factors: this AM crossing the street to move care, felt and heard popped in calf and fell, denies injuries  Pain Scale 1-7/10  Associated Symptoms: limping a little  What improves / worsens symptoms: walk  Symptom specific medications: advil 1 hour prior to injury this AM for menstrual cramps  LMP : Patient's last menstrual period was 05/02/2023.  Are you pregnant or breast feeding: no  Recent visits (last 3-4 weeks) for same reason or recent surgery: yes    PLAN:   Directed to Urgent Care    Patient/Caller refuses to follow recommendations.    Reason for Disposition  • [1] Thigh or calf pain AND [2] only 1 side AND [3] present > 1 hour (Exception: chronic unchanged pain)    Protocols used: LEG PAIN-A-AH      
Regarding: [place] - [place] - Patient has injury on right calf muscle, said she heard a pop and is not able to walk  ----- Message from Anaid David sent at 6/5/2023  9:57 AM CDT -----  Patient Name: Cas Ambrose  Caller: Cas Ambrose  Call Back # : [phone]     Is this for an Active episode for Home Health, Hospice or Palliative Care? No   Specialist or PCP Name: Dr Meghan Closser  Symptoms: Patient has injury on right calf muscle, said she heard a pop and is not able to walk well, said pain is bearable 7/10  Pregnant (females aged 13-60. If Yes, how long?) : No  Name of Clinic Site / Acct# : [facility] - [address]       Are you currently at (or near) your place of residence? Yes [facility] [id number]     Use following scripting for patients waiting for a callback:   \"Nurse callback times vary based on call volumes; please be aware the return phone call may come from an unidentified or out of state phone number. If your symptoms worsen or become life threatening while waiting, you should seek immediate assistance by calling 911 or going to the ER for evaluation.\"    
no chest pain/no palpitations/no syncope

## 2023-06-27 ENCOUNTER — RX RENEWAL (OUTPATIENT)
Age: 73
End: 2023-06-27

## 2023-06-27 RX ORDER — EZETIMIBE 10 MG/1
10 TABLET ORAL
Qty: 90 | Refills: 1 | Status: ACTIVE | COMMUNITY
Start: 2022-12-29 | End: 1900-01-01

## 2023-09-26 ENCOUNTER — NON-APPOINTMENT (OUTPATIENT)
Age: 73
End: 2023-09-26

## 2023-09-26 ENCOUNTER — APPOINTMENT (OUTPATIENT)
Dept: CARDIOLOGY | Facility: CLINIC | Age: 73
End: 2023-09-26
Payer: MEDICARE

## 2023-09-26 VITALS — SYSTOLIC BLOOD PRESSURE: 152 MMHG | HEART RATE: 75 BPM | DIASTOLIC BLOOD PRESSURE: 87 MMHG | OXYGEN SATURATION: 96 %

## 2023-09-26 DIAGNOSIS — N40.1 BENIGN PROSTATIC HYPERPLASIA WITH LOWER URINARY TRACT SYMPMS: ICD-10-CM

## 2023-09-26 DIAGNOSIS — N13.8 BENIGN PROSTATIC HYPERPLASIA WITH LOWER URINARY TRACT SYMPMS: ICD-10-CM

## 2023-09-26 PROCEDURE — 99214 OFFICE O/P EST MOD 30 MIN: CPT | Mod: 25

## 2023-09-26 PROCEDURE — 93000 ELECTROCARDIOGRAM COMPLETE: CPT

## 2023-12-12 ENCOUNTER — APPOINTMENT (OUTPATIENT)
Dept: CARDIOLOGY | Facility: CLINIC | Age: 73
End: 2023-12-12
Payer: MEDICARE

## 2023-12-12 VITALS
OXYGEN SATURATION: 97 % | SYSTOLIC BLOOD PRESSURE: 149 MMHG | BODY MASS INDEX: 34.16 KG/M2 | DIASTOLIC BLOOD PRESSURE: 84 MMHG | HEART RATE: 86 BPM | HEIGHT: 71 IN | WEIGHT: 244 LBS

## 2023-12-12 PROCEDURE — 93000 ELECTROCARDIOGRAM COMPLETE: CPT

## 2023-12-12 PROCEDURE — 99214 OFFICE O/P EST MOD 30 MIN: CPT | Mod: 25

## 2024-01-01 NOTE — H&P ADULT - PSYCHIATRIC
Following closely with pediatric neurology at [facility], developmentally doing well   negative

## 2024-02-09 NOTE — PATIENT PROFILE ADULT - FUNCTIONAL ASSESSMENT - BASIC MOBILITY 6.
Luiz Penaloza    This is to inform you regarding your test result.    CT result is satisfactory     Sincerely,      Dr.Nasima Amalia MD,FACP         4 = No assist / stand by assistance

## 2024-03-10 NOTE — PHYSICAL EXAM
[Well Developed] : well developed [Well Nourished] : well nourished [No Acute Distress] : no acute distress [Normal Conjunctiva] : normal conjunctiva [Normal Venous Pressure] : normal venous pressure [No Carotid Bruit] : no carotid bruit [Normal S1, S2] : normal S1, S2 [No Murmur] : no murmur [No Gallop] : no gallop [No Rub] : no rub [Good Air Entry] : good air entry [Clear Lung Fields] : clear lung fields [Soft] : abdomen soft [No Respiratory Distress] : no respiratory distress  [Non Tender] : non-tender [Normal Bowel Sounds] : normal bowel sounds [No Masses/organomegaly] : no masses/organomegaly [No Cyanosis] : no cyanosis [Normal Gait] : normal gait [No Clubbing] : no clubbing [No Varicosities] : no varicosities [No Rash] : no rash [Edema ___] : edema [unfilled] [No Skin Lesions] : no skin lesions [Moves all extremities] : moves all extremities [No Focal Deficits] : no focal deficits [Normal Speech] : normal speech [Normal memory] : normal memory [Alert and Oriented] : alert and oriented

## 2024-03-12 ENCOUNTER — APPOINTMENT (OUTPATIENT)
Dept: CARDIOLOGY | Facility: CLINIC | Age: 74
End: 2024-03-12

## 2024-03-12 NOTE — REVIEW OF SYSTEMS
[SOB] : shortness of breath [Dyspnea on exertion] : dyspnea during exertion [Lower Ext Edema] : lower extremity edema [Negative] : Psychiatric [Chest Discomfort] : no chest discomfort

## 2024-03-12 NOTE — DISCUSSION/SUMMARY
[FreeTextEntry1] : The patient is a 73-year-old gentleman DM, HTN, HLD, nonobstructive CAD,PAF,  HFrEF with EF=39% whose edema improved but c/o leg increase girth. #1 CV- cardio-genetic results neg amyloid w/u, nonobstructive CAD #2 HFrEF- severe LVH with mod global LV dysfunction and EF=39%,c/w furosemide 40mg daily and obtain labs. #3 PAF- now in sinus, c/w eliquis, c/w toprol 50mg  #4 HTN- c/w metoprolol for now but may need  #5 HLD- c/w atorvastatin and  zetia #6 DM- on levemir and novolug, labs today,f/u Endo

## 2024-04-12 RX ORDER — FUROSEMIDE 40 MG/1
40 TABLET ORAL DAILY
Qty: 90 | Refills: 1 | Status: ACTIVE | COMMUNITY
Start: 2022-12-29 | End: 1900-01-01

## 2024-04-12 RX ORDER — INSULIN DETEMIR 100 [IU]/ML
INJECTION, SOLUTION SUBCUTANEOUS
Refills: 0 | Status: DISCONTINUED | COMMUNITY
End: 2024-04-12

## 2024-04-19 ENCOUNTER — NON-APPOINTMENT (OUTPATIENT)
Age: 74
End: 2024-04-19

## 2024-04-23 ENCOUNTER — APPOINTMENT (OUTPATIENT)
Dept: CARDIOLOGY | Facility: CLINIC | Age: 74
End: 2024-04-23
Payer: MEDICARE

## 2024-04-23 ENCOUNTER — NON-APPOINTMENT (OUTPATIENT)
Age: 74
End: 2024-04-23

## 2024-04-23 VITALS
HEIGHT: 71 IN | HEART RATE: 82 BPM | BODY MASS INDEX: 30.8 KG/M2 | OXYGEN SATURATION: 98 % | SYSTOLIC BLOOD PRESSURE: 137 MMHG | DIASTOLIC BLOOD PRESSURE: 87 MMHG | WEIGHT: 220 LBS

## 2024-04-23 DIAGNOSIS — I10 ESSENTIAL (PRIMARY) HYPERTENSION: ICD-10-CM

## 2024-04-23 DIAGNOSIS — I48.91 UNSPECIFIED ATRIAL FIBRILLATION: ICD-10-CM

## 2024-04-23 DIAGNOSIS — E78.00 PURE HYPERCHOLESTEROLEMIA, UNSPECIFIED: ICD-10-CM

## 2024-04-23 DIAGNOSIS — E11.9 TYPE 2 DIABETES MELLITUS W/OUT COMPLICATIONS: ICD-10-CM

## 2024-04-23 DIAGNOSIS — I25.10 ATHEROSCLEROTIC HEART DISEASE OF NATIVE CORONARY ARTERY W/OUT ANGINA PECTORIS: ICD-10-CM

## 2024-04-23 PROCEDURE — 99214 OFFICE O/P EST MOD 30 MIN: CPT

## 2024-04-23 PROCEDURE — 93000 ELECTROCARDIOGRAM COMPLETE: CPT

## 2024-04-23 PROCEDURE — G2211 COMPLEX E/M VISIT ADD ON: CPT

## 2024-04-23 RX ORDER — TIRZEPATIDE 2.5 MG/.5ML
2.5 INJECTION, SOLUTION SUBCUTANEOUS
Qty: 1 | Refills: 0 | Status: ACTIVE | COMMUNITY
Start: 2024-04-23

## 2024-04-23 RX ORDER — ATORVASTATIN CALCIUM 40 MG/1
40 TABLET, FILM COATED ORAL
Qty: 90 | Refills: 3 | Status: ACTIVE | COMMUNITY
Start: 2017-12-20 | End: 1900-01-01

## 2024-04-23 NOTE — DISCUSSION/SUMMARY
[FreeTextEntry1] : The patient is a 73-year-old gentleman DM, HTN, HLD, nonobstructive CAD,PAF,  HFrEF with EF=39% whose edema improved but c/o leg increase girth. #1 CV- cardio-genetic results neg amyloid w/u, nonobstructive CAD #2 HFrEF- severe LVH with mod global LV dysfunction and EF=39% repeat 3/23 unclear,c/w furosemide 40mg daily and re-echo #3 PAF- in sinus, c/w eliquis, c/w toprol 50mg  #4 HTN- c/w metoprolol  #5 HLD- c/w atorvastatin and zetia #6 DM- on levemir and novolug, Mounjaro, labs reviewed,f/u Endo [EKG obtained to assist in diagnosis and management of assessed problem(s)] : EKG obtained to assist in diagnosis and management of assessed problem(s)

## 2024-04-23 NOTE — HISTORY OF PRESENT ILLNESS
[FreeTextEntry1] : Ananda is on Mounjaro and lost 25 pounds so far with alot less insulin and HbA1c down to 7.5 The lasix has made it much easier to climb stairs. If he misses it then fills up with fluids very easily.

## 2024-08-21 NOTE — ED PROVIDER NOTE - OBJECTIVE STATEMENT
Pt is 67Y M with hx DM, HLD, HTN distant hx of lymphoma he presents with acute onset L flank pain last night. He says that the pain is located on his left side and he cannot find a comfortable position. He denies hematuria at this time. He had some hematuria last month and saw his urologist that he sees for his BPH and was found to have a non-obstructing L 1.5cm kidney stone. He denies fever, vomiting, fatigue, cough, dysuria. He admits to some nausea. He has no weakness, numbness or tingling of the LEs. He does have a hx of smoking 20 years ago. He denies LOC, chest pain, SOB. Attending Attestation (For Attendings USE Only)...

## 2024-08-26 ENCOUNTER — NON-APPOINTMENT (OUTPATIENT)
Age: 74
End: 2024-08-26

## 2024-08-27 ENCOUNTER — RESULT REVIEW (OUTPATIENT)
Age: 74
End: 2024-08-27

## 2024-08-27 ENCOUNTER — OUTPATIENT (OUTPATIENT)
Dept: OUTPATIENT SERVICES | Facility: HOSPITAL | Age: 74
LOS: 1 days | End: 2024-08-27
Payer: MEDICARE

## 2024-08-27 ENCOUNTER — NON-APPOINTMENT (OUTPATIENT)
Age: 74
End: 2024-08-27

## 2024-08-27 ENCOUNTER — APPOINTMENT (OUTPATIENT)
Dept: CV DIAGNOSITCS | Facility: HOSPITAL | Age: 74
End: 2024-08-27

## 2024-08-27 ENCOUNTER — APPOINTMENT (OUTPATIENT)
Dept: CARDIOLOGY | Facility: CLINIC | Age: 74
End: 2024-08-27
Payer: MEDICARE

## 2024-08-27 VITALS
SYSTOLIC BLOOD PRESSURE: 134 MMHG | DIASTOLIC BLOOD PRESSURE: 85 MMHG | BODY MASS INDEX: 30.38 KG/M2 | HEART RATE: 75 BPM | WEIGHT: 217 LBS | HEIGHT: 71 IN | OXYGEN SATURATION: 98 %

## 2024-08-27 DIAGNOSIS — I48.91 UNSPECIFIED ATRIAL FIBRILLATION: ICD-10-CM

## 2024-08-27 DIAGNOSIS — I25.10 ATHEROSCLEROTIC HEART DISEASE OF NATIVE CORONARY ARTERY W/OUT ANGINA PECTORIS: ICD-10-CM

## 2024-08-27 DIAGNOSIS — E11.9 TYPE 2 DIABETES MELLITUS W/OUT COMPLICATIONS: ICD-10-CM

## 2024-08-27 DIAGNOSIS — I10 ESSENTIAL (PRIMARY) HYPERTENSION: ICD-10-CM

## 2024-08-27 DIAGNOSIS — I51.9 HEART DISEASE, UNSPECIFIED: ICD-10-CM

## 2024-08-27 DIAGNOSIS — E78.00 PURE HYPERCHOLESTEROLEMIA, UNSPECIFIED: ICD-10-CM

## 2024-08-27 PROCEDURE — 93356 MYOCRD STRAIN IMG SPCKL TRCK: CPT

## 2024-08-27 PROCEDURE — G2211 COMPLEX E/M VISIT ADD ON: CPT

## 2024-08-27 PROCEDURE — 99214 OFFICE O/P EST MOD 30 MIN: CPT

## 2024-08-27 PROCEDURE — 93306 TTE W/DOPPLER COMPLETE: CPT

## 2024-08-27 PROCEDURE — 76376 3D RENDER W/INTRP POSTPROCES: CPT | Mod: 26

## 2024-08-27 PROCEDURE — 93000 ELECTROCARDIOGRAM COMPLETE: CPT

## 2024-08-27 PROCEDURE — 76376 3D RENDER W/INTRP POSTPROCES: CPT

## 2024-08-27 PROCEDURE — 93306 TTE W/DOPPLER COMPLETE: CPT | Mod: 26

## 2024-08-27 NOTE — DISCUSSION/SUMMARY
Infant discharged home at 1330 with parents. Discharge education completed and AVS printed and signed by mother. Parents verbalized and demonstrated understanding and had no further questions or concerns. Car seat check completed and baby bands verified by discharge RN. Infant taken in car seat by father to front lobby accompanied by hospital staff.    [FreeTextEntry1] : The patient is a 74-year-old gentleman DM, HTN, HLD, nonobstructive CAD,PAF,  HFrEF with EF=39% who is only taking Eliquis once a day.  #1 CV- cardio-genetic results neg amyloid w/u, nonobstructive CAD #2 HFrEF- severe LVH with mod global LV dysfunction and EF=39% c/w furosemide 40mg daily and re-echo today #3 PAF- in sinus, c/w eliquis must be twice a day, c/w toprol 50mg  #4 HTN- c/w metoprolol  #5 HLD- c/w atorvastatin and zetia #6 DM- on levemir and novolug, Mounjaro, labs reviewed,f/u Endo, continue daily exercise and walking.  [EKG obtained to assist in diagnosis and management of assessed problem(s)] : EKG obtained to assist in diagnosis and management of assessed problem(s)

## 2024-08-27 NOTE — DISCUSSION/SUMMARY
[FreeTextEntry1] : The patient is a 74-year-old gentleman DM, HTN, HLD, nonobstructive CAD,PAF,  HFrEF with EF=39% who is only taking Eliquis once a day.  #1 CV- cardio-genetic results neg amyloid w/u, nonobstructive CAD #2 HFrEF- severe LVH with mod global LV dysfunction and EF=39% c/w furosemide 40mg daily and re-echo today #3 PAF- in sinus, c/w eliquis must be twice a day, c/w toprol 50mg  #4 HTN- c/w metoprolol  #5 HLD- c/w atorvastatin and zetia #6 DM- on levemir and novolug, Mounjaro, labs reviewed,f/u Endo, continue daily exercise and walking.  [EKG obtained to assist in diagnosis and management of assessed problem(s)] : EKG obtained to assist in diagnosis and management of assessed problem(s)

## 2024-08-27 NOTE — HISTORY OF PRESENT ILLNESS
[FreeTextEntry1] : Ananda is walking more and feels stronger. Losing slowly on Mounjaro. Sometimes glucose low and feels it so self adjusts it. No CP, palpitations, lightheadedness, dizziness, or SOB>

## 2024-08-28 DIAGNOSIS — R93.1 ABNORMAL FINDINGS ON DIAGNOSTIC IMAGING OF HEART AND CORONARY CIRCULATION: ICD-10-CM

## 2024-09-23 ENCOUNTER — RX RENEWAL (OUTPATIENT)
Age: 74
End: 2024-09-23

## 2024-12-10 ENCOUNTER — NON-APPOINTMENT (OUTPATIENT)
Age: 74
End: 2024-12-10

## 2024-12-10 ENCOUNTER — APPOINTMENT (OUTPATIENT)
Dept: CARDIOLOGY | Facility: CLINIC | Age: 74
End: 2024-12-10
Payer: MEDICARE

## 2024-12-10 VITALS
WEIGHT: 218 LBS | SYSTOLIC BLOOD PRESSURE: 136 MMHG | BODY MASS INDEX: 30.41 KG/M2 | DIASTOLIC BLOOD PRESSURE: 86 MMHG | HEART RATE: 80 BPM

## 2024-12-10 DIAGNOSIS — I48.91 UNSPECIFIED ATRIAL FIBRILLATION: ICD-10-CM

## 2024-12-10 DIAGNOSIS — I10 ESSENTIAL (PRIMARY) HYPERTENSION: ICD-10-CM

## 2024-12-10 DIAGNOSIS — E78.00 PURE HYPERCHOLESTEROLEMIA, UNSPECIFIED: ICD-10-CM

## 2024-12-10 PROCEDURE — 93000 ELECTROCARDIOGRAM COMPLETE: CPT

## 2024-12-10 PROCEDURE — G2211 COMPLEX E/M VISIT ADD ON: CPT

## 2024-12-10 PROCEDURE — 99214 OFFICE O/P EST MOD 30 MIN: CPT

## 2024-12-11 ENCOUNTER — NON-APPOINTMENT (OUTPATIENT)
Age: 74
End: 2024-12-11

## 2025-02-12 NOTE — PROGRESS NOTE ADULT - SUBJECTIVE AND OBJECTIVE BOX
[facility] DIVISION OF KIDNEY DISEASES AND HYPERTENSION   FOLLOW UP NOTE    --------------------------------------------------------------------------------  Chief Complaint:    24 hour events/subjective: Pt. was seen and examined today. No acute overnight events noted. Pt feeling well without complaints. Denies orthopnea, PND, SOB, GUILLEN. Notes stable LE edema improved from prior but persisting. Denies recurrent sz like episodes and resolved foot pain from initial episode. Denies myalgia      PAST HISTORY  --------------------------------------------------------------------------------  No significant changes to PMH, PSH, FHx, SHx, unless otherwise noted    ALLERGIES & MEDICATIONS  --------------------------------------------------------------------------------  Allergies    No Known Allergies    Intolerances      Standing Inpatient Medications  aspirin  chewable 81 milliGRAM(s) Oral daily  atorvastatin 80 milliGRAM(s) Oral at bedtime  dextrose 5%. 1000 milliLiter(s) IV Continuous <Continuous>  dextrose 5%. 1000 milliLiter(s) IV Continuous <Continuous>  dextrose 50% Injectable 25 Gram(s) IV Push once  dextrose 50% Injectable 12.5 Gram(s) IV Push once  dextrose 50% Injectable 25 Gram(s) IV Push once  glucagon  Injectable 1 milliGRAM(s) IntraMuscular once  heparin  Infusion.  Unit(s)/Hr IV Continuous <Continuous>  insulin glargine Injectable (LANTUS) 17 Unit(s) SubCutaneous at bedtime  insulin lispro (ADMELOG) corrective regimen sliding scale   SubCutaneous three times a day before meals  insulin lispro Injectable (ADMELOG) 6 Unit(s) SubCutaneous three times a day before meals  metoprolol succinate  milliGRAM(s) Oral daily    PRN Inpatient Medications  dextrose Oral Gel 15 Gram(s) Oral once PRN      REVIEW OF SYSTEMS  --------------------------------------------------------------------------------  Gen: No fevers/chills  Head/Eyes/Ears: No HA   Respiratory: No dyspnea, cough  CV: No chest pain  GI: No abdominal pain, diarrhea  : No dysuria, hematuria  MSK: Stable edema  Skin: No rashes  Heme: No easy bruising or bleeding    All other systems were reviewed and are negative, except as noted.    VITALS/PHYSICAL EXAM  --------------------------------------------------------------------------------  T(C): 36.5 (12-07-22 @ 04:47), Max: 36.6 (12-06-22 @ 20:48)  HR: 71 (12-07-22 @ 04:47) (71 - 102)  BP: 148/87 (12-07-22 @ 04:47) (119/68 - 148/87)  RR: 18 (12-07-22 @ 04:47) (18 - 18)  SpO2: 97% (12-07-22 @ 04:47) (97% - 98%)  Wt(kg): --        12-06-22 @ 07:01  -  12-07-22 @ 07:00  --------------------------------------------------------  IN: 960 mL / OUT: 1500 mL / NET: -540 mL    12-07-22 @ 07:01  -  12-07-22 @ 09:36  --------------------------------------------------------  IN: 360 mL / OUT: 0 mL / NET: 360 mL        Physical Exam:  	Gen: NAD  	HEENT: Anicteric  	Pulm: CTA B/L  	CV: S1S2+  	Ext:  LE edema B/L 2+ to knees  	Neuro: Awake  	Skin: Warm and dry    LABS/STUDIES  --------------------------------------------------------------------------------              11.6   4.00  >-----------<  207      [12-07-22 @ 05:43]              37.3     141  |  108  |  33  ----------------------------<  123      [12-07-22 @ 05:45]  4.5   |  23  |  1.97        Ca     9.4     [12-07-22 @ 05:45]      Mg     2.1     [12-07-22 @ 05:45]      Phos  3.8     [12-07-22 @ 05:45]    TPro  6.4  /  Alb  3.7  /  TBili  0.2  /  DBili  x   /  AST  25  /  ALT  35  /  AlkPhos  39  [12-07-22 @ 05:45]    PT/INR: PT 13.2 , INR 1.15       [12-07-22 @ 05:44]  PTT: 95.2       [12-07-22 @ 05:44]          [12-06-22 @ 06:12]    Creatinine Trend:  SCr 1.97 [12-07 @ 05:45]  SCr 2.13 [12-06 @ 06:12]  SCr 2.06 [12-05 @ 04:34]  SCr 2.07 [12-04 @ 05:50]  SCr 2.09 [12-03 @ 07:07]    Urinalysis - [12-05-22 @ 16:18]      Color Yellow / Appearance Clear / SG 1.021 / pH 6.0      Gluc Negative / Ketone Negative  / Bili Negative / Urobili Negative       Blood Large / Protein 100 / Leuk Est Negative / Nitrite Negative       / WBC 1 / Hyaline 1 / Gran  / Sq Epi  / Non Sq Epi 0 / Bacteria Negative    Urine Creatinine 178      [12-05-22 @ 16:33]  Urine Protein 69      [12-05-22 @ 16:33]  Urine Urea Nitrogen 1112      [12-05-22 @ 16:32]  Urine Osmolality 633      [12-05-22 @ 16:33]    HCV 0.06, Nonreact      [12-02-22 @ 05:42]    Free Light Chains: kappa 4.11, lambda 2.75, ratio = 1.49      [12-04 @ 05:51]   Statement Selected

## 2025-04-08 ENCOUNTER — NON-APPOINTMENT (OUTPATIENT)
Age: 75
End: 2025-04-08

## 2025-04-08 ENCOUNTER — APPOINTMENT (OUTPATIENT)
Dept: CARDIOLOGY | Facility: CLINIC | Age: 75
End: 2025-04-08
Payer: MEDICARE

## 2025-04-08 VITALS
OXYGEN SATURATION: 94 % | WEIGHT: 217 LBS | BODY MASS INDEX: 30.27 KG/M2 | DIASTOLIC BLOOD PRESSURE: 94 MMHG | SYSTOLIC BLOOD PRESSURE: 149 MMHG | HEART RATE: 80 BPM

## 2025-04-08 DIAGNOSIS — I48.91 UNSPECIFIED ATRIAL FIBRILLATION: ICD-10-CM

## 2025-04-08 DIAGNOSIS — I25.10 ATHEROSCLEROTIC HEART DISEASE OF NATIVE CORONARY ARTERY W/OUT ANGINA PECTORIS: ICD-10-CM

## 2025-04-08 DIAGNOSIS — E11.9 TYPE 2 DIABETES MELLITUS W/OUT COMPLICATIONS: ICD-10-CM

## 2025-04-08 DIAGNOSIS — I10 ESSENTIAL (PRIMARY) HYPERTENSION: ICD-10-CM

## 2025-04-08 DIAGNOSIS — E78.00 PURE HYPERCHOLESTEROLEMIA, UNSPECIFIED: ICD-10-CM

## 2025-04-08 PROCEDURE — G2211 COMPLEX E/M VISIT ADD ON: CPT

## 2025-04-08 PROCEDURE — 99214 OFFICE O/P EST MOD 30 MIN: CPT

## 2025-04-08 PROCEDURE — 93000 ELECTROCARDIOGRAM COMPLETE: CPT

## 2025-06-18 ENCOUNTER — RX RENEWAL (OUTPATIENT)
Age: 75
End: 2025-06-18

## 2025-07-08 ENCOUNTER — APPOINTMENT (OUTPATIENT)
Dept: CARDIOLOGY | Facility: CLINIC | Age: 75
End: 2025-07-08
Payer: MEDICARE

## 2025-07-08 VITALS
SYSTOLIC BLOOD PRESSURE: 143 MMHG | HEART RATE: 84 BPM | DIASTOLIC BLOOD PRESSURE: 93 MMHG | OXYGEN SATURATION: 95 % | HEIGHT: 71 IN | WEIGHT: 218 LBS | BODY MASS INDEX: 30.52 KG/M2

## 2025-07-08 PROBLEM — I51.89 LEFT VENTRICULAR SYSTOLIC DYSFUNCTION: Status: ACTIVE | Noted: 2022-12-29

## 2025-07-08 PROCEDURE — G2211 COMPLEX E/M VISIT ADD ON: CPT

## 2025-07-08 PROCEDURE — 93000 ELECTROCARDIOGRAM COMPLETE: CPT

## 2025-07-08 PROCEDURE — 99214 OFFICE O/P EST MOD 30 MIN: CPT

## 2025-07-08 RX ORDER — AMLODIPINE BESYLATE 2.5 MG/1
2.5 TABLET ORAL DAILY
Qty: 1 | Refills: 3 | Status: ACTIVE | COMMUNITY
Start: 2025-07-08 | End: 1900-01-01